# Patient Record
Sex: MALE | Race: WHITE | NOT HISPANIC OR LATINO | URBAN - METROPOLITAN AREA
[De-identification: names, ages, dates, MRNs, and addresses within clinical notes are randomized per-mention and may not be internally consistent; named-entity substitution may affect disease eponyms.]

---

## 2018-05-10 ENCOUNTER — EMERGENCY (EMERGENCY)
Facility: HOSPITAL | Age: 83
LOS: 1 days | Discharge: HOME | End: 2018-05-10
Attending: EMERGENCY MEDICINE | Admitting: EMERGENCY MEDICINE
Payer: MEDICARE

## 2018-05-10 VITALS
OXYGEN SATURATION: 99 % | SYSTOLIC BLOOD PRESSURE: 173 MMHG | DIASTOLIC BLOOD PRESSURE: 79 MMHG | HEART RATE: 65 BPM | RESPIRATION RATE: 15 BRPM

## 2018-05-10 VITALS
DIASTOLIC BLOOD PRESSURE: 90 MMHG | SYSTOLIC BLOOD PRESSURE: 173 MMHG | OXYGEN SATURATION: 100 % | HEART RATE: 68 BPM | TEMPERATURE: 98 F | RESPIRATION RATE: 15 BRPM

## 2018-05-10 LAB
ALBUMIN SERPL ELPH-MCNC: 3.6 G/DL — SIGNIFICANT CHANGE UP (ref 3.3–5)
ALP SERPL-CCNC: 73 U/L — SIGNIFICANT CHANGE UP (ref 40–120)
ALT FLD-CCNC: 21 U/L — SIGNIFICANT CHANGE UP (ref 12–78)
ANION GAP SERPL CALC-SCNC: 10 MMOL/L — SIGNIFICANT CHANGE UP (ref 5–17)
AST SERPL-CCNC: 22 U/L — SIGNIFICANT CHANGE UP (ref 15–37)
BASOPHILS # BLD AUTO: 0.1 K/UL — SIGNIFICANT CHANGE UP (ref 0–0.2)
BASOPHILS NFR BLD AUTO: 0.8 % — SIGNIFICANT CHANGE UP (ref 0–2)
BILIRUB SERPL-MCNC: 0.3 MG/DL — SIGNIFICANT CHANGE UP (ref 0.2–1.2)
BUN SERPL-MCNC: 19 MG/DL — SIGNIFICANT CHANGE UP (ref 7–23)
CALCIUM SERPL-MCNC: 8.5 MG/DL — SIGNIFICANT CHANGE UP (ref 8.5–10.1)
CHLORIDE SERPL-SCNC: 106 MMOL/L — SIGNIFICANT CHANGE UP (ref 96–108)
CK MB BLD-MCNC: 3.5 % — SIGNIFICANT CHANGE UP (ref 0–3.5)
CK MB CFR SERPL CALC: 2.1 NG/ML — SIGNIFICANT CHANGE UP (ref 0–3.6)
CK SERPL-CCNC: 60 U/L — SIGNIFICANT CHANGE UP (ref 26–308)
CO2 SERPL-SCNC: 24 MMOL/L — SIGNIFICANT CHANGE UP (ref 22–31)
CREAT SERPL-MCNC: 0.91 MG/DL — SIGNIFICANT CHANGE UP (ref 0.5–1.3)
EOSINOPHIL # BLD AUTO: 0.1 K/UL — SIGNIFICANT CHANGE UP (ref 0–0.5)
EOSINOPHIL NFR BLD AUTO: 1.4 % — SIGNIFICANT CHANGE UP (ref 0–6)
GLUCOSE SERPL-MCNC: 114 MG/DL — HIGH (ref 70–99)
HCT VFR BLD CALC: 38.8 % — LOW (ref 39–50)
HGB BLD-MCNC: 13 G/DL — SIGNIFICANT CHANGE UP (ref 13–17)
LYMPHOCYTES # BLD AUTO: 1.1 K/UL — SIGNIFICANT CHANGE UP (ref 1–3.3)
LYMPHOCYTES # BLD AUTO: 13.9 % — SIGNIFICANT CHANGE UP (ref 13–44)
MCHC RBC-ENTMCNC: 30.6 PG — SIGNIFICANT CHANGE UP (ref 27–34)
MCHC RBC-ENTMCNC: 33.6 GM/DL — SIGNIFICANT CHANGE UP (ref 32–36)
MCV RBC AUTO: 91.1 FL — SIGNIFICANT CHANGE UP (ref 80–100)
MONOCYTES # BLD AUTO: 0.6 K/UL — SIGNIFICANT CHANGE UP (ref 0–0.9)
MONOCYTES NFR BLD AUTO: 6.7 % — SIGNIFICANT CHANGE UP (ref 1–9)
NEUTROPHILS # BLD AUTO: 6.4 K/UL — SIGNIFICANT CHANGE UP (ref 1.8–7.4)
NEUTROPHILS NFR BLD AUTO: 77.2 % — HIGH (ref 43–77)
PLATELET # BLD AUTO: 127 K/UL — LOW (ref 150–400)
POTASSIUM SERPL-MCNC: 3.9 MMOL/L — SIGNIFICANT CHANGE UP (ref 3.5–5.3)
POTASSIUM SERPL-SCNC: 3.9 MMOL/L — SIGNIFICANT CHANGE UP (ref 3.5–5.3)
PROT SERPL-MCNC: 6.6 G/DL — SIGNIFICANT CHANGE UP (ref 6–8.3)
RBC # BLD: 4.26 M/UL — SIGNIFICANT CHANGE UP (ref 4.2–5.8)
RBC # FLD: 13.1 % — SIGNIFICANT CHANGE UP (ref 10.3–14.5)
SODIUM SERPL-SCNC: 140 MMOL/L — SIGNIFICANT CHANGE UP (ref 135–145)
TROPONIN I SERPL-MCNC: <.015 NG/ML — SIGNIFICANT CHANGE UP (ref 0.01–0.04)
WBC # BLD: 8.2 K/UL — SIGNIFICANT CHANGE UP (ref 3.8–10.5)
WBC # FLD AUTO: 8.2 K/UL — SIGNIFICANT CHANGE UP (ref 3.8–10.5)

## 2018-05-10 PROCEDURE — 82553 CREATINE MB FRACTION: CPT

## 2018-05-10 PROCEDURE — 93005 ELECTROCARDIOGRAM TRACING: CPT

## 2018-05-10 PROCEDURE — 99284 EMERGENCY DEPT VISIT MOD MDM: CPT | Mod: 25

## 2018-05-10 PROCEDURE — 99285 EMERGENCY DEPT VISIT HI MDM: CPT

## 2018-05-10 PROCEDURE — 84484 ASSAY OF TROPONIN QUANT: CPT

## 2018-05-10 PROCEDURE — 82550 ASSAY OF CK (CPK): CPT

## 2018-05-10 PROCEDURE — 80053 COMPREHEN METABOLIC PANEL: CPT

## 2018-05-10 PROCEDURE — 85027 COMPLETE CBC AUTOMATED: CPT

## 2018-05-10 PROCEDURE — 71045 X-RAY EXAM CHEST 1 VIEW: CPT

## 2018-05-10 PROCEDURE — 71045 X-RAY EXAM CHEST 1 VIEW: CPT | Mod: 26

## 2018-05-10 RX ORDER — ATENOLOL 25 MG/1
25 TABLET ORAL ONCE
Qty: 0 | Refills: 0 | Status: COMPLETED | OUTPATIENT
Start: 2018-05-10 | End: 2018-05-10

## 2018-05-10 RX ORDER — HYDRALAZINE HCL 50 MG
10 TABLET ORAL ONCE
Qty: 0 | Refills: 0 | Status: DISCONTINUED | OUTPATIENT
Start: 2018-05-10 | End: 2018-05-14

## 2018-05-10 RX ORDER — AMLODIPINE BESYLATE 2.5 MG/1
2.5 TABLET ORAL ONCE
Qty: 0 | Refills: 0 | Status: COMPLETED | OUTPATIENT
Start: 2018-05-10 | End: 2018-05-10

## 2018-05-10 RX ORDER — SODIUM CHLORIDE 9 MG/ML
3 INJECTION INTRAMUSCULAR; INTRAVENOUS; SUBCUTANEOUS ONCE
Qty: 0 | Refills: 0 | Status: COMPLETED | OUTPATIENT
Start: 2018-05-10 | End: 2018-05-10

## 2018-05-10 RX ADMIN — AMLODIPINE BESYLATE 2.5 MILLIGRAM(S): 2.5 TABLET ORAL at 20:48

## 2018-05-10 RX ADMIN — ATENOLOL 25 MILLIGRAM(S): 25 TABLET ORAL at 20:48

## 2018-05-10 RX ADMIN — SODIUM CHLORIDE 3 MILLILITER(S): 9 INJECTION INTRAMUSCULAR; INTRAVENOUS; SUBCUTANEOUS at 20:39

## 2018-05-10 NOTE — ED ADULT NURSE REASSESSMENT NOTE - NS ED NURSE REASSESS COMMENT FT1
son at bedside, cardiologist at bedside evaluating patient, aware of elevated b/p. Patient states that he has not been taking all of his medications for blood pressure over past few days since he has been forgetting at times. Denies chest pain, sob, headache. Awaiting results and dispo at this time.

## 2018-05-10 NOTE — ED PROVIDER NOTE - MEDICAL DECISION MAKING DETAILS
pt with presyncopal dizziness after getting up quick, not resolved, took xanax,  pt with continued dx. per friends pt wife  . pt anxious. check ekg, labs x 1. cards consult

## 2018-05-10 NOTE — ED PROVIDER NOTE - CARE PLAN
Principal Discharge DX:	Dizziness Principal Discharge DX:	Dizziness  Secondary Diagnosis:	Hypertension, unspecified type

## 2018-05-10 NOTE — ED ADULT NURSE REASSESSMENT NOTE - NS ED NURSE REASSESS COMMENT FT1
patient d/c to home asymptomatic, with son present, will call his PMD in AM. Patient eating dinner and tolerating well.

## 2018-05-10 NOTE — ED ADULT NURSE NOTE - OBJECTIVE STATEMENT
pt states he woke out of a sound sleep to answer the front door. he state he thought he heard knocking and the front door bell ringing. when pt got to door he became lightheaded and sat down, then he crawled to phone and called EMS. pt still with lightheadedness, denies nausea or vomiting.

## 2018-05-10 NOTE — CONSULT NOTE ADULT - SUBJECTIVE AND OBJECTIVE BOX
History of Present Illness: The patient is a 89 year old male with a history of HTN, HL, CAD s/p PCI, BPH who presents with pre-syncope. He states he was feeling tired earlier, heard a knock at the door, stood up and walked to door. When he got there, he felt very lightheaded as if he was going to lose consciousness. EMS came and his BPs were noted to be elevated. He denies palpitations, chest pain, shortness of breath. He did feel nauseous en route to ED and vomited once. He had similar symptoms in the past when his BP was elevated.    Past Medical/Surgical History:  HTN, HL, CAD s/p PCI, BPH    Medications:  Home Medications:  ALPRAZolam:  (10 May 2018 18:12)  aspirin:  (10 May 2018 18:12)  atenolol 25 mg daily:  (10 May 2018 18:12)  Zocor:  (10 May 2018 18:12)  amlodipine 2.5 mg daily    Family History: Non-contributory family history of premature cardiovascular atherosclerotic disease    Social History: No tobacco, alcohol or drug use    Review of Systems:  General: No fevers, chills, weight loss or gain  Skin: No rashes, color changes  Cardiovascular: No chest pain, orthopnea  Respiratory: No shortness of breath, cough  Gastrointestinal: No nausea, abdominal pain  Genitourinary: No incontinence, pain with urination  Musculoskeletal: No pain, swelling, decreased range of motion  Neurological: No headache, weakness  Psychiatric: No depression, anxiety  Endocrine: No weight loss or gain, increased thirst  All other systems are comprehensively negative.    Physical Exam:  Vitals:        Vital Signs Last 24 Hrs  T(C): 36.5 (10 May 2018 18:07), Max: 36.5 (10 May 2018 18:07)  T(F): 97.7 (10 May 2018 18:07), Max: 97.7 (10 May 2018 18:07)  HR: 65 (10 May 2018 19:45) (60 - 68)  BP: 213/93 (10 May 2018 19:45) (172/78 - 213/93)  BP(mean): --  RR: 16 (10 May 2018 19:45) (15 - 16)  SpO2: 100% (10 May 2018 19:45) (100% - 100%)  General: NAD  HEENT: MMM  Neck: No JVD, no carotid bruit  Lungs: CTAB  CV: RRR, nl S1/S2, no M/R/G  Abdomen: S/NT/ND, +BS  Extremities: No LE edema, no cyanosis  Neuro: AAOx3, non-focal  Skin: No rash    Labs:                        13.0   8.2   )-----------( 127      ( 10 May 2018 19:44 )             38.8                   ECG: NSR, normal axis, no ST abnormality

## 2018-05-10 NOTE — ED PROVIDER NOTE - CHPI ED SYMPTOMS NEG
no fever/no back pain/no nausea/no syncope/no shortness of breath/no chest pain/no cough/no chills/no diaphoresis/no vomiting

## 2018-05-10 NOTE — ED PROVIDER NOTE - OBJECTIVE STATEMENT
Pt is a 89 yo male hx htn. pt today napping and heard door and got up quick to answer door and since with presyncopal dizziness. pt on proscar and occasionally gets dizzy on getting up but today sx continued. pt took .25mg of xanax but still with dizziness. no vertigo, cp, sob, black stool, bleeding, n/v/d. pt feels very anxious. per friends, pt wife  on .

## 2018-05-10 NOTE — CONSULT NOTE ADULT - ASSESSMENT
The patient is a 89 year old male with a history of HTN, HL, CAD s/p PCI, BPH who presents with pre-syncope in the setting of hypertensive urgency.    Plan:  - ECG with no predisposing features for pre-syncope / syncope  - CXR with no active disease  - Basic lab work including one set of cardiac enzymes negative  - Give hydralazine 10 mg IV now  - Restart amlodipine and atenolol  - If above work-up negative, BP improved, and patient's symptoms improved, patient can be discharged from a cardiac perspective and follow-up with his cardiologist next week for BP check, and further work-up if indicated

## 2019-03-05 ENCOUNTER — EMERGENCY (EMERGENCY)
Facility: HOSPITAL | Age: 84
LOS: 1 days | Discharge: HOME | End: 2019-03-05
Attending: EMERGENCY MEDICINE | Admitting: EMERGENCY MEDICINE
Payer: MEDICARE

## 2019-03-05 VITALS — HEIGHT: 67 IN | WEIGHT: 149.91 LBS

## 2019-03-05 VITALS
DIASTOLIC BLOOD PRESSURE: 80 MMHG | TEMPERATURE: 97 F | RESPIRATION RATE: 18 BRPM | SYSTOLIC BLOOD PRESSURE: 162 MMHG | HEART RATE: 74 BPM | OXYGEN SATURATION: 95 %

## 2019-03-05 LAB
ALBUMIN SERPL ELPH-MCNC: 3.6 G/DL — SIGNIFICANT CHANGE UP (ref 3.3–5)
ALP SERPL-CCNC: 90 U/L — SIGNIFICANT CHANGE UP (ref 40–120)
ALT FLD-CCNC: 31 U/L — SIGNIFICANT CHANGE UP (ref 12–78)
ANION GAP SERPL CALC-SCNC: 9 MMOL/L — SIGNIFICANT CHANGE UP (ref 5–17)
APTT BLD: 33 SEC — SIGNIFICANT CHANGE UP (ref 28.5–37)
AST SERPL-CCNC: 22 U/L — SIGNIFICANT CHANGE UP (ref 15–37)
BILIRUB SERPL-MCNC: 0.5 MG/DL — SIGNIFICANT CHANGE UP (ref 0.2–1.2)
BUN SERPL-MCNC: 14 MG/DL — SIGNIFICANT CHANGE UP (ref 7–23)
CALCIUM SERPL-MCNC: 8.3 MG/DL — LOW (ref 8.5–10.1)
CHLORIDE SERPL-SCNC: 96 MMOL/L — SIGNIFICANT CHANGE UP (ref 96–108)
CO2 SERPL-SCNC: 27 MMOL/L — SIGNIFICANT CHANGE UP (ref 22–31)
CREAT SERPL-MCNC: 1 MG/DL — SIGNIFICANT CHANGE UP (ref 0.5–1.3)
GLUCOSE SERPL-MCNC: 109 MG/DL — HIGH (ref 70–99)
HCT VFR BLD CALC: 38.5 % — LOW (ref 39–50)
HGB BLD-MCNC: 12.6 G/DL — LOW (ref 13–17)
INR BLD: 1.32 RATIO — HIGH (ref 0.88–1.16)
MCHC RBC-ENTMCNC: 29.4 PG — SIGNIFICANT CHANGE UP (ref 27–34)
MCHC RBC-ENTMCNC: 32.7 GM/DL — SIGNIFICANT CHANGE UP (ref 32–36)
MCV RBC AUTO: 90 FL — SIGNIFICANT CHANGE UP (ref 80–100)
NRBC # BLD: 0 /100 WBCS — SIGNIFICANT CHANGE UP (ref 0–0)
PLATELET # BLD AUTO: 160 K/UL — SIGNIFICANT CHANGE UP (ref 150–400)
POTASSIUM SERPL-MCNC: 3.7 MMOL/L — SIGNIFICANT CHANGE UP (ref 3.5–5.3)
POTASSIUM SERPL-SCNC: 3.7 MMOL/L — SIGNIFICANT CHANGE UP (ref 3.5–5.3)
PROT SERPL-MCNC: 7.3 G/DL — SIGNIFICANT CHANGE UP (ref 6–8.3)
PROTHROM AB SERPL-ACNC: 15.2 SEC — HIGH (ref 10–12.9)
RBC # BLD: 4.28 M/UL — SIGNIFICANT CHANGE UP (ref 4.2–5.8)
RBC # FLD: 13.2 % — SIGNIFICANT CHANGE UP (ref 10.3–14.5)
SODIUM SERPL-SCNC: 132 MMOL/L — LOW (ref 135–145)
WBC # BLD: 8.29 K/UL — SIGNIFICANT CHANGE UP (ref 3.8–10.5)
WBC # FLD AUTO: 8.29 K/UL — SIGNIFICANT CHANGE UP (ref 3.8–10.5)

## 2019-03-05 PROCEDURE — 85730 THROMBOPLASTIN TIME PARTIAL: CPT

## 2019-03-05 PROCEDURE — 93005 ELECTROCARDIOGRAM TRACING: CPT

## 2019-03-05 PROCEDURE — 99284 EMERGENCY DEPT VISIT MOD MDM: CPT | Mod: 25

## 2019-03-05 PROCEDURE — 80053 COMPREHEN METABOLIC PANEL: CPT

## 2019-03-05 PROCEDURE — 96374 THER/PROPH/DIAG INJ IV PUSH: CPT

## 2019-03-05 PROCEDURE — 85027 COMPLETE CBC AUTOMATED: CPT

## 2019-03-05 PROCEDURE — 93010 ELECTROCARDIOGRAM REPORT: CPT

## 2019-03-05 PROCEDURE — 99284 EMERGENCY DEPT VISIT MOD MDM: CPT

## 2019-03-05 PROCEDURE — 85610 PROTHROMBIN TIME: CPT

## 2019-03-05 PROCEDURE — 36415 COLL VENOUS BLD VENIPUNCTURE: CPT

## 2019-03-05 RX ORDER — SODIUM CHLORIDE 9 MG/ML
1000 INJECTION INTRAMUSCULAR; INTRAVENOUS; SUBCUTANEOUS ONCE
Qty: 0 | Refills: 0 | Status: COMPLETED | OUTPATIENT
Start: 2019-03-05 | End: 2019-03-05

## 2019-03-05 RX ORDER — KETOROLAC TROMETHAMINE 30 MG/ML
30 SYRINGE (ML) INJECTION ONCE
Qty: 0 | Refills: 0 | Status: DISCONTINUED | OUTPATIENT
Start: 2019-03-05 | End: 2019-03-05

## 2019-03-05 RX ADMIN — SODIUM CHLORIDE 1000 MILLILITER(S): 9 INJECTION INTRAMUSCULAR; INTRAVENOUS; SUBCUTANEOUS at 20:42

## 2019-03-05 RX ADMIN — Medication 30 MILLIGRAM(S): at 20:41

## 2019-03-05 NOTE — ED ADULT NURSE REASSESSMENT NOTE - NS ED NURSE REASSESS COMMENT FT1
Report taken from Brenda GRANADO. Pt received alert and oriented x 4 c/o 3/10 dull headache and weakness. 18 G IV noted to right forearm, and 20g iv noted to right ac. No acute distress. Lab results and ED orders pending.

## 2019-03-05 NOTE — ED PROVIDER NOTE - OBJECTIVE STATEMENT
89 yo white male with H/O HTN and HLD with recent travel to [place] who over the past week or so has been fatigued and nasally congested. Over the past day has been weaker and then today had diarrhea, frequent and non bloody without chest pain, abdominal pains, dysuria, frequency, hematuria or cough/SOB. Mild headache today w/o visual changes or neck pain. No AbXs recently and no ill contacts.

## 2019-03-05 NOTE — ED PROVIDER NOTE - CARE PROVIDER_API CALL
Hima Morrow (DO)  Gastroenterology; Internal Medicine  [address] [place] [id number]  Phone: [phone]  Fax: [phone]  Follow Up Time:

## 2019-03-05 NOTE — ED PROVIDER NOTE - NSFOLLOWUPINSTRUCTIONS_ED_ALL_ED_FT
[place] diet for the next few days  Increase fluid intake  Follow-up with your doctor this week  Return here if needed

## 2019-03-06 PROBLEM — I10 ESSENTIAL (PRIMARY) HYPERTENSION: Chronic | Status: ACTIVE | Noted: 2018-05-10

## 2019-07-11 PROBLEM — E78.00 PURE HYPERCHOLESTEROLEMIA, UNSPECIFIED: Chronic | Status: ACTIVE | Noted: 2019-03-05

## 2019-07-22 ENCOUNTER — APPOINTMENT (OUTPATIENT)
Dept: OTOLARYNGOLOGY | Facility: CLINIC | Age: 84
End: 2019-07-22
Payer: MEDICARE

## 2019-07-22 VITALS
HEART RATE: 67 BPM | BODY MASS INDEX: 23.54 KG/M2 | OXYGEN SATURATION: 97 % | HEIGHT: 67 IN | WEIGHT: 150 LBS | DIASTOLIC BLOOD PRESSURE: 80 MMHG | SYSTOLIC BLOOD PRESSURE: 170 MMHG

## 2019-07-22 DIAGNOSIS — Z82.2 FAMILY HISTORY OF DEAFNESS AND HEARING LOSS: ICD-10-CM

## 2019-07-22 DIAGNOSIS — Z84.89 FAMILY HISTORY OF OTHER SPECIFIED CONDITIONS: ICD-10-CM

## 2019-07-22 DIAGNOSIS — Z86.79 PERSONAL HISTORY OF OTHER DISEASES OF THE CIRCULATORY SYSTEM: ICD-10-CM

## 2019-07-22 PROCEDURE — 99203 OFFICE O/P NEW LOW 30 MIN: CPT | Mod: 25

## 2019-07-22 PROCEDURE — 31575 DIAGNOSTIC LARYNGOSCOPY: CPT

## 2019-07-22 RX ORDER — PANTOPRAZOLE 40 MG/1
40 TABLET, DELAYED RELEASE ORAL
Refills: 0 | Status: ACTIVE | COMMUNITY

## 2019-07-22 RX ORDER — MIRTAZAPINE 15 MG/1
15 TABLET, FILM COATED ORAL
Refills: 0 | Status: ACTIVE | COMMUNITY

## 2019-07-22 RX ORDER — ALLOPURINOL 300 MG/1
300 TABLET ORAL
Refills: 0 | Status: ACTIVE | COMMUNITY

## 2019-07-22 RX ORDER — SIMVASTATIN 5 MG/1
5 TABLET, FILM COATED ORAL
Refills: 0 | Status: ACTIVE | COMMUNITY

## 2019-07-22 RX ORDER — UBIDECARENONE 200 MG
200 CAPSULE ORAL
Refills: 0 | Status: ACTIVE | COMMUNITY

## 2019-07-22 RX ORDER — ALPRAZOLAM 0.25 MG/1
0.25 TABLET ORAL
Refills: 0 | Status: ACTIVE | COMMUNITY

## 2019-07-22 RX ORDER — ATENOLOL 25 MG/1
25 TABLET ORAL
Refills: 0 | Status: ACTIVE | COMMUNITY

## 2019-07-22 NOTE — PROCEDURE
[Unable to Cooperate with Mirror] : patient unable to cooperate with mirror [Hoarseness] : hoarseness not clearly evaluated by indirect laryngoscopy [Topical Lidocaine] : topical lidocaine [Oxymetazoline HCl] : oxymetazoline HCl [Flexible Endoscope] : examined with the flexible endoscope [Serial Number: ___] : Serial Number: [unfilled] [Present] : absent [Lesion(s)] : lesion(s) [True Vocal Cords Erythematous] : no true vocal cord edema [True Vocal Cords Piedra's Nodules] : no true vocal cord nodules [True Vocal Cords Paralysis] : no true vocal cord paralysis [Arytenoid Cartilages Right] : arytenoid granuloma(s) on the right [Glottis Arytenoid Cartilages] : no arytenoid ulcerations [Glottis Arytenoid Cartilages Erythema] : no arytenoid erythema [Normal] : posterior cricoid area had healthy pink mucosa in the interarytenoid area and the esophageal inlet [de-identified] : R posterior VC nodule/granuloma

## 2019-07-22 NOTE — ASSESSMENT
[FreeTextEntry1] : Pt's RVC lesion is most c/w a benign granuloma.  Pt to f/u in 6 months for a recheck.  He will come in sooner if his voice is worsening.  In the absence of an enlarging lesion observation is warranted.

## 2019-07-22 NOTE — PHYSICAL EXAM
[Midline] : trachea located in midline position [Normal] : no rashes [de-identified] : Wears hearing aids bilaterally

## 2019-07-22 NOTE — REVIEW OF SYSTEMS
[Sneezing] : sneezing [Seasonal Allergies] : seasonal allergies [Post Nasal Drip] : post nasal drip [Hearing Loss] : hearing loss [Lightheadedness] : lightheadedness [Hoarseness] : hoarseness [Throat Clearing] : throat clearing [Throat Dryness] : throat dryness [Throat Itching] : throat itching [Eyes Itch] : itching of the eyes [Cough] : cough [Heartburn] : heartburn [Negative] : Heme/Lymph [Ear Pain] : no ear pain [Ear Itch] : no ear itch [Vertigo] : no vertigo [Recurrent Ear Infections] : no recurrent ear infections [Ear Noises] : no ear noises [Ear Drainage] : no ear drainage [Nasal Congestion] : no nasal congestion [Recurrent Sinus Infections] : no recurrent sinus infections [Nose Bleeds] : no nose bleeds [Problem Snoring] : no snoring problems [Sinus Pain] : no sinus pain [Sinus Pressure] : no sinus pressure [Sense Of Smell Problem] : no sense of smell problem [Discolored Nasal Discharge] : no discolored nasal discharge [Snoring With Pauses] : no snoring with pauses [Throat Pain] : no throat pain [Swelling Neck] : no neck swelling [Swelling Face] : no face swelling [Fever] : no fever [Chills] : no chills [Feeling Poorly] : not feeling poorly [Feeling Tired] : not feeling tired [Recent Weight Gain (___ Lbs)] : no recent weight gain [Recent Weight Loss (___ Lbs)] : no recent weight loss [Eye Pain] : no eye pain [Red  Eyes] : eyes not red [Eyesight Problems] : no eyesight problems [Discharge From Eyes] : no purulent discharge from the eyes [Heart Rate Is Slow] : the heart rate was not slow [Dry Eyes] : no dryness of the eyes [Heart Rate Is Fast] : the heart rate was not fast [Leg Claudication] : no intermittent leg claudication [Palpitations] : no palpitations [Chest Pain] : no chest pain [Lower Ext Edema] : no extremity edema [Shortness Of Breath] : no shortness of breath [Wheezing] : no wheezing [Orthopnea] : no orthopnea [SOB on Exertion] : no shortness of breath during exertion [Abdominal Pain] : no abdominal pain [PND] : no PND [Constipation] : no constipation [Vomiting] : no vomiting [Diarrhea] : no diarrhea [Melena] : no melena [Dysuria] : no dysuria [Incontinence] : no incontinence [Hesitancy] : no urinary hesitancy [Nocturia] : no nocturia [Genital Lesion] : no genital lesions [Testicular Pain] : no testicular pain [Arthralgias] : no arthralgias [Joint Swelling] : no joint swelling [Joint Pain] : no joint pain [Limb Pain] : no limb pain [Joint Stiffness] : no joint stiffness [Limb Swelling] : no limb swelling [Skin Lesions] : no skin lesions [Skin Wound] : no skin wound [Change In A Mole] : no change in a mole [Itching] : no itching [Dry Skin] : no dry skin [An Unusual Growth] : no unusual growth on the skin [Confused] : no confusion [Convulsions] : no convulsions [Dizziness] : no dizziness [Fainting] : no fainting [Limb Weakness] : no limb weakness [Difficulty Walking] : no difficulty walking [Suicidal] : not suicidal [Sleep Disturbances] : no sleep disturbances [Anxiety] : no anxiety [Depression] : no depression [Change In Personality] : no personality change [Proptosis] : no proptosis [Emotional Problems] : no emotional problems [Hot Flashes] : no hot flashes [Muscle Weakness] : no muscle weakness [Deepening Of The Voice] : no deepening of the voice [Erectile Dysfunction] : no erectile dysfunction [Easy Bleeding] : no tendency for easy bleeding [Feelings Of Weakness] : no feelings of weakness [Easy Bruising] : no tendency for easy bruising [Swollen Glands In The Neck] : no swollen glands in the neck [Swollen Glands] : no swollen glands [de-identified] : E [FreeTextEntry7] : Reflux [FreeTextEntry3] : Watery eyes

## 2019-07-22 NOTE — HISTORY OF PRESENT ILLNESS
[de-identified] : 89 y/o M with a h/o a raspy voice for the past year intermittently.  Associated symptoms include a cough and a sensation of phlegm in his throat.  He had an EGD a year ago and was diagnosed with acid reflux.  He has been on Pantoprazole, which helped with swallowing and excessive belching, but did not help with his voice.

## 2019-07-22 NOTE — CONSULT LETTER
[Dear  ___] : Dear  [unfilled], [Please see my note below.] : Please see my note below. [Consult Letter:] : I had the pleasure of evaluating your patient, [unfilled]. [Consult Closing:] : Thank you very much for allowing me to participate in the care of this patient.  If you have any questions, please do not hesitate to contact me. [Sincerely,] : Sincerely, [FreeTextEntry2] : Joel Goldberg, MD [FreeTextEntry3] : Jordin Briceno MD, FACS\par Clinical \par Dept. of Otolaryngology\par [facility]\par

## 2020-03-10 ENCOUNTER — APPOINTMENT (OUTPATIENT)
Dept: OTOLARYNGOLOGY | Facility: CLINIC | Age: 85
End: 2020-03-10
Payer: MEDICARE

## 2020-03-10 VITALS
WEIGHT: 152 LBS | DIASTOLIC BLOOD PRESSURE: 73 MMHG | HEIGHT: 67 IN | SYSTOLIC BLOOD PRESSURE: 145 MMHG | RESPIRATION RATE: 18 BRPM | HEART RATE: 61 BPM | BODY MASS INDEX: 23.86 KG/M2

## 2020-03-10 DIAGNOSIS — J38.3 OTHER DISEASES OF VOCAL CORDS: ICD-10-CM

## 2020-03-10 DIAGNOSIS — R49.0 DYSPHONIA: ICD-10-CM

## 2020-03-10 DIAGNOSIS — J38.7 OTHER DISEASES OF LARYNX: ICD-10-CM

## 2020-03-10 PROCEDURE — 31575 DIAGNOSTIC LARYNGOSCOPY: CPT

## 2020-03-10 PROCEDURE — 99213 OFFICE O/P EST LOW 20 MIN: CPT | Mod: 25

## 2020-03-10 RX ORDER — LEVOTHYROXINE SODIUM 112 UG/1
112 TABLET ORAL
Refills: 0 | Status: ACTIVE | COMMUNITY

## 2020-03-10 RX ORDER — LEVOTHYROXINE SODIUM 0.17 MG/1
TABLET ORAL
Refills: 0 | Status: COMPLETED | COMMUNITY
End: 2020-03-10

## 2020-03-10 NOTE — REASON FOR VISIT
[Subsequent Evaluation] : a subsequent evaluation for [Other: _____] : [unfilled] [FreeTextEntry2] : Hoarseness

## 2020-03-10 NOTE — PHYSICAL EXAM
[de-identified] : Wears hearing aids bilaterally [Midline] : trachea located in midline position [Laryngoscopy Performed] : laryngoscopy was performed, see procedure section for findings [Normal] : no rashes

## 2020-03-10 NOTE — HISTORY OF PRESENT ILLNESS
[de-identified] : 89 year old male presents with continued intermittent hoarseness, denies denies dysphagia, odynophagia. Pt remains on Pantoprazole 40mg daily. 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

## 2020-03-10 NOTE — CONSULT LETTER
[Dear  ___] : Dear  [unfilled], [Courtesy Letter:] : I had the pleasure of seeing your patient, [unfilled], in my office today. [Please see my note below.] : Please see my note below. [Consult Closing:] : Thank you very much for allowing me to participate in the care of this patient.  If you have any questions, please do not hesitate to contact me. [Sincerely,] : Sincerely, [FreeTextEntry2] : Joel Goldberg, MD [FreeTextEntry3] : Jordin Briceno MD, FACS\par \par Dept. of Otolaryngology and Head & Neck Surgery\par [facility]\par

## 2020-03-10 NOTE — PROCEDURE
[Unable to Cooperate with Mirror] : patient unable to cooperate with mirror [Lesion] : lesion identified by mirror examination needing further evaluation [Topical Lidocaine] : topical lidocaine [Oxymetazoline HCl] : oxymetazoline HCl [Flexible Endoscope] : examined with the flexible endoscope [Serial Number: ___] : Serial Number: [unfilled] [Present] : absent [True Vocal Cords Paralysis] : no true vocal cord paralysis [True Vocal Cords Erythematous] : no true vocal cord edema [True Vocal Cords Piedra's Nodules] : no true vocal cord nodules [Glottis Arytenoid Cartilages] : no arytenoid granulomas [Glottis Arytenoid Cartilages Erythema] : no arytenoid erythema [Normal] : posterior cricoid area had healthy pink mucosa in the interarytenoid area and the esophageal inlet [de-identified] : Granuloma gone.  mild presbylarynges remains.

## 2021-12-03 ENCOUNTER — APPOINTMENT (OUTPATIENT)
Dept: OTOLARYNGOLOGY | Facility: CLINIC | Age: 86
End: 2021-12-03
Payer: MEDICARE

## 2021-12-03 VITALS — HEIGHT: 67 IN | WEIGHT: 152 LBS | BODY MASS INDEX: 23.86 KG/M2

## 2021-12-03 DIAGNOSIS — H90.5 UNSPECIFIED SENSORINEURAL HEARING LOSS: ICD-10-CM

## 2021-12-03 PROCEDURE — 99213 OFFICE O/P EST LOW 20 MIN: CPT

## 2021-12-03 NOTE — HISTORY OF PRESENT ILLNESS
[de-identified] : Jhonny Davidson is a 89 yo male with history of hearing loss, currently wearing hearing aids. He has been seen by DR. Briceno in 2020 for a vocal cord granuloma which resolved. He presents today because he believes the plastic tip of his hearing aid is lodged in the right ear. He denies otalgia or otorrhea. He notes that he has hearing loss. He denies vertigo or tinnitus. He denies fevers or chills.

## 2021-12-03 NOTE — PHYSICAL EXAM
[de-identified] : Minimal cerumen in bilateral EAC, removed. No evidence of foreign body. [Midline] : trachea located in midline position [Normal] : no rashes

## 2021-12-03 NOTE — ASSESSMENT
[FreeTextEntry1] : Jhonny Davidson presents for evaluation of possible right ear foreign body. He has no symptoms related to this. There is no foreign body on exam. Patient reassured that he can wear his hearing aid.\par \par - Follow up as needed.

## 2022-12-31 ENCOUNTER — EMERGENCY (EMERGENCY)
Facility: HOSPITAL | Age: 87
LOS: 1 days | Discharge: HOME | End: 2022-12-31
Attending: EMERGENCY MEDICINE | Admitting: EMERGENCY MEDICINE
Payer: MEDICARE

## 2022-12-31 VITALS
OXYGEN SATURATION: 98 % | SYSTOLIC BLOOD PRESSURE: 195 MMHG | TEMPERATURE: 98 F | RESPIRATION RATE: 18 BRPM | DIASTOLIC BLOOD PRESSURE: 80 MMHG | HEART RATE: 75 BPM

## 2022-12-31 PROCEDURE — 71045 X-RAY EXAM CHEST 1 VIEW: CPT | Mod: 26

## 2022-12-31 PROCEDURE — 99285 EMERGENCY DEPT VISIT HI MDM: CPT

## 2022-12-31 PROCEDURE — 93010 ELECTROCARDIOGRAM REPORT: CPT

## 2022-12-31 RX ORDER — SIMVASTATIN 20 MG/1
5 TABLET, FILM COATED ORAL AT BEDTIME
Refills: 0 | Status: DISCONTINUED | OUTPATIENT
Start: 2022-12-31 | End: 2023-01-04

## 2022-12-31 RX ORDER — DILTIAZEM HCL 120 MG
30 CAPSULE, EXT RELEASE 24 HR ORAL ONCE
Refills: 0 | Status: COMPLETED | OUTPATIENT
Start: 2022-12-31 | End: 2022-12-31

## 2022-12-31 NOTE — ED ADULT TRIAGE NOTE - CHIEF COMPLAINT QUOTE
per ems from home, exercised today, felt weak after getting out of the shower and couldn't hold himself up. did not fall per ems. pt taking cardizem 3 weeks for new diagnosis of afib.

## 2023-01-01 VITALS
TEMPERATURE: 99 F | SYSTOLIC BLOOD PRESSURE: 173 MMHG | OXYGEN SATURATION: 94 % | RESPIRATION RATE: 18 BRPM | DIASTOLIC BLOOD PRESSURE: 82 MMHG | HEART RATE: 63 BPM

## 2023-01-01 LAB
ALBUMIN SERPL ELPH-MCNC: 3.4 G/DL — SIGNIFICANT CHANGE UP (ref 3.3–5)
ALP SERPL-CCNC: 125 U/L — HIGH (ref 40–120)
ALT FLD-CCNC: 26 U/L — SIGNIFICANT CHANGE UP (ref 12–78)
ANION GAP SERPL CALC-SCNC: 8 MMOL/L — SIGNIFICANT CHANGE UP (ref 5–17)
APPEARANCE UR: CLEAR — SIGNIFICANT CHANGE UP
AST SERPL-CCNC: 29 U/L — SIGNIFICANT CHANGE UP (ref 15–37)
BASOPHILS # BLD AUTO: 0.04 K/UL — SIGNIFICANT CHANGE UP (ref 0–0.2)
BASOPHILS NFR BLD AUTO: 0.5 % — SIGNIFICANT CHANGE UP (ref 0–2)
BILIRUB SERPL-MCNC: 0.3 MG/DL — SIGNIFICANT CHANGE UP (ref 0.2–1.2)
BILIRUB UR-MCNC: NEGATIVE — SIGNIFICANT CHANGE UP
BUN SERPL-MCNC: 25 MG/DL — HIGH (ref 7–23)
CALCIUM SERPL-MCNC: 8.9 MG/DL — SIGNIFICANT CHANGE UP (ref 8.5–10.1)
CHLORIDE SERPL-SCNC: 105 MMOL/L — SIGNIFICANT CHANGE UP (ref 96–108)
CO2 SERPL-SCNC: 27 MMOL/L — SIGNIFICANT CHANGE UP (ref 22–31)
COLOR SPEC: YELLOW — SIGNIFICANT CHANGE UP
CREAT SERPL-MCNC: 1.1 MG/DL — SIGNIFICANT CHANGE UP (ref 0.5–1.3)
DIFF PNL FLD: ABNORMAL
EGFR: 61 ML/MIN/1.73M2 — SIGNIFICANT CHANGE UP
EOSINOPHIL # BLD AUTO: 0.12 K/UL — SIGNIFICANT CHANGE UP (ref 0–0.5)
EOSINOPHIL NFR BLD AUTO: 1.4 % — SIGNIFICANT CHANGE UP (ref 0–6)
FLUAV AG NPH QL: SIGNIFICANT CHANGE UP
FLUBV AG NPH QL: SIGNIFICANT CHANGE UP
GLUCOSE SERPL-MCNC: 122 MG/DL — HIGH (ref 70–99)
GLUCOSE UR QL: NEGATIVE — SIGNIFICANT CHANGE UP
HCT VFR BLD CALC: 38.4 % — LOW (ref 39–50)
HGB BLD-MCNC: 12.5 G/DL — LOW (ref 13–17)
IMM GRANULOCYTES NFR BLD AUTO: 0.9 % — SIGNIFICANT CHANGE UP (ref 0–0.9)
KETONES UR-MCNC: NEGATIVE — SIGNIFICANT CHANGE UP
LEUKOCYTE ESTERASE UR-ACNC: NEGATIVE — SIGNIFICANT CHANGE UP
LYMPHOCYTES # BLD AUTO: 0.99 K/UL — LOW (ref 1–3.3)
LYMPHOCYTES # BLD AUTO: 11.5 % — LOW (ref 13–44)
MAGNESIUM SERPL-MCNC: 1.8 MG/DL — SIGNIFICANT CHANGE UP (ref 1.6–2.6)
MCHC RBC-ENTMCNC: 29.3 PG — SIGNIFICANT CHANGE UP (ref 27–34)
MCHC RBC-ENTMCNC: 32.6 GM/DL — SIGNIFICANT CHANGE UP (ref 32–36)
MCV RBC AUTO: 89.9 FL — SIGNIFICANT CHANGE UP (ref 80–100)
MONOCYTES # BLD AUTO: 0.72 K/UL — SIGNIFICANT CHANGE UP (ref 0–0.9)
MONOCYTES NFR BLD AUTO: 8.3 % — SIGNIFICANT CHANGE UP (ref 2–14)
NEUTROPHILS # BLD AUTO: 6.68 K/UL — SIGNIFICANT CHANGE UP (ref 1.8–7.4)
NEUTROPHILS NFR BLD AUTO: 77.4 % — HIGH (ref 43–77)
NITRITE UR-MCNC: NEGATIVE — SIGNIFICANT CHANGE UP
NRBC # BLD: 0 /100 WBCS — SIGNIFICANT CHANGE UP (ref 0–0)
NT-PROBNP SERPL-SCNC: 595 PG/ML — HIGH (ref 0–450)
PH UR: 6 — SIGNIFICANT CHANGE UP (ref 5–8)
PLATELET # BLD AUTO: 155 K/UL — SIGNIFICANT CHANGE UP (ref 150–400)
POTASSIUM SERPL-MCNC: 4.1 MMOL/L — SIGNIFICANT CHANGE UP (ref 3.5–5.3)
POTASSIUM SERPL-SCNC: 4.1 MMOL/L — SIGNIFICANT CHANGE UP (ref 3.5–5.3)
PROT SERPL-MCNC: 7.3 G/DL — SIGNIFICANT CHANGE UP (ref 6–8.3)
PROT UR-MCNC: NEGATIVE — SIGNIFICANT CHANGE UP
RBC # BLD: 4.27 M/UL — SIGNIFICANT CHANGE UP (ref 4.2–5.8)
RBC # FLD: 13.8 % — SIGNIFICANT CHANGE UP (ref 10.3–14.5)
RSV RNA NPH QL NAA+NON-PROBE: SIGNIFICANT CHANGE UP
SARS-COV-2 RNA SPEC QL NAA+PROBE: SIGNIFICANT CHANGE UP
SODIUM SERPL-SCNC: 140 MMOL/L — SIGNIFICANT CHANGE UP (ref 135–145)
SP GR SPEC: 1 — LOW (ref 1.01–1.02)
TROPONIN I, HIGH SENSITIVITY RESULT: 11.9 NG/L — SIGNIFICANT CHANGE UP
UROBILINOGEN FLD QL: NEGATIVE — SIGNIFICANT CHANGE UP
WBC # BLD: 8.63 K/UL — SIGNIFICANT CHANGE UP (ref 3.8–10.5)
WBC # FLD AUTO: 8.63 K/UL — SIGNIFICANT CHANGE UP (ref 3.8–10.5)

## 2023-01-01 PROCEDURE — 93005 ELECTROCARDIOGRAM TRACING: CPT

## 2023-01-01 PROCEDURE — 72170 X-RAY EXAM OF PELVIS: CPT | Mod: 26

## 2023-01-01 PROCEDURE — 87637 SARSCOV2&INF A&B&RSV AMP PRB: CPT

## 2023-01-01 PROCEDURE — 84484 ASSAY OF TROPONIN QUANT: CPT

## 2023-01-01 PROCEDURE — 99285 EMERGENCY DEPT VISIT HI MDM: CPT | Mod: 25

## 2023-01-01 PROCEDURE — 72100 X-RAY EXAM L-S SPINE 2/3 VWS: CPT | Mod: 26

## 2023-01-01 PROCEDURE — 80053 COMPREHEN METABOLIC PANEL: CPT

## 2023-01-01 PROCEDURE — 72100 X-RAY EXAM L-S SPINE 2/3 VWS: CPT

## 2023-01-01 PROCEDURE — 36415 COLL VENOUS BLD VENIPUNCTURE: CPT

## 2023-01-01 PROCEDURE — 72170 X-RAY EXAM OF PELVIS: CPT

## 2023-01-01 PROCEDURE — 85025 COMPLETE CBC W/AUTO DIFF WBC: CPT

## 2023-01-01 PROCEDURE — 83735 ASSAY OF MAGNESIUM: CPT

## 2023-01-01 PROCEDURE — 71045 X-RAY EXAM CHEST 1 VIEW: CPT

## 2023-01-01 PROCEDURE — 83880 ASSAY OF NATRIURETIC PEPTIDE: CPT

## 2023-01-01 PROCEDURE — 81001 URINALYSIS AUTO W/SCOPE: CPT

## 2023-01-01 RX ORDER — SODIUM CHLORIDE 9 MG/ML
1000 INJECTION INTRAMUSCULAR; INTRAVENOUS; SUBCUTANEOUS
Refills: 0 | Status: DISCONTINUED | OUTPATIENT
Start: 2023-01-01 | End: 2023-01-04

## 2023-01-01 RX ORDER — ACETAMINOPHEN 500 MG
650 TABLET ORAL ONCE
Refills: 0 | Status: COMPLETED | OUTPATIENT
Start: 2023-01-01 | End: 2023-01-01

## 2023-01-01 RX ADMIN — SIMVASTATIN 5 MILLIGRAM(S): 20 TABLET, FILM COATED ORAL at 00:53

## 2023-01-01 RX ADMIN — Medication 650 MILLIGRAM(S): at 01:48

## 2023-01-01 RX ADMIN — SODIUM CHLORIDE 125 MILLILITER(S): 9 INJECTION INTRAMUSCULAR; INTRAVENOUS; SUBCUTANEOUS at 00:53

## 2023-01-01 RX ADMIN — Medication 30 MILLIGRAM(S): at 00:53

## 2023-01-01 RX ADMIN — Medication 650 MILLIGRAM(S): at 02:15

## 2023-01-01 NOTE — ED PROVIDER NOTE - NSFOLLOWUPINSTRUCTIONS_ED_ALL_ED_FT
1) Follow up with your doctor in 1-2 days  2) Return to the ER for worsening or concerning symptoms                  Log Out.      [employer]® CareNotes®     :  [facility]  	                     FALL PREVENTION FOR OLDER ADULTS - General Information            Fall Prevention for Older Adults    WHAT YOU NEED TO KNOW:    What is fall prevention? Fall prevention includes ways to make your home and other areas safer. It also includes ways you can move more carefully to prevent a fall.    What increases my risk for falls? As you age, your muscles weaken and your risk for falls increases. Your risk also increases if you take medicines that make you sleepy or dizzy. You may also be at risk if you have vision or joint problems, have low blood pressure, or are not active.    How can I help protect myself from falls? Falls are a common cause of injury for older adults. Do the following to help protect yourself:  •Stay active. Exercise can help strengthen your muscles and improve your balance. Your healthcare provider may recommend water aerobics, walking, or [employer]. He or she may also recommend physical therapy to improve your coordination. Never start an exercise program without asking your healthcare provider first.  Water Aerobics for Seniors       [employer] for Seniors           •Wear shoes that fit well and have soles that . Wear shoes both inside and outside. Use slippers with good . Avoid shoes with high heels.      •Use assistive devices as directed. Your healthcare provider may suggest that you use a cane or walker to help you keep your balance. You may need to have grab bars put in your bathroom near the toilet or in the shower.      •Stand or sit up slowly. This may help you keep your balance and prevent falls.      •Wear a personal alarm. This is a device that allows you to call 911 if you need help. Ask your healthcare provider for more information.      •Manage your medical conditions.  Keep all appointments with your healthcare providers. Visit your eye doctor as directed.      How can I make my home safer?   Fall Prevention for Seniors       •Add items to prevent falls in the bathroom. Put nonslip strips on your bath or shower floor to prevent you from slipping. Use a bath mat if you do not have carpet in the bathroom. This will prevent you from falling when you step out of the bath or shower. Use a shower seat so you do not need to stand while you shower. Sit on the toilet or a chair in your bathroom to dry yourself and put on clothing. This will prevent you from losing your balance from drying or dressing yourself while you are standing.      •Keep paths clear. Remove books, shoes, and other objects from walkways and stairs. Place cords for telephones and lamps out of the way so that you do not need to walk over them. Tape them down if you cannot move them. Remove small rugs. If you cannot remove a rug, secure it with double-sided tape. This will prevent you from tripping.      •Install bright lights in your home. Use night lights to help light paths to the bathroom or kitchen. Always turn on the light before you start walking.      •Keep items you use often on shelves within reach. Do not use a step stool to help you reach an item.      •Paint or place reflective tape on the edges of your stairs. This will help you see the stairs better.      Call your local emergency number (911 in the ) or have someone call if:   •You have fallen and are unconscious.      •You have fallen and cannot move part of your body.      When should I call my doctor?   •You have fallen and have pain or a headache.      •You have questions or concerns about your condition or care.      CARE AGREEMENT:    You have the right to help plan your care. Learn about your health condition and how it may be treated. Discuss treatment options with your healthcare providers to decide what care you want to receive. You always have the right to refuse treatment.        © Copyright [employer] 2023           back to top                          © Copyright [employer] 2023

## 2023-01-01 NOTE — ED PROVIDER NOTE - CLINICAL SUMMARY MEDICAL DECISION MAKING FREE TEXT BOX
89-year-old male with history of hypertension, A. fib, spinal stenosis who presented to the ED after his legs gave out while he was finishing up his shower.  Reported legs got wobbly and he slumped to the floor.  States he did not fall hard and denies any injuries.  Denies chest pain.  Patient is well-appearing and afebrile.  No signs of traumatic injury.  Motor strength in legs is 5 out of 5.  Will check labs, viral swab, chest x-ray, EKG.  We will give some gentle IV fluid.  Will reassess.

## 2023-01-01 NOTE — ED ADULT NURSE NOTE - NSIMPLEMENTINTERV_GEN_ALL_ED
Implemented All Fall Risk Interventions:  [place] to call system. Call bell, personal items and telephone within reach. Instruct patient to call for assistance. Room bathroom lighting operational. Non-slip footwear when patient is off stretcher. Physically safe environment: no spills, clutter or unnecessary equipment. Stretcher in lowest position, wheels locked, appropriate side rails in place. Provide visual cue, wrist band, yellow gown, etc. Monitor gait and stability. Monitor for mental status changes and reorient to person, place, and time. Review medications for side effects contributing to fall risk. Reinforce activity limits and safety measures with patient and family.

## 2023-01-01 NOTE — ED PROVIDER NOTE - PROGRESS NOTE DETAILS
Labs are unremarkable  Viral swab neg  XRs neg for fracture  CXR neg for pna  Pt's son is at bedside. Both pt and his son were updated on results. Pt has a walker and cane at home for ambulatory assistance.   Will reassess after pt completes IVFs and assess pt's ability to walk. If stable, pt will be discharged home. Son and pt are agreeable with plan. Pt ambulated well without assistance. Son is with pt and will take pt home

## 2023-01-01 NOTE — ED PROVIDER NOTE - MUSCULOSKELETAL, MLM
Spine appears normal, range of motion is not limited, no muscle or joint tenderness. FROM LEs B/L, atraumatic, nontender, no edema

## 2023-01-01 NOTE — ED PROVIDER NOTE - OBJECTIVE STATEMENT
89-year-old male with history of spinal stenosis, anxiety, hypertension, recently diagnosed A. fib a few months ago on diltiazem, hyperlipidemia presents complaining of generalized weakness mostly started this evening.  Patient reports he was in the shower his legs got wobbly as he was drying himself and he slumped to the floor.  Denies feeling dizzy, denies chest pain or palpitations or abdominal pain or shortness of breath.  Patient states before he went into the shower he did a lot of leg exercises.  Patient also reports that he had a lot of chores to do today as he lives alone and he had laundry and other chores to do around the house.  Patient reports he did really note that when he woke up this morning he was feeling a bit more tired during the day more than usual.  Reports he was eating and drinking well throughout the day.

## 2023-01-01 NOTE — ED PROVIDER NOTE - PATIENT PORTAL LINK FT
You can access the FollowMyHealth Patient Portal offered by [facility] by registering at the following website: [email or web address]. By joining [employer]’s FollowMyHealth portal, you will also be able to view your health information using other applications (apps) compatible with our system.

## 2023-01-01 NOTE — ED ADULT NURSE REASSESSMENT NOTE - NS ED NURSE REASSESS COMMENT FT1
pt assisted to bathroom with standby assist to bathroom, voiding. Minimal assistance provided, pt able to walk distance to bathroom and void standing up up

## 2023-01-01 NOTE — ED ADULT NURSE NOTE - CAS TRG GEN SKIN CONDITION
FEVER/COUGH Pupils equal, round and reactive to light, Extra-ocular movement intact, eyes are clear b/l Warm

## 2023-07-05 ENCOUNTER — EMERGENCY (EMERGENCY)
Facility: HOSPITAL | Age: 88
LOS: 1 days | Discharge: HOME | End: 2023-07-05
Attending: EMERGENCY MEDICINE | Admitting: EMERGENCY MEDICINE
Payer: MEDICARE

## 2023-07-05 VITALS
SYSTOLIC BLOOD PRESSURE: 151 MMHG | RESPIRATION RATE: 18 BRPM | WEIGHT: 145.06 LBS | OXYGEN SATURATION: 96 % | HEIGHT: 67 IN | TEMPERATURE: 98 F | DIASTOLIC BLOOD PRESSURE: 84 MMHG | HEART RATE: 67 BPM

## 2023-07-05 VITALS
DIASTOLIC BLOOD PRESSURE: 82 MMHG | SYSTOLIC BLOOD PRESSURE: 149 MMHG | HEART RATE: 65 BPM | OXYGEN SATURATION: 97 % | RESPIRATION RATE: 17 BRPM | TEMPERATURE: 98 F

## 2023-07-05 PROCEDURE — 99284 EMERGENCY DEPT VISIT MOD MDM: CPT

## 2023-07-05 PROCEDURE — 99283 EMERGENCY DEPT VISIT LOW MDM: CPT

## 2023-07-05 NOTE — ED ADULT NURSE NOTE - NSFALLHARMRISKINTERV_ED_ALL_ED

## 2023-07-05 NOTE — ED PROVIDER NOTE - PROGRESS NOTE DETAILS
Bedside bladder scan by VANNESA Haider - 9ml of urine in bladder. Neumann irrigated without difficulty or resistance Neumann draining in ED without any issues.

## 2023-07-05 NOTE — ED ADULT NURSE NOTE - OBJECTIVE STATEMENT
pt states stewart has not drained since 0800. reports 650mL overnight. pt is concerned there is an obstruction. bladder is non distended, non tender. pt is asymptomatic, no fevers. bladder scan shows only 9mL urine. pt reports he has not drank anything since last night. denies chest pain, sob, distress. no discharge from stewart or infection symptoms present. catheter is patent unobstructed. 10mL urine noted in leg bag. no signs of infection.

## 2023-07-05 NOTE — ED PROVIDER NOTE - NS ED ATTENDING STATEMENT MOD
This was a shared visit with the SHABANA. I reviewed and verified the documentation and independently performed the documented:

## 2023-07-05 NOTE — ED PROVIDER NOTE - OBJECTIVE STATEMENT
89 yo male with h/o h/o hld, htn, indwelling stewart presents to the ED stating no drainage from stewart bag since 8 am this morning. Patient explains he drained his overnight bag which had approx 600 cc of urine (around his normal output). However since then has not noticed an drainage into bag. Patient otherwise feeling well, no has complaints. Denies fever, chills, chest pain, sob, abd pain, N/V, flank pain, hematuria, change in urine color/odor.

## 2023-07-05 NOTE — ED PROVIDER NOTE - NSFOLLOWUPINSTRUCTIONS_ED_ALL_ED_FT
Neumann Catheter Care, Adult    A Neumann catheter is a soft, flexible tube that is placed into the bladder to drain urine. A Neumann catheter may be inserted if:    You leak urine or are not able to control when you urinate (urinary incontinence).  You are not able to urinate when you need to (urinary retention).   You had prostate surgery or surgery on the genitals.  You have certain medical conditions, such as multiple sclerosis, dementia, or a spinal cord injury.    If you are going home with a Neumann catheter in place, follow the instructions below.    TAKING CARE OF THE CATHETER   Wash your hands with soap and water.   Using mild soap and warm water on a clean washcloth:    Clean the area on your body closest to the catheter insertion site using a circular motion, moving away from the catheter. Never wipe toward the catheter because this could sweep bacteria up into the urethra and cause infection.   Remove all traces of soap. Pat the area dry with a clean towel. For males, reposition the foreskin.    Attach the catheter to your leg so there is no tension on the catheter. Use adhesive tape or a leg strap. If you are using adhesive tape, remove any sticky residue left behind by the previous tape you used.   Keep the drainage bag below the level of the bladder, but keep it off the floor.   Check throughout the day to be sure the catheter is working and urine is draining freely. Make sure the tubing does not become kinked.  Do not pull on the catheter or try to remove it. Pulling could damage internal tissues.    TAKING CARE OF THE DRAINAGE BAGS  You will be given two drainage bags to take home. One is a large overnight drainage bag, and the other is a smaller leg bag that fits underneath clothing. You may wear the overnight bag at any time, but you should never wear the smaller leg bag at night. Follow the instructions below for how to empty, change, and clean your drainage bags.    Emptying the Drainage Bag    You must empty your drainage bag when it is ?–½ full or at least 2–3 times a day.     Wash your hands with soap and water.   Keep the drainage bag below your hips, below the level of your bladder. This stops urine from going back into the tubing and into your bladder.    Hold the dirty bag over the toilet or a clean container.   Open the pour spout at the bottom of the bag and empty the urine into the toilet or container. Do not let the pour spout touch the toilet, container, or any other surface. Doing so can place bacteria on the bag, which can cause an infection.   Clean the pour spout with a gauze pad or cotton ball that has rubbing alcohol on it.   Close the pour spout.   Attach the bag to your leg with adhesive tape or a leg strap.   Wash your hands well.    Changing the Drainage Bag    Change your drainage bag once a month or sooner if it starts to smell bad or look dirty. Below are steps to follow when changing the drainage bag.     Wash your hands with soap and water.   Pinch off the rubber catheter so that urine does not spill out.   Disconnect the catheter tube from the drainage tube at the connection valve. Do not let the tubes touch any surface.    Clean the end of the catheter tube with an alcohol wipe. Use a different alcohol wipe to clean the end of the drainage tube.   Connect the catheter tube to the drainage tube of the clean drainage bag.   Attach the new bag to the leg with adhesive tape or a leg strap. Avoid attaching the new bag too tightly.    Wash your hands well.    Cleaning the Drainage Bag     Wash your hands with soap and water.   Wash the bag in warm, soapy water.   Rinse the bag thoroughly with warm water.   Fill the bag with a solution of white vinegar and water (1 cup vinegar to 1 qt warm water [.2 L vinegar to 1 L warm water]). Close the bag and soak it for 30 minutes in the solution.    Rinse the bag with warm water.    Hang the bag to dry with the pour spout open and hanging downward.   Store the clean bag (once it is dry) in a clean plastic bag.   Wash your hands well.     PREVENTING INFECTION  Wash your hands before and after handling your catheter.  Take showers daily and wash the area where the catheter enters your body. Do not take baths. Replace wet leg straps with dry ones, if this applies.  Do not use powders, sprays, or lotions on the genital area. Only use creams, lotions, or ointments as directed by your caregiver.  For females, wipe from front to back after each bowel movement.   Drink enough fluids to keep your urine clear or pale yellow unless you have a fluid restriction.   Do not let the drainage bag or tubing touch or lie on the floor.   Wear cotton underwear to absorb moisture and to keep your .    SEEK MEDICAL CARE IF:  Your urine is cloudy or smells unusually bad.  Your catheter becomes clogged.  You are not draining urine into the bag or your bladder feels full.  Your catheter starts to leak.    SEEK IMMEDIATE MEDICAL CARE IF:  You have pain, swelling, redness, or pus where the catheter enters the body.  You have pain in the abdomen, legs, lower back, or bladder.  You have a fever.  You see blood fill the catheter, or your urine is pink or red.  You have nausea, vomiting, or chills.  Your catheter gets pulled out.    MAKE SURE YOU:  Understand these instructions.  Will watch your condition.  Will get help right away if you are not doing well or get worse.    ADDITIONAL NOTES AND INSTRUCTIONS    Please follow up with your Primary MD in 24-48 hr.  Seek immediate medical care for any new/worsening signs or symptoms.

## 2023-07-05 NOTE — ED PROVIDER NOTE - CLINICAL SUMMARY MEDICAL DECISION MAKING FREE TEXT BOX
Thomas: 89 yo male with h/o h/o hld, htn, indwelling stewart presents to the ED stating no drainage from stewart bag since 8 am this morning. Patient explains he drained his overnight bag which had approx 600 cc of urine (around his normal output). However since then has not noticed an drainage into bag. Patient otherwise feeling well, no has complaints. Denies fever, chills, chest pain, sob, abd pain, N/V, flank pain, hematuria, change in urine color/odor.

## 2023-07-05 NOTE — ED ADULT NURSE REASSESSMENT NOTE - NS ED NURSE REASSESS COMMENT FT1
stewart easily irrigated. no blood or S&S of obstruction or infection. pt drinking water. will monitor output

## 2023-07-05 NOTE — ED PROVIDER NOTE - PATIENT PORTAL LINK FT
You can access the FollowMyHealth Patient Portal offered by [facility] by registering at the following website: [email or web address]. By joining [facility]’s FollowMyHealth portal, you will also be able to view your health information using other applications (apps) compatible with our system.

## 2023-07-05 NOTE — ED ADULT TRIAGE NOTE - CHIEF COMPLAINT QUOTE
Pt arrived to triage stating "I haven't had urine in my bag since 8am.".   Pt states he normally puts his stewart bag to a larger drainage bag night and states he emptied ~ 600ml this monring when he woke up.   Pt was concerned because he typically empties his leg bag ~ every 2 hours but has not had any drainage.   Pt spoke to PABLO BUNCH and was told to come to ED.  Pt denies bladder pain at this time.  Abd soft.  Pt in no acute distress at this time.   bag last drained at 8am and no drainage noted since that time. BN

## 2023-12-05 ENCOUNTER — APPOINTMENT (OUTPATIENT)
Dept: WOUND CARE | Facility: HOSPITAL | Age: 88
End: 2023-12-05
Payer: MEDICARE

## 2023-12-05 ENCOUNTER — OUTPATIENT (OUTPATIENT)
Dept: OUTPATIENT SERVICES | Facility: HOSPITAL | Age: 88
LOS: 1 days | Discharge: HOME | End: 2023-12-05
Payer: MEDICARE

## 2023-12-05 VITALS
HEIGHT: 67 IN | RESPIRATION RATE: 15 BRPM | BODY MASS INDEX: 23.86 KG/M2 | TEMPERATURE: 97.9 F | OXYGEN SATURATION: 98 % | DIASTOLIC BLOOD PRESSURE: 69 MMHG | WEIGHT: 152 LBS | HEART RATE: 60 BPM | SYSTOLIC BLOOD PRESSURE: 138 MMHG

## 2023-12-05 DIAGNOSIS — L97.301 NON-PRESSURE CHRONIC ULCER OF UNSPECIFIED ANKLE LIMITED TO BREAKDOWN OF SKIN: ICD-10-CM

## 2023-12-05 DIAGNOSIS — L97.311 NON-PRESSURE CHRONIC ULCER OF RIGHT ANKLE LIMITED TO BREAKDOWN OF SKIN: ICD-10-CM

## 2023-12-05 DIAGNOSIS — Z86.69 PERSONAL HISTORY OF OTHER DISEASES OF THE NERVOUS SYSTEM AND SENSE ORGANS: ICD-10-CM

## 2023-12-05 DIAGNOSIS — Z86.79 PERSONAL HISTORY OF OTHER DISEASES OF THE CIRCULATORY SYSTEM: ICD-10-CM

## 2023-12-05 DIAGNOSIS — Z87.39 PERSONAL HISTORY OF OTHER DISEASES OF THE MUSCULOSKELETAL SYSTEM AND CONNECTIVE TISSUE: ICD-10-CM

## 2023-12-05 DIAGNOSIS — Z86.61 PERSONAL HISTORY OF INFECTIONS OF THE CENTRAL NERVOUS SYSTEM: ICD-10-CM

## 2023-12-05 PROCEDURE — 99203 OFFICE O/P NEW LOW 30 MIN: CPT

## 2023-12-05 PROCEDURE — 87186 SC STD MICRODIL/AGAR DIL: CPT

## 2023-12-05 PROCEDURE — 87070 CULTURE OTHR SPECIMN AEROBIC: CPT

## 2023-12-05 PROCEDURE — 87077 CULTURE AEROBIC IDENTIFY: CPT

## 2023-12-05 PROCEDURE — G0463: CPT

## 2023-12-06 DIAGNOSIS — L97.321 NON-PRESSURE CHRONIC ULCER OF LEFT ANKLE LIMITED TO BREAKDOWN OF SKIN: ICD-10-CM

## 2023-12-06 DIAGNOSIS — I83.223 VARICOSE VEINS OF LEFT LOWER EXTREMITY WITH BOTH ULCER OF ANKLE AND INFLAMMATION: ICD-10-CM

## 2023-12-06 DIAGNOSIS — I10 ESSENTIAL (PRIMARY) HYPERTENSION: ICD-10-CM

## 2023-12-06 DIAGNOSIS — Z82.2 FAMILY HISTORY OF DEAFNESS AND HEARING LOSS: ICD-10-CM

## 2023-12-06 DIAGNOSIS — Z98.890 OTHER SPECIFIED POSTPROCEDURAL STATES: ICD-10-CM

## 2023-12-06 DIAGNOSIS — Z90.49 ACQUIRED ABSENCE OF OTHER SPECIFIED PARTS OF DIGESTIVE TRACT: ICD-10-CM

## 2023-12-06 DIAGNOSIS — E78.5 HYPERLIPIDEMIA, UNSPECIFIED: ICD-10-CM

## 2023-12-07 PROBLEM — Z86.79 HISTORY OF PERICARDITIS: Status: RESOLVED | Noted: 2023-12-07 | Resolved: 2023-12-07

## 2023-12-07 PROBLEM — Z86.61 HISTORY OF BACTERIAL MENINGITIS: Status: RESOLVED | Noted: 2023-12-07 | Resolved: 2023-12-07

## 2023-12-07 PROBLEM — Z86.69 HISTORY OF BELL'S PALSY: Status: RESOLVED | Noted: 2023-12-07 | Resolved: 2023-12-07

## 2023-12-07 PROBLEM — Z87.39 HISTORY OF SPINAL STENOSIS: Status: RESOLVED | Noted: 2023-12-07 | Resolved: 2023-12-07

## 2023-12-07 PROBLEM — Z86.79 HISTORY OF ATRIAL FIBRILLATION: Status: RESOLVED | Noted: 2023-12-07 | Resolved: 2023-12-07

## 2023-12-07 LAB
-  AZTREONAM: SIGNIFICANT CHANGE UP
-  AZTREONAM: SIGNIFICANT CHANGE UP
-  CEFEPIME: SIGNIFICANT CHANGE UP
-  CEFEPIME: SIGNIFICANT CHANGE UP
-  CEFTAZIDIME: SIGNIFICANT CHANGE UP
-  CEFTAZIDIME: SIGNIFICANT CHANGE UP
-  CIPROFLOXACIN: SIGNIFICANT CHANGE UP
-  CIPROFLOXACIN: SIGNIFICANT CHANGE UP
-  IMIPENEM: SIGNIFICANT CHANGE UP
-  IMIPENEM: SIGNIFICANT CHANGE UP
-  LEVOFLOXACIN: SIGNIFICANT CHANGE UP
-  LEVOFLOXACIN: SIGNIFICANT CHANGE UP
-  MEROPENEM: SIGNIFICANT CHANGE UP
-  MEROPENEM: SIGNIFICANT CHANGE UP
-  PIPERACILLIN/TAZOBACTAM: SIGNIFICANT CHANGE UP
-  PIPERACILLIN/TAZOBACTAM: SIGNIFICANT CHANGE UP
METHOD TYPE: SIGNIFICANT CHANGE UP
METHOD TYPE: SIGNIFICANT CHANGE UP

## 2023-12-07 RX ORDER — FUROSEMIDE 80 MG/1
TABLET ORAL
Refills: 0 | Status: ACTIVE | COMMUNITY

## 2023-12-07 RX ORDER — MAGNESIUM 100 MG
100 TABLET ORAL
Refills: 0 | Status: ACTIVE | COMMUNITY

## 2023-12-07 RX ORDER — FINASTERIDE 5 MG/1
5 TABLET, FILM COATED ORAL
Refills: 0 | Status: COMPLETED | COMMUNITY
End: 2023-12-07

## 2023-12-07 RX ORDER — DILTIAZEM HYDROCHLORIDE 30 MG/1
30 TABLET ORAL
Refills: 0 | Status: ACTIVE | COMMUNITY

## 2023-12-07 RX ORDER — AMLODIPINE BESYLATE 5 MG/1
5 TABLET ORAL
Refills: 0 | Status: COMPLETED | COMMUNITY
End: 2023-12-07

## 2023-12-07 RX ORDER — FINASTERIDE 5 MG/1
5 TABLET, FILM COATED ORAL
Refills: 0 | Status: ACTIVE | COMMUNITY

## 2023-12-07 RX ORDER — PENTOXIFYLLINE 400 MG/1
400 TABLET, EXTENDED RELEASE ORAL
Refills: 0 | Status: ACTIVE | COMMUNITY

## 2023-12-08 LAB
-  AMPICILLIN/SULBACTAM: SIGNIFICANT CHANGE UP
-  AMPICILLIN/SULBACTAM: SIGNIFICANT CHANGE UP
-  CEFAZOLIN: SIGNIFICANT CHANGE UP
-  CEFAZOLIN: SIGNIFICANT CHANGE UP
-  CLINDAMYCIN: SIGNIFICANT CHANGE UP
-  CLINDAMYCIN: SIGNIFICANT CHANGE UP
-  DAPTOMYCIN: SIGNIFICANT CHANGE UP
-  DAPTOMYCIN: SIGNIFICANT CHANGE UP
-  ERYTHROMYCIN: SIGNIFICANT CHANGE UP
-  ERYTHROMYCIN: SIGNIFICANT CHANGE UP
-  GENTAMICIN: SIGNIFICANT CHANGE UP
-  GENTAMICIN: SIGNIFICANT CHANGE UP
-  LINEZOLID: SIGNIFICANT CHANGE UP
-  LINEZOLID: SIGNIFICANT CHANGE UP
-  OXACILLIN: SIGNIFICANT CHANGE UP
-  OXACILLIN: SIGNIFICANT CHANGE UP
-  PENICILLIN: SIGNIFICANT CHANGE UP
-  PENICILLIN: SIGNIFICANT CHANGE UP
-  RIFAMPIN: SIGNIFICANT CHANGE UP
-  RIFAMPIN: SIGNIFICANT CHANGE UP
-  TETRACYCLINE: SIGNIFICANT CHANGE UP
-  TETRACYCLINE: SIGNIFICANT CHANGE UP
-  TRIMETHOPRIM/SULFAMETHOXAZOLE: SIGNIFICANT CHANGE UP
-  TRIMETHOPRIM/SULFAMETHOXAZOLE: SIGNIFICANT CHANGE UP
-  VANCOMYCIN: SIGNIFICANT CHANGE UP
-  VANCOMYCIN: SIGNIFICANT CHANGE UP
CULTURE RESULTS: ABNORMAL
CULTURE RESULTS: ABNORMAL
METHOD TYPE: SIGNIFICANT CHANGE UP
METHOD TYPE: SIGNIFICANT CHANGE UP
ORGANISM # SPEC MICROSCOPIC CNT: ABNORMAL
ORGANISM # SPEC MICROSCOPIC CNT: SIGNIFICANT CHANGE UP
ORGANISM # SPEC MICROSCOPIC CNT: SIGNIFICANT CHANGE UP
SPECIMEN SOURCE: SIGNIFICANT CHANGE UP
SPECIMEN SOURCE: SIGNIFICANT CHANGE UP

## 2023-12-11 ENCOUNTER — NON-APPOINTMENT (OUTPATIENT)
Age: 88
End: 2023-12-11

## 2023-12-12 ENCOUNTER — OUTPATIENT (OUTPATIENT)
Dept: OUTPATIENT SERVICES | Facility: HOSPITAL | Age: 88
LOS: 1 days | Discharge: HOME | End: 2023-12-12
Payer: MEDICARE

## 2023-12-12 ENCOUNTER — APPOINTMENT (OUTPATIENT)
Dept: WOUND CARE | Facility: HOSPITAL | Age: 88
End: 2023-12-12
Payer: MEDICARE

## 2023-12-12 VITALS
SYSTOLIC BLOOD PRESSURE: 124 MMHG | OXYGEN SATURATION: 97 % | HEIGHT: 67 IN | HEART RATE: 60 BPM | WEIGHT: 152 LBS | TEMPERATURE: 98.2 F | BODY MASS INDEX: 23.86 KG/M2 | DIASTOLIC BLOOD PRESSURE: 67 MMHG | RESPIRATION RATE: 14 BRPM

## 2023-12-12 DIAGNOSIS — L97.301 NON-PRESSURE CHRONIC ULCER OF UNSPECIFIED ANKLE LIMITED TO BREAKDOWN OF SKIN: ICD-10-CM

## 2023-12-12 PROCEDURE — 99213 OFFICE O/P EST LOW 20 MIN: CPT

## 2023-12-12 PROCEDURE — G0463: CPT

## 2023-12-12 RX ORDER — DOXYCYCLINE HYCLATE 100 MG/1
100 CAPSULE ORAL
Qty: 30 | Refills: 2 | Status: COMPLETED | COMMUNITY
Start: 2023-12-12 | End: 2024-01-26

## 2023-12-12 RX ORDER — MUPIROCIN 20 MG/G
2 OINTMENT TOPICAL TWICE DAILY
Qty: 1 | Refills: 5 | Status: ACTIVE | COMMUNITY
Start: 2023-12-12 | End: 1900-01-01

## 2023-12-14 DIAGNOSIS — I83.223 VARICOSE VEINS OF LEFT LOWER EXTREMITY WITH BOTH ULCER OF ANKLE AND INFLAMMATION: ICD-10-CM

## 2023-12-14 DIAGNOSIS — Z90.49 ACQUIRED ABSENCE OF OTHER SPECIFIED PARTS OF DIGESTIVE TRACT: ICD-10-CM

## 2023-12-14 DIAGNOSIS — I10 ESSENTIAL (PRIMARY) HYPERTENSION: ICD-10-CM

## 2023-12-14 DIAGNOSIS — E78.5 HYPERLIPIDEMIA, UNSPECIFIED: ICD-10-CM

## 2023-12-14 DIAGNOSIS — L97.321 NON-PRESSURE CHRONIC ULCER OF LEFT ANKLE LIMITED TO BREAKDOWN OF SKIN: ICD-10-CM

## 2023-12-14 DIAGNOSIS — Z82.2 FAMILY HISTORY OF DEAFNESS AND HEARING LOSS: ICD-10-CM

## 2023-12-14 DIAGNOSIS — Z98.890 OTHER SPECIFIED POSTPROCEDURAL STATES: ICD-10-CM

## 2023-12-14 DIAGNOSIS — A49.02 METHICILLIN RESISTANT STAPHYLOCOCCUS AUREUS INFECTION, UNSPECIFIED SITE: ICD-10-CM

## 2023-12-19 ENCOUNTER — OUTPATIENT (OUTPATIENT)
Dept: OUTPATIENT SERVICES | Facility: HOSPITAL | Age: 88
LOS: 1 days | Discharge: HOME | End: 2023-12-19
Payer: MEDICARE

## 2023-12-19 ENCOUNTER — APPOINTMENT (OUTPATIENT)
Dept: WOUND CARE | Facility: HOSPITAL | Age: 88
End: 2023-12-19
Payer: MEDICARE

## 2023-12-19 VITALS
HEART RATE: 58 BPM | HEIGHT: 67 IN | OXYGEN SATURATION: 95 % | RESPIRATION RATE: 18 BRPM | TEMPERATURE: 98.2 F | SYSTOLIC BLOOD PRESSURE: 149 MMHG | BODY MASS INDEX: 23.86 KG/M2 | DIASTOLIC BLOOD PRESSURE: 72 MMHG | WEIGHT: 152 LBS

## 2023-12-19 DIAGNOSIS — L97.301 NON-PRESSURE CHRONIC ULCER OF UNSPECIFIED ANKLE LIMITED TO BREAKDOWN OF SKIN: ICD-10-CM

## 2023-12-19 PROCEDURE — 99214 OFFICE O/P EST MOD 30 MIN: CPT

## 2023-12-19 PROCEDURE — G0463: CPT

## 2023-12-19 NOTE — REVIEW OF SYSTEMS
[Fever] : no fever [Chills] : no chills [Eye Pain] : no eye pain [Loss Of Hearing] : no hearing loss [Shortness Of Breath] : no shortness of breath [Abdominal Pain] : no abdominal pain [Arthralgias] : arthralgias [Joint Stiffness] : joint stiffness [Skin Wound] : skin wound [Anxiety] : no anxiety [Easy Bleeding] : no tendency for easy bleeding [Negative] : Endocrine [FreeTextEntry5] : HTN, HLD [de-identified] : small superficial venous ulcer lateral left ankle + MRSA

## 2023-12-19 NOTE — ASSESSMENT
[Verbal] : Verbal [Written] : Written [Demo] : Demo [Patient] : Patient [Caregiver] : Caregiver [Good - alert, interested, motivated] : Good - alert, interested, motivated [Verbalizes knowledge/Understanding] : Verbalizes knowledge/understanding [Dressing changes] : dressing changes [Foot Care] : foot care [Skin Care] : skin care [Signs and symptoms of infection] : sign and symptoms of infection [How and When to Call] : how and when to call [Pain Management] : pain management [Patient responsibility to plan of care] : patient responsibility to plan of care [] : Yes [Stable] : stable [Home] : Home [Walker] : Walker [Not Applicable - Long Term Care/Home Health Agency] : Long Term Care/Home Health Agency: Not Applicable [FreeTextEntry2] : Infection prevention Localized wound care  Goal remaining pain free regarding wounds [FreeTextEntry4] : patient does his own dressing changes at home.  Pt to continue Doxy until completed.  Follow up in 2 to 3 weeks

## 2023-12-19 NOTE — PHYSICAL EXAM
[2 x 2] : 2 x 2  [1+] : left 1+ [Ankle Swelling (On Exam)] : present [Ankle Swelling Bilaterally] : bilaterally  [Ankle Swelling On The Left] : moderate [Varicose Veins Of Lower Extremities] : bilaterally [Ankle Swelling On The Right] : mild [] : bilaterally [Purpura] : no purpura  [Petechiae] : no petechiae [Skin Ulcer] : ulcer [Alert] : alert [Oriented to Person] : oriented to person [Oriented to Place] : oriented to place [Calm] : calm [de-identified] : calm [de-identified] : HTN, HLD [de-identified] : Venous ulcer lateral left ankle + MRSA [FreeTextEntry1] : Left lateral ankle  [FreeTextEntry2] : 0.1 [FreeTextEntry3] : 0.1 [FreeTextEntry4] : 0.2 [de-identified] : Scant Serous/sanguinous [de-identified] : Bactroban  [de-identified] : Mechanically cleansed with sterile gauze and normal saline. Patient own Band - Aid  [de-identified] : Normal [de-identified] : None [de-identified] : None [de-identified] : 100% [de-identified] : No [de-identified] : Daily [de-identified] : Primary Dressing

## 2023-12-19 NOTE — PLAN
[FreeTextEntry1] : Continue wound care , ulcer much smaller , PTR  2 weeks  Spent 20 minutes for patient care and medical decision making.

## 2023-12-19 NOTE — HISTORY OF PRESENT ILLNESS
[FreeTextEntry1] : small chronic venous ulcer lateral left ankle , smaller , clean , slight eschar , scant minimal drainage . No pain , no soi

## 2023-12-21 DIAGNOSIS — Z88.8 ALLERGY STATUS TO OTHER DRUGS, MEDICAMENTS AND BIOLOGICAL SUBSTANCES: ICD-10-CM

## 2023-12-21 DIAGNOSIS — Z86.61 PERSONAL HISTORY OF INFECTIONS OF THE CENTRAL NERVOUS SYSTEM: ICD-10-CM

## 2023-12-21 DIAGNOSIS — Z87.39 PERSONAL HISTORY OF OTHER DISEASES OF THE MUSCULOSKELETAL SYSTEM AND CONNECTIVE TISSUE: ICD-10-CM

## 2023-12-21 DIAGNOSIS — I83.223 VARICOSE VEINS OF LEFT LOWER EXTREMITY WITH BOTH ULCER OF ANKLE AND INFLAMMATION: ICD-10-CM

## 2023-12-21 DIAGNOSIS — Z90.49 ACQUIRED ABSENCE OF OTHER SPECIFIED PARTS OF DIGESTIVE TRACT: ICD-10-CM

## 2023-12-21 DIAGNOSIS — Z86.79 PERSONAL HISTORY OF OTHER DISEASES OF THE CIRCULATORY SYSTEM: ICD-10-CM

## 2023-12-21 DIAGNOSIS — Z82.2 FAMILY HISTORY OF DEAFNESS AND HEARING LOSS: ICD-10-CM

## 2023-12-21 DIAGNOSIS — Z98.890 OTHER SPECIFIED POSTPROCEDURAL STATES: ICD-10-CM

## 2023-12-21 DIAGNOSIS — I10 ESSENTIAL (PRIMARY) HYPERTENSION: ICD-10-CM

## 2023-12-21 DIAGNOSIS — E78.5 HYPERLIPIDEMIA, UNSPECIFIED: ICD-10-CM

## 2023-12-21 DIAGNOSIS — I48.91 UNSPECIFIED ATRIAL FIBRILLATION: ICD-10-CM

## 2023-12-21 DIAGNOSIS — L97.321 NON-PRESSURE CHRONIC ULCER OF LEFT ANKLE LIMITED TO BREAKDOWN OF SKIN: ICD-10-CM

## 2023-12-21 DIAGNOSIS — Z88.0 ALLERGY STATUS TO PENICILLIN: ICD-10-CM

## 2023-12-21 DIAGNOSIS — Z86.69 PERSONAL HISTORY OF OTHER DISEASES OF THE NERVOUS SYSTEM AND SENSE ORGANS: ICD-10-CM

## 2024-01-02 ENCOUNTER — APPOINTMENT (OUTPATIENT)
Dept: WOUND CARE | Facility: HOSPITAL | Age: 89
End: 2024-01-02
Payer: MEDICARE

## 2024-01-02 ENCOUNTER — OUTPATIENT (OUTPATIENT)
Dept: OUTPATIENT SERVICES | Facility: HOSPITAL | Age: 89
LOS: 1 days | Discharge: HOME | End: 2024-01-02
Payer: MEDICARE

## 2024-01-02 VITALS
HEIGHT: 67 IN | OXYGEN SATURATION: 98 % | WEIGHT: 152 LBS | HEART RATE: 59 BPM | SYSTOLIC BLOOD PRESSURE: 138 MMHG | DIASTOLIC BLOOD PRESSURE: 69 MMHG | BODY MASS INDEX: 23.86 KG/M2 | RESPIRATION RATE: 18 BRPM | TEMPERATURE: 98.2 F

## 2024-01-02 DIAGNOSIS — I83.223 VARICOSE VEINS OF LEFT LOWER EXTREMITY WITH BOTH ULCER OF ANKLE AND INFLAMMATION: ICD-10-CM

## 2024-01-02 DIAGNOSIS — Z87.39 PERSONAL HISTORY OF OTHER DISEASES OF THE MUSCULOSKELETAL SYSTEM AND CONNECTIVE TISSUE: ICD-10-CM

## 2024-01-02 DIAGNOSIS — Z90.49 ACQUIRED ABSENCE OF OTHER SPECIFIED PARTS OF DIGESTIVE TRACT: ICD-10-CM

## 2024-01-02 DIAGNOSIS — Z86.69 PERSONAL HISTORY OF OTHER DISEASES OF THE NERVOUS SYSTEM AND SENSE ORGANS: ICD-10-CM

## 2024-01-02 DIAGNOSIS — L97.329 VARICOSE VEINS OF LEFT LOWER EXTREMITY WITH BOTH ULCER OF ANKLE AND INFLAMMATION: ICD-10-CM

## 2024-01-02 DIAGNOSIS — Z88.8 ALLERGY STATUS TO OTHER DRUGS, MEDICAMENTS AND BIOLOGICAL SUBSTANCES: ICD-10-CM

## 2024-01-02 DIAGNOSIS — L97.321 NON-PRESSURE CHRONIC ULCER OF LEFT ANKLE LIMITED TO BREAKDOWN OF SKIN: ICD-10-CM

## 2024-01-02 DIAGNOSIS — Z98.890 OTHER SPECIFIED POSTPROCEDURAL STATES: ICD-10-CM

## 2024-01-02 DIAGNOSIS — Z86.61 PERSONAL HISTORY OF INFECTIONS OF THE CENTRAL NERVOUS SYSTEM: ICD-10-CM

## 2024-01-02 DIAGNOSIS — Z88.0 ALLERGY STATUS TO PENICILLIN: ICD-10-CM

## 2024-01-02 DIAGNOSIS — Z82.2 FAMILY HISTORY OF DEAFNESS AND HEARING LOSS: ICD-10-CM

## 2024-01-02 DIAGNOSIS — I48.91 UNSPECIFIED ATRIAL FIBRILLATION: ICD-10-CM

## 2024-01-02 DIAGNOSIS — R78.5 FINDING OF OTHER PSYCHOTROPIC DRUG IN BLOOD: ICD-10-CM

## 2024-01-02 DIAGNOSIS — I10 ESSENTIAL (PRIMARY) HYPERTENSION: ICD-10-CM

## 2024-01-02 DIAGNOSIS — Z86.14 PERSONAL HISTORY OF METHICILLIN RESISTANT STAPHYLOCOCCUS AUREUS INFECTION: ICD-10-CM

## 2024-01-02 DIAGNOSIS — Z86.79 PERSONAL HISTORY OF OTHER DISEASES OF THE CIRCULATORY SYSTEM: ICD-10-CM

## 2024-01-02 PROCEDURE — 99213 OFFICE O/P EST LOW 20 MIN: CPT

## 2024-01-02 PROCEDURE — G0463: CPT

## 2024-01-02 NOTE — PHYSICAL EXAM
[1+] : left 1+ [Ankle Swelling Bilaterally] : bilaterally  [Ankle Swelling (On Exam)] : present [Ankle Swelling On The Left] : moderate [Varicose Veins Of Lower Extremities] : bilaterally [Ankle Swelling On The Right] : mild [] : bilaterally [Purpura] : no purpura  [Petechiae] : no petechiae [Skin Ulcer] : no ulcer [Alert] : alert [Oriented to Person] : oriented to person [Oriented to Place] : oriented to place [Calm] : calm [de-identified] : calm [de-identified] : HTN, HLD [de-identified] : Venous ulcer lateral left ankle + MRSA , currently closed  [FreeTextEntry1] : left lateral ankle - Closed, no open wounds  [de-identified] : no treatment

## 2024-01-02 NOTE — REVIEW OF SYSTEMS
[Fever] : no fever [Chills] : no chills [Eye Pain] : no eye pain [Loss Of Hearing] : no hearing loss [Shortness Of Breath] : no shortness of breath [Abdominal Pain] : no abdominal pain [Arthralgias] : arthralgias [Joint Stiffness] : joint stiffness [Skin Wound] : skin wound [Anxiety] : no anxiety [Easy Bleeding] : no tendency for easy bleeding [Negative] : Endocrine [FreeTextEntry5] : HTN, HLD  + venous insufficiency  [de-identified] : small superficial venous ulcer lateral left ankle + MRSA , closed

## 2024-01-02 NOTE — PLAN
[FreeTextEntry1] : elevate legs , see primary to control fluid in the legs , light compression stockings , PTR 1 month Spent 20 minutes for patient care and medical decision making.

## 2024-01-02 NOTE — ASSESSMENT
[Verbal] : Verbal [Written] : Written [Patient] : Patient [Caregiver] : Caregiver [Good - alert, interested, motivated] : Good - alert, interested, motivated [Needs reinforcement] : needs reinforcement [Foot Care] : foot care [Skin Care] : skin care [Signs and symptoms of infection] : sign and symptoms of infection [Venous Disease] : venous disease [Pain Management] : pain management [] : Yes [Stable] : stable [Home] : Home [Walker] : Walker [Not Applicable - Long Term Care/Home Health Agency] : Long Term Care/Home Health Agency: Not Applicable [FreeTextEntry2] : Infection prevention Localized wound care  Goal remaining pain free regarding wounds Edema prevention   [FreeTextEntry4] : Patient instructed to elevate legs and to purchase over the counter light compression stockings (10mm/hg). [facility] and patients aid. Pt has follow up appointment with his PCP at the end of the month and will discuss diuretic regimen.  No dressings needed per DPM.  Follow up in 2 to 3 weeks

## 2024-01-02 NOTE — HISTORY OF PRESENT ILLNESS
[FreeTextEntry1] : Lateral left ankle ulcer closed , no soi , no pain + edema of both feet and legs

## 2024-01-23 ENCOUNTER — APPOINTMENT (OUTPATIENT)
Dept: WOUND CARE | Facility: HOSPITAL | Age: 89
End: 2024-01-23

## 2024-02-01 ENCOUNTER — EMERGENCY (EMERGENCY)
Facility: HOSPITAL | Age: 89
LOS: 1 days | Discharge: HOME | End: 2024-02-01
Attending: STUDENT IN AN ORGANIZED HEALTH CARE EDUCATION/TRAINING PROGRAM | Admitting: STUDENT IN AN ORGANIZED HEALTH CARE EDUCATION/TRAINING PROGRAM
Payer: MEDICARE

## 2024-02-01 VITALS
RESPIRATION RATE: 16 BRPM | OXYGEN SATURATION: 98 % | SYSTOLIC BLOOD PRESSURE: 160 MMHG | DIASTOLIC BLOOD PRESSURE: 82 MMHG | TEMPERATURE: 98 F | HEART RATE: 70 BPM

## 2024-02-01 VITALS
HEART RATE: 74 BPM | TEMPERATURE: 98 F | RESPIRATION RATE: 16 BRPM | OXYGEN SATURATION: 98 % | SYSTOLIC BLOOD PRESSURE: 170 MMHG | DIASTOLIC BLOOD PRESSURE: 90 MMHG

## 2024-02-01 LAB
APPEARANCE UR: CLEAR — SIGNIFICANT CHANGE UP
BILIRUB UR-MCNC: NEGATIVE — SIGNIFICANT CHANGE UP
COLOR SPEC: YELLOW — SIGNIFICANT CHANGE UP
DIFF PNL FLD: ABNORMAL
GLUCOSE UR QL: NEGATIVE MG/DL — SIGNIFICANT CHANGE UP
KETONES UR-MCNC: NEGATIVE MG/DL — SIGNIFICANT CHANGE UP
LEUKOCYTE ESTERASE UR-ACNC: ABNORMAL
NITRITE UR-MCNC: NEGATIVE — SIGNIFICANT CHANGE UP
PH UR: 7 — SIGNIFICANT CHANGE UP (ref 5–8)
PROT UR-MCNC: NEGATIVE MG/DL — SIGNIFICANT CHANGE UP
SP GR SPEC: 1.01 — SIGNIFICANT CHANGE UP (ref 1–1.03)
UROBILINOGEN FLD QL: 0.2 MG/DL — SIGNIFICANT CHANGE UP (ref 0.2–1)

## 2024-02-01 PROCEDURE — 87077 CULTURE AEROBIC IDENTIFY: CPT

## 2024-02-01 PROCEDURE — 81001 URINALYSIS AUTO W/SCOPE: CPT

## 2024-02-01 PROCEDURE — 51702 INSERT TEMP BLADDER CATH: CPT

## 2024-02-01 PROCEDURE — 99283 EMERGENCY DEPT VISIT LOW MDM: CPT | Mod: 25

## 2024-02-01 PROCEDURE — 99284 EMERGENCY DEPT VISIT MOD MDM: CPT

## 2024-02-01 PROCEDURE — 87186 SC STD MICRODIL/AGAR DIL: CPT

## 2024-02-01 PROCEDURE — 87086 URINE CULTURE/COLONY COUNT: CPT

## 2024-02-01 NOTE — ED ADULT NURSE NOTE - NSFALLHARMRISKINTERV_ED_ALL_ED

## 2024-02-01 NOTE — ED ADULT TRIAGE NOTE - CHIEF COMPLAINT QUOTE
Patient has Chronic Neumann.  patient noticed blood in the urine. Now decreased urine out put in the Neumann bag.

## 2024-02-01 NOTE — ED PROVIDER NOTE - CARE PROVIDER_API CALL
Carl Cameron  Urology  [address] [place] [id number]  Phone: [phone]  Fax: [phone]  Follow Up Time:

## 2024-02-01 NOTE — ED ADULT NURSE REASSESSMENT NOTE - NS ED NURSE REASSESS COMMENT FT1
pt reavaluated and leg bag with stewart in place vital signs stable d/c home to follow up and verbalizes understanding pt with son at discharge
Home

## 2024-02-01 NOTE — ED PROVIDER NOTE - NSFOLLOWUPINSTRUCTIONS_ED_ALL_ED_FT
Indwelling Urinary Catheter Care, Adult  An indwelling urinary catheter is a thin, flexible tube that is placed into the bladder to help drain urine out of the body. The catheter is inserted into the urethra. The urethra is the part of the body that drains urine from the bladder. Urine drains from the catheter into a drainage bag outside of the body.    Taking good care of your catheter will keep it working properly and help to prevent problems from developing.    What are the risks?  Bacteria may get into your bladder and cause a urinary tract infection.  Urine flow can become blocked. This can happen if the catheter is not working correctly, or if you have sediment or a blood clot in your bladder or catheter.  Tissue near the catheter may become irritated and may bleed.  How to wear your catheter and your drainage bag  Supplies needed    Adhesive tape or a leg strap.  Alcohol wipe or soap and water (if you use tape).  A clean towel (if you use tape).  Overnight drainage bag.  Smaller drainage bag (leg bag).  Wearing your catheter and bag    Use adhesive tape or a leg strap to attach your catheter to your leg.  Make sure the catheter is not pulled tight.  If a leg strap gets wet, replace it with a dry one.  If you use adhesive tape:  Use an alcohol wipe or soap and water to wash off any stickiness on your skin where you had tape before.  Use a clean towel to pat-dry the area.  Apply the new tape.  You should have received a large overnight drainage bag and a smaller leg bag that fits underneath clothing.  You may wear the overnight bag at any time, but you should not wear the leg bag at night.  Make sure the overnight drainage bag is always lower than the level of your bladder, but do not let it touch the floor. Before you go to sleep, hang the bag inside a wastebasket that is covered by a clean plastic bag.  Secure the leg bag according to 's instructions. This may be above or below the knee, depending on the length of the tubing. Make sure that:  The leg bag is below the bladder.  The tubing does not have loops or too much tension.  How to care for the skin around the catheter  Supplies needed    A clean washcloth.  Water and mild soap.  A clean towel.  Caring for your skin and catheter    Female anatomy showing the labia, urethra, and an indwelling urinary catheter in the bladder.  Male anatomy showing the penis, urethra, and an indwelling urinary catheter in the bladder.  Every day, use a clean washcloth and soapy water to clean the skin around your catheter.  Wash your hands with soap and water.  Wet a washcloth in warm water and mild soap.  Clean the skin around your urethra.  If you are female:  Use one hand to gently spread the folds of skin around your vagina (labia).  With the washcloth in your other hand, wipe the inner side of your labia on each side. Do this in a front-to-back direction.  If you are male:  Use one hand to pull back any skin that covers the end of your penis (foreskin).  With the washcloth in your other hand, wipe your penis in small circles. Start wiping at the tip of your penis, then move outward from the catheter.  Move the foreskin back in place, if needed.  With your free hand, hold the catheter close to where it enters your body. Keep holding the catheter during cleaning so it does not get pulled out.  Use your other hand to clean the catheter with the washcloth.  Only wipe downward on the catheter, toward the bag.  Do not wipe upward toward your body, because that may push bacteria into your urethra and cause infection.  Use a clean towel to pat-dry the catheter and the skin around it. Make sure to wipe off all soap.  Wash your hands with soap and water.  Shower every day. Do not take baths.  Do not use cream, ointment, or lotion on the area where the catheter enters your body, unless your health care provider tells you to do that.  Do not use powders, sprays, or lotions on your genital area.  Check your skin around the catheter every day for signs of infection. Check for:  Redness, swelling, or pain.  Fluid or blood.  Warmth.  Pus or a bad smell.  How to empty the drainage bag  Supplies needed    Rubbing alcohol.  Gauze pad or cotton ball.  Adhesive tape or a leg strap.  Emptying the bag    Empty your drainage bag (your overnight drainage bag or your leg bag) when it is ?–½ full, or at least 2–3 times a day. Clean the drainage bag according to the 's instructions or as told by your health care provider.  Wash your hands with soap and water.  Detach the drainage bag from your leg.  Hold the drainage bag over the toilet or a clean container. Make sure the drainage bag is lower than your hips and bladder. This stops urine from going back into the tubing and into your bladder.  Open the pour spout at the bottom of the bag.  Empty the urine into the toilet or container. Do not let the pour spout touch any surface. This precaution is important to prevent bacteria from getting in the bag and causing infection.  Apply rubbing alcohol to a gauze pad or cotton ball.  Use the gauze pad or cotton ball to clean the pour spout.  Close the pour spout.  Attach the bag to your leg with adhesive tape or a leg strap.  Wash your hands with soap and water.  How to change the drainage bag  Supplies needed:    Alcohol wipes.  A clean drainage bag.  Adhesive tape or a leg strap.  Changing the bag    Replace your drainage bag with a clean bag if it leaks, starts to smell bad, or looks dirty.  Wash your hands with soap and water.  Detach the dirty drainage bag from your leg.  Pinch the catheter with your fingers so that urine does not spill out.  Disconnect the catheter tube from the drainage tube at the connection valve. Do not let the tubes touch any surface.  Clean the end of the catheter tube with an alcohol wipe. Use a different alcohol wipe to clean the end of the drainage tube.  Connect the catheter tube to the drainage tube of the clean bag.  Attach the clean bag to your leg with adhesive tape or a leg strap. Avoid attaching the new bag too tightly.  Wash your hands with soap and water.  General instructions  A person washing hands with soap and water.  Never pull on your catheter or try to remove it. Pulling can damage your internal tissues.  Always wash your hands before and after you handle your catheter or drainage bag. Use a mild, fragrance-free soap. If soap and water are not available, use hand .  Always make sure there are no twists, bends, or kinks in the catheter tube.  Always make sure there are no leaks in the catheter or drainage bag.  Drink enough fluid to keep your urine pale yellow.  Do not take baths, swim, or use a hot tub.  If you are female, wipe from front to back after having a bowel movement.  Contact a health care provider if:  Your catheter gets clogged.  Your catheter starts to leak.  You have signs of infection at the catheter site, such as:  Redness, swelling, or pain where the catheter enters your body.  Fluid, blood, pus, or a bad smell coming from the area where the catheter enters your body.  The area where the catheter enters your body feels warm to the touch.  You have signs of a urinary tract infection, such as:  Fever or chills.  Urine smells unusually bad.  Cloudy urine.  Pain in your abdomen, legs, lower back, or bladder.  Nausea or vomiting.  Get help right away if:  You see blood in the catheter.  Your urine is pink or red.  Your bladder feels full.  Your urine is not draining into the bag.  Your catheter gets pulled out.  Summary  An indwelling urinary catheter is a thin, flexible tube that is placed into the bladder to help drain urine out of the body.  The catheter is inserted into the part of the body that drains urine from the bladder (urethra).  Take good care of your catheter to keep it working properly and help prevent problems from developing.  Always wash your hands before and after you handle your catheter or drainage bag.  Never pull on your catheter or try to remove it.  This information is not intended to replace advice given to you by your health care provider. Make sure you discuss any questions you have with your health care provider.

## 2024-02-01 NOTE — ED ADULT NURSE NOTE - OBJECTIVE STATEMENT
A/ox4 patient came to ED c.o urinary troubles with stewart cath. Patient had stewart cath 16fr placed last week by urologist and is now c/o abd distention and pressure/discomfort. Patient states he noticed a blood clot this AM and then no more urine flowed. Afebrile. No n./v.d.     Flushed stewart with no success, unable to flush through stewart cath. Stewart cath removed, changed with 16FR stewart with sediment noted. Patient had sediment blocking stewart cath. Patient now comfortable with no abd discomfort or pain, clear yellow urine flowing with 500cc noted.

## 2024-02-01 NOTE — ED PROVIDER NOTE - OBJECTIVE STATEMENT
89 M c/o blocked stewart catheter the past few hours today. Denies lower abd discomfort/pressure. Denies f/c, cp, sob, vomiting, back pain. urologist mami

## 2024-02-01 NOTE — ED PROVIDER NOTE - CARE PLAN
1 Principal Discharge DX:	Neumann catheter problem   Principal Discharge DX:	Neumann catheter problem  Secondary Diagnosis:	Urinary retention

## 2024-02-01 NOTE — ED PROVIDER NOTE - ATTENDING APP SHARED VISIT CONTRIBUTION OF CARE
Aftab Mills MD (Attending Physician):     I performed a history and physical exam of the patient and discussed their management with the SHABANA. I have reviewed the SHABANA note and agree with the documented findings and plan of care, except as noted. This was a shared visit with an SHABANA. I reviewed and verified the documentation and independently performed my own history/exam/medical decision making. My medical decision making and observations are found below. Please refer to any progress notes for updates on clinical course.    HPI:  89 M c/o blocked stewart catheter the past few hours today. Denies lower abd discomfort/pressure. Denies f/c, cp, sob, n/v/d, back pain. urologist mami. Patient had stewart cath 16fr placed last week by urologist and is now c/o abd distention/pressure/discomfort. Patient states he noticed a blood clot this AM and then no more urine flowed through stewart.    PE:  GEN - +In mild discomfort, A&Ox3  HEAD - NC/AT  EYES - PERRL, EOMI  ENT - Airway patent, mucous membranes moist  PULMONARY - CTA b/l, symmetric breath sounds, no W/R/R  CARDIAC - +S1S2, RRR, no M/G/R, no JVD  ABDOMEN - +BS, +moderately distended, +mildly TTP diffusely, soft, no guarding, no rebound, no masses, no rigidity   - No CVA TTP b/l. +Stewart catheter in penis, no blood at urethral meatus, no urine in Stewart bag.  EXTREMITIES - FROM, symmetric pulses, no edema  SKIN - No rash or bruising  NEUROLOGIC - Alert, speech clear, no focal deficits  PSYCH - Normal mood/affect, normal insight    MDM:  DDx includes, but not limited to: Stewart catheter malfunction, urinary retention 2/2 to blood clots, UTI, electrolyte derangement. Bladder scan, replace stewart catheter, labs, u/a, urine culture. Dispo pending w/u.

## 2024-02-01 NOTE — ED PROVIDER NOTE - PATIENT PORTAL LINK FT
You can access the FollowMyHealth Patient Portal offered by [facility] by registering at the following website: [email or web address]. By joining [facility] FollowMyHealth portal, you will also be able to view your health information using other applications (apps) compatible with our system.

## 2024-02-01 NOTE — ED PROVIDER NOTE - PROGRESS NOTE DETAILS
Aftab Mills MD (Attending Physician): Bladder scan showed >500cc urine in bladder. RN flushed stewart with no success, unable to flush through stewart cath. Stewart cath removed, changed with 16FR stewart with sediment noted. Patient had sediment blocking stewart cath. Patient now comfortable with no abd discomfort or pain, clear yellow urine flowing with 500cc noted.

## 2024-02-04 LAB
-  AMOXICILLIN/CLAVULANIC ACID: SIGNIFICANT CHANGE UP
-  AMPICILLIN/SULBACTAM: SIGNIFICANT CHANGE UP
-  AMPICILLIN: SIGNIFICANT CHANGE UP
-  AZTREONAM: SIGNIFICANT CHANGE UP
-  CEFAZOLIN: SIGNIFICANT CHANGE UP
-  CEFEPIME: SIGNIFICANT CHANGE UP
-  CEFOXITIN: SIGNIFICANT CHANGE UP
-  CEFTRIAXONE: SIGNIFICANT CHANGE UP
-  CEFUROXIME: SIGNIFICANT CHANGE UP
-  CIPROFLOXACIN: SIGNIFICANT CHANGE UP
-  ERTAPENEM: SIGNIFICANT CHANGE UP
-  GENTAMICIN: SIGNIFICANT CHANGE UP
-  LEVOFLOXACIN: SIGNIFICANT CHANGE UP
-  MEROPENEM: SIGNIFICANT CHANGE UP
-  NITROFURANTOIN: SIGNIFICANT CHANGE UP
-  PIPERACILLIN/TAZOBACTAM: SIGNIFICANT CHANGE UP
-  TOBRAMYCIN: SIGNIFICANT CHANGE UP
-  TRIMETHOPRIM/SULFAMETHOXAZOLE: SIGNIFICANT CHANGE UP
CULTURE RESULTS: ABNORMAL
METHOD TYPE: SIGNIFICANT CHANGE UP
ORGANISM # SPEC MICROSCOPIC CNT: ABNORMAL
ORGANISM # SPEC MICROSCOPIC CNT: SIGNIFICANT CHANGE UP
SPECIMEN SOURCE: SIGNIFICANT CHANGE UP

## 2024-02-10 ENCOUNTER — INPATIENT (INPATIENT)
Facility: HOSPITAL | Age: 89
LOS: 2 days | Discharge: NURSING, REHAB OR LONG-TERM CARE | DRG: 699 | End: 2024-02-13
Attending: STUDENT IN AN ORGANIZED HEALTH CARE EDUCATION/TRAINING PROGRAM | Admitting: INTERNAL MEDICINE
Payer: MEDICARE

## 2024-02-10 VITALS
DIASTOLIC BLOOD PRESSURE: 78 MMHG | HEART RATE: 64 BPM | WEIGHT: 134.92 LBS | OXYGEN SATURATION: 100 % | RESPIRATION RATE: 18 BRPM | TEMPERATURE: 97 F | HEIGHT: 67 IN | SYSTOLIC BLOOD PRESSURE: 150 MMHG

## 2024-02-10 DIAGNOSIS — R25.2 CRAMP AND SPASM: ICD-10-CM

## 2024-02-10 DIAGNOSIS — N40.0 BENIGN PROSTATIC HYPERPLASIA WITHOUT LOWER URINARY TRACT SYMPTOMS: ICD-10-CM

## 2024-02-10 DIAGNOSIS — E87.1 HYPO-OSMOLALITY AND HYPONATREMIA: ICD-10-CM

## 2024-02-10 DIAGNOSIS — Z98.890 OTHER SPECIFIED POSTPROCEDURAL STATES: Chronic | ICD-10-CM

## 2024-02-10 DIAGNOSIS — G47.00 INSOMNIA, UNSPECIFIED: ICD-10-CM

## 2024-02-10 DIAGNOSIS — E03.9 HYPOTHYROIDISM, UNSPECIFIED: ICD-10-CM

## 2024-02-10 DIAGNOSIS — N39.0 URINARY TRACT INFECTION, SITE NOT SPECIFIED: ICD-10-CM

## 2024-02-10 DIAGNOSIS — M62.82 RHABDOMYOLYSIS: ICD-10-CM

## 2024-02-10 DIAGNOSIS — M79.89 OTHER SPECIFIED SOFT TISSUE DISORDERS: ICD-10-CM

## 2024-02-10 DIAGNOSIS — R74.01 ELEVATION OF LEVELS OF LIVER TRANSAMINASE LEVELS: ICD-10-CM

## 2024-02-10 DIAGNOSIS — Z29.9 ENCOUNTER FOR PROPHYLACTIC MEASURES, UNSPECIFIED: ICD-10-CM

## 2024-02-10 DIAGNOSIS — K21.9 GASTRO-ESOPHAGEAL REFLUX DISEASE WITHOUT ESOPHAGITIS: ICD-10-CM

## 2024-02-10 DIAGNOSIS — E78.5 HYPERLIPIDEMIA, UNSPECIFIED: ICD-10-CM

## 2024-02-10 DIAGNOSIS — I10 ESSENTIAL (PRIMARY) HYPERTENSION: ICD-10-CM

## 2024-02-10 DIAGNOSIS — Z90.49 ACQUIRED ABSENCE OF OTHER SPECIFIED PARTS OF DIGESTIVE TRACT: Chronic | ICD-10-CM

## 2024-02-10 LAB
ALBUMIN SERPL ELPH-MCNC: 3.1 G/DL — LOW (ref 3.3–5)
ALP SERPL-CCNC: 134 U/L — HIGH (ref 40–120)
ALT FLD-CCNC: 43 U/L — SIGNIFICANT CHANGE UP (ref 12–78)
ANION GAP SERPL CALC-SCNC: 11 MMOL/L — SIGNIFICANT CHANGE UP (ref 5–17)
APPEARANCE UR: CLEAR — SIGNIFICANT CHANGE UP
AST SERPL-CCNC: 119 U/L — HIGH (ref 15–37)
BASOPHILS # BLD AUTO: 0.02 K/UL — SIGNIFICANT CHANGE UP (ref 0–0.2)
BASOPHILS NFR BLD AUTO: 0.1 % — SIGNIFICANT CHANGE UP (ref 0–2)
BILIRUB SERPL-MCNC: 1.1 MG/DL — SIGNIFICANT CHANGE UP (ref 0.2–1.2)
BILIRUB UR-MCNC: NEGATIVE — SIGNIFICANT CHANGE UP
BUN SERPL-MCNC: 35 MG/DL — HIGH (ref 7–23)
CALCIUM SERPL-MCNC: 9.4 MG/DL — SIGNIFICANT CHANGE UP (ref 8.5–10.1)
CHLORIDE SERPL-SCNC: 93 MMOL/L — LOW (ref 96–108)
CK SERPL-CCNC: 3579 U/L — HIGH (ref 26–308)
CO2 SERPL-SCNC: 26 MMOL/L — SIGNIFICANT CHANGE UP (ref 22–31)
COLOR SPEC: YELLOW — SIGNIFICANT CHANGE UP
CREAT SERPL-MCNC: 1.1 MG/DL — SIGNIFICANT CHANGE UP (ref 0.5–1.3)
DIFF PNL FLD: ABNORMAL
EGFR: 61 ML/MIN/1.73M2 — SIGNIFICANT CHANGE UP
EOSINOPHIL # BLD AUTO: 0 K/UL — SIGNIFICANT CHANGE UP (ref 0–0.5)
EOSINOPHIL NFR BLD AUTO: 0 % — SIGNIFICANT CHANGE UP (ref 0–6)
GLUCOSE SERPL-MCNC: 106 MG/DL — HIGH (ref 70–99)
GLUCOSE UR QL: NEGATIVE MG/DL — SIGNIFICANT CHANGE UP
HCT VFR BLD CALC: 40.6 % — SIGNIFICANT CHANGE UP (ref 39–50)
HGB BLD-MCNC: 13.8 G/DL — SIGNIFICANT CHANGE UP (ref 13–17)
IMM GRANULOCYTES NFR BLD AUTO: 0.6 % — SIGNIFICANT CHANGE UP (ref 0–0.9)
KETONES UR-MCNC: 15 MG/DL
LEUKOCYTE ESTERASE UR-ACNC: ABNORMAL
LYMPHOCYTES # BLD AUTO: 0.56 K/UL — LOW (ref 1–3.3)
LYMPHOCYTES # BLD AUTO: 3.9 % — LOW (ref 13–44)
MCHC RBC-ENTMCNC: 29.6 PG — SIGNIFICANT CHANGE UP (ref 27–34)
MCHC RBC-ENTMCNC: 34 GM/DL — SIGNIFICANT CHANGE UP (ref 32–36)
MCV RBC AUTO: 86.9 FL — SIGNIFICANT CHANGE UP (ref 80–100)
MONOCYTES # BLD AUTO: 0.94 K/UL — HIGH (ref 0–0.9)
MONOCYTES NFR BLD AUTO: 6.5 % — SIGNIFICANT CHANGE UP (ref 2–14)
NEUTROPHILS # BLD AUTO: 12.9 K/UL — HIGH (ref 1.8–7.4)
NEUTROPHILS NFR BLD AUTO: 88.9 % — HIGH (ref 43–77)
NITRITE UR-MCNC: NEGATIVE — SIGNIFICANT CHANGE UP
NRBC # BLD: 0 /100 WBCS — SIGNIFICANT CHANGE UP (ref 0–0)
PH UR: 6.5 — SIGNIFICANT CHANGE UP (ref 5–8)
PLATELET # BLD AUTO: 228 K/UL — SIGNIFICANT CHANGE UP (ref 150–400)
POTASSIUM SERPL-MCNC: 3.9 MMOL/L — SIGNIFICANT CHANGE UP (ref 3.5–5.3)
POTASSIUM SERPL-SCNC: 3.9 MMOL/L — SIGNIFICANT CHANGE UP (ref 3.5–5.3)
PROT SERPL-MCNC: 7 G/DL — SIGNIFICANT CHANGE UP (ref 6–8.3)
PROT UR-MCNC: 30 MG/DL
RBC # BLD: 4.67 M/UL — SIGNIFICANT CHANGE UP (ref 4.2–5.8)
RBC # FLD: 13.4 % — SIGNIFICANT CHANGE UP (ref 10.3–14.5)
SODIUM SERPL-SCNC: 130 MMOL/L — LOW (ref 135–145)
SP GR SPEC: 1.02 — SIGNIFICANT CHANGE UP (ref 1–1.03)
UROBILINOGEN FLD QL: 0.2 MG/DL — SIGNIFICANT CHANGE UP (ref 0.2–1)
WBC # BLD: 14.5 K/UL — HIGH (ref 3.8–10.5)
WBC # FLD AUTO: 14.5 K/UL — HIGH (ref 3.8–10.5)

## 2024-02-10 PROCEDURE — 71260 CT THORAX DX C+: CPT | Mod: 26,MA

## 2024-02-10 PROCEDURE — 74177 CT ABD & PELVIS W/CONTRAST: CPT | Mod: 26,MA

## 2024-02-10 PROCEDURE — 71045 X-RAY EXAM CHEST 1 VIEW: CPT | Mod: 26

## 2024-02-10 PROCEDURE — 70450 CT HEAD/BRAIN W/O DYE: CPT | Mod: 26,MA

## 2024-02-10 PROCEDURE — 99223 1ST HOSP IP/OBS HIGH 75: CPT | Mod: GC

## 2024-02-10 PROCEDURE — 72125 CT NECK SPINE W/O DYE: CPT | Mod: 26,MA

## 2024-02-10 PROCEDURE — 99285 EMERGENCY DEPT VISIT HI MDM: CPT

## 2024-02-10 RX ORDER — CEFTRIAXONE 500 MG/1
1000 INJECTION, POWDER, FOR SOLUTION INTRAMUSCULAR; INTRAVENOUS EVERY 24 HOURS
Refills: 0 | Status: DISCONTINUED | OUTPATIENT
Start: 2024-02-11 | End: 2024-02-12

## 2024-02-10 RX ORDER — AMLODIPINE BESYLATE 2.5 MG/1
2.5 TABLET ORAL AT BEDTIME
Refills: 0 | Status: DISCONTINUED | OUTPATIENT
Start: 2024-02-10 | End: 2024-02-13

## 2024-02-10 RX ORDER — LANOLIN ALCOHOL/MO/W.PET/CERES
3 CREAM (GRAM) TOPICAL AT BEDTIME
Refills: 0 | Status: DISCONTINUED | OUTPATIENT
Start: 2024-02-10 | End: 2024-02-13

## 2024-02-10 RX ORDER — SODIUM CHLORIDE 9 MG/ML
500 INJECTION INTRAMUSCULAR; INTRAVENOUS; SUBCUTANEOUS ONCE
Refills: 0 | Status: COMPLETED | OUTPATIENT
Start: 2024-02-10 | End: 2024-02-10

## 2024-02-10 RX ORDER — ATENOLOL 25 MG/1
25 TABLET ORAL DAILY
Refills: 0 | Status: DISCONTINUED | OUTPATIENT
Start: 2024-02-10 | End: 2024-02-13

## 2024-02-10 RX ORDER — ONDANSETRON 8 MG/1
4 TABLET, FILM COATED ORAL EVERY 8 HOURS
Refills: 0 | Status: DISCONTINUED | OUTPATIENT
Start: 2024-02-10 | End: 2024-02-13

## 2024-02-10 RX ORDER — ALPRAZOLAM 0.25 MG
0.5 TABLET ORAL
Refills: 0 | Status: DISCONTINUED | OUTPATIENT
Start: 2024-02-10 | End: 2024-02-10

## 2024-02-10 RX ORDER — ASPIRIN/CALCIUM CARB/MAGNESIUM 324 MG
0 TABLET ORAL
Qty: 0 | Refills: 0 | DISCHARGE

## 2024-02-10 RX ORDER — ACETAMINOPHEN 500 MG
650 TABLET ORAL EVERY 6 HOURS
Refills: 0 | Status: DISCONTINUED | OUTPATIENT
Start: 2024-02-10 | End: 2024-02-13

## 2024-02-10 RX ORDER — ALLOPURINOL 300 MG
300 TABLET ORAL DAILY
Refills: 0 | Status: DISCONTINUED | OUTPATIENT
Start: 2024-02-10 | End: 2024-02-13

## 2024-02-10 RX ORDER — SIMVASTATIN 20 MG/1
5 TABLET, FILM COATED ORAL AT BEDTIME
Refills: 0 | Status: DISCONTINUED | OUTPATIENT
Start: 2024-02-10 | End: 2024-02-13

## 2024-02-10 RX ORDER — SIMVASTATIN 20 MG/1
0 TABLET, FILM COATED ORAL
Qty: 0 | Refills: 0 | DISCHARGE

## 2024-02-10 RX ORDER — SODIUM CHLORIDE 9 MG/ML
1000 INJECTION INTRAMUSCULAR; INTRAVENOUS; SUBCUTANEOUS
Refills: 0 | Status: DISCONTINUED | OUTPATIENT
Start: 2024-02-10 | End: 2024-02-13

## 2024-02-10 RX ORDER — CEFTRIAXONE 500 MG/1
1000 INJECTION, POWDER, FOR SOLUTION INTRAMUSCULAR; INTRAVENOUS ONCE
Refills: 0 | Status: COMPLETED | OUTPATIENT
Start: 2024-02-10 | End: 2024-02-10

## 2024-02-10 RX ORDER — ENOXAPARIN SODIUM 100 MG/ML
40 INJECTION SUBCUTANEOUS EVERY 24 HOURS
Refills: 0 | Status: DISCONTINUED | OUTPATIENT
Start: 2024-02-10 | End: 2024-02-13

## 2024-02-10 RX ORDER — ALPRAZOLAM 0.25 MG
0.5 TABLET ORAL AT BEDTIME
Refills: 0 | Status: DISCONTINUED | OUTPATIENT
Start: 2024-02-10 | End: 2024-02-11

## 2024-02-10 RX ORDER — LEVOTHYROXINE SODIUM 125 MCG
112 TABLET ORAL DAILY
Refills: 0 | Status: DISCONTINUED | OUTPATIENT
Start: 2024-02-10 | End: 2024-02-13

## 2024-02-10 RX ORDER — ALPRAZOLAM 0.25 MG
0 TABLET ORAL
Qty: 0 | Refills: 0 | DISCHARGE

## 2024-02-10 RX ORDER — PENTOXIFYLLINE 400 MG
400 TABLET, EXTENDED RELEASE ORAL THREE TIMES A DAY
Refills: 0 | Status: DISCONTINUED | OUTPATIENT
Start: 2024-02-10 | End: 2024-02-13

## 2024-02-10 RX ORDER — ATENOLOL 25 MG/1
0 TABLET ORAL
Qty: 0 | Refills: 0 | DISCHARGE

## 2024-02-10 RX ORDER — MIRTAZAPINE 45 MG/1
15 TABLET, ORALLY DISINTEGRATING ORAL AT BEDTIME
Refills: 0 | Status: DISCONTINUED | OUTPATIENT
Start: 2024-02-10 | End: 2024-02-13

## 2024-02-10 RX ORDER — POLYETHYLENE GLYCOL 3350 17 G/17G
17 POWDER, FOR SOLUTION ORAL DAILY
Refills: 0 | Status: DISCONTINUED | OUTPATIENT
Start: 2024-02-10 | End: 2024-02-13

## 2024-02-10 RX ORDER — FINASTERIDE 5 MG/1
5 TABLET, FILM COATED ORAL DAILY
Refills: 0 | Status: DISCONTINUED | OUTPATIENT
Start: 2024-02-10 | End: 2024-02-13

## 2024-02-10 RX ORDER — PANTOPRAZOLE SODIUM 20 MG/1
40 TABLET, DELAYED RELEASE ORAL
Refills: 0 | Status: DISCONTINUED | OUTPATIENT
Start: 2024-02-10 | End: 2024-02-13

## 2024-02-10 RX ADMIN — AMLODIPINE BESYLATE 2.5 MILLIGRAM(S): 2.5 TABLET ORAL at 22:41

## 2024-02-10 RX ADMIN — SODIUM CHLORIDE 50 MILLILITER(S): 9 INJECTION INTRAMUSCULAR; INTRAVENOUS; SUBCUTANEOUS at 20:54

## 2024-02-10 RX ADMIN — CEFTRIAXONE 100 MILLIGRAM(S): 500 INJECTION, POWDER, FOR SOLUTION INTRAMUSCULAR; INTRAVENOUS at 16:22

## 2024-02-10 RX ADMIN — MIRTAZAPINE 15 MILLIGRAM(S): 45 TABLET, ORALLY DISINTEGRATING ORAL at 22:40

## 2024-02-10 RX ADMIN — SODIUM CHLORIDE 500 MILLILITER(S): 9 INJECTION INTRAMUSCULAR; INTRAVENOUS; SUBCUTANEOUS at 14:12

## 2024-02-10 RX ADMIN — Medication 400 MILLIGRAM(S): at 22:40

## 2024-02-10 RX ADMIN — Medication 0.5 MILLIGRAM(S): at 22:40

## 2024-02-10 RX ADMIN — SIMVASTATIN 5 MILLIGRAM(S): 20 TABLET, FILM COATED ORAL at 22:41

## 2024-02-10 NOTE — ED PROVIDER NOTE - ATTENDING APP SHARED VISIT CONTRIBUTION OF CARE
89 yo male with hx htn, hld, spinal stenosis, indwelling stewart was found on floor. patient was on floor x 12 hours. patient states he sat down  on floor to clean up salt and then couldn't get up  patient states " I didn't fall"  patient awake and alert  cta b/l, [id number]  abd soft  indwelling stewart    labs, including cpk, ct head/cspine

## 2024-02-10 NOTE — H&P ADULT - PROBLEM SELECTOR PLAN 2
Pt with chronic indwelling stewart, UCx collected >100,000 Proteus susceptible to rocephin   - UA with 15 ketones, 30 protein, mod LE, neg nitrites, mod blood, few bacteria   - New stewart placed in the ED; to treat colonization   - s/p rocephin 1g in the ED, will continue at this time   - repeat UCx ordered, f/u results  - Monitor daily CBC   - ID consulted (Dr. Disla), f/u recs Pt with chronic indwelling stewart, UCx collected >100,000 Proteus susceptible to rocephin   - UA with 15 ketones, 30 protein, mod LE, neg nitrites, mod blood, few bacteria   - New stewart placed in the ED; to treat colonization   - s/p rocephin 1g in the ED, will continue at this time pending ID recs, though possible it is colonozation, however patient with elevated WBC count  - repeat UCx ordered, f/u results  - Monitor daily CBC   - ID consulted (Dr. Disla), f/u recs

## 2024-02-10 NOTE — ED PROVIDER NOTE - CARE PLAN
Principal Discharge DX:	Rhabdomyolysis  Secondary Diagnosis:	Acute UTI  Secondary Diagnosis:	Multiple abrasions  Secondary Diagnosis:	Abnormal head CT   1

## 2024-02-10 NOTE — H&P ADULT - NSHPPHYSICALEXAM_GEN_ALL_CORE
T(C): 36.3 (02-10-24 @ 12:48), Max: 36.3 (02-10-24 @ 12:48)  HR: 64 (02-10-24 @ 12:48) (64 - 64)  BP: 150/78 (02-10-24 @ 12:48) (150/78 - 150/78)  RR: 18 (02-10-24 @ 12:48) (18 - 18)  SpO2: 100% (02-10-24 @ 12:48) (100% - 100%)    CONSTITUTIONAL: Well groomed, no apparent distress  EYES: PERRL and symmetric, EOMI, No conjunctival or scleral injection, non-icteric  ENMT: Oral mucosa with dry membranes. Poor dentition   NECK: Supple, symmetric and without tracheal deviation   RESP: No respiratory distress, no use of accessory muscles; CTA b/l, no WRR  CV: RRR, +S1S2, no MRG; +2 pitting edema B/L LE   GI: Soft, NT, ND, no rebound, no guarding; no palpable masses  LYMPH: No cervical LAD or tenderness  MSK: No digital clubbing or cyanosis; Normal ROM without pain, no spinal tenderness, normal muscle strength/tone  SKIN: Small abrasions noted on B/L shoulders, redness of B/L elbows with abrasions; R forehead & posterior scalp redness and abrasion  NEURO: CN II-XII intact; sensation intact in upper and lower extremities b/l to light touch   PSYCH: Appropriate insight/judgment; A+O x 3, mood and affect appropriate

## 2024-02-10 NOTE — H&P ADULT - ATTENDING COMMENTS
Patient seen and examined at bedside.     T(C): 36.3 (02-10-24 @ 12:48), Max: 36.3 (02-10-24 @ 12:48)  HR: 64 (02-10-24 @ 12:48) (64 - 64)  BP: 150/78 (02-10-24 @ 12:48) (150/78 - 150/78)  RR: 18 (02-10-24 @ 12:48) (18 - 18)  SpO2: 100% (02-10-24 @ 12:48) (100% - 100%)  Wt(kg): --    Physical Exam:   GENERAL: well-groomed, NAD  HEENT: head NC/AT; EOM intact, PERRLA, conjunctiva & sclera clear; hearing grossly diminished, dry mucous membranes  NECK: supple, no JVD  RESPIRATORY: CTA B/L, no wheezing, rales, rhonchi or rubs  CARDIOVASCULAR: S1&S2, RRR, no murmurs or gallops  ABDOMEN: soft, non-tender, non-distended, + Bowel sounds x4 quadrants, no guarding, rebound or rigidity  MUSCULOSKELETAL:  b/l LE pitting edema 2+, no cyanosis  LYMPH: no cervical lymphadenopathy  VASCULAR: Radial pulses 2+ bilaterally, no varicose veins   SKIN: warm and dry, color normal  NEUROLOGIC: AA&O X3, CN2-12 intact w/ no focal deficits, no sensory loss, motor Strength 4/5 in UE & LE B/L  Psych: Normal mood and affect, normal behavior    Plan:  Rhabdomyolysis: CK 3579, f/u repeat CK in AM  -s/p 500cc NS in ER.   -patient appears dehydrated despite 2+ LE pitting edema, will continue with gentle IVF 50 cc/hr over the next 10 hours   -hold Lasix today  -trend Cr and monitor Electrolytes  -patient was just seen by Cardiologist Dr Mcneal this week and had TTE done in office  -patient and his son prefer to not have cardiology consulted as patient just seen by cardiologist    UTI: possible UTI, or could be colnization in this patient with chronic stewart x1 year.   UCx from 8 days prior growing Proteus susceptible to rocephin  -monitor WBC count  -Dr DINESH Disla ID consulted    Hyponatemia: s/p NS  -f/u AM CBC     Transaminits: f/u AM CMP    Updated patients son at bedside.     PT consult. Outpatient f/u regarding possible bony erosion seen on CT scan. Discussed with patient and his son, would not 89 yo M with PMH HTN, HLD, spinal stenosis, BPH, Gout, hypothyroidism, insomnia, and chronic indwelling stewart catheter BIBEMS presents to ED after a fall. Admit for Rhabdomyolysis 2/2 fall and + urine culture.     Patient seen and examined at bedside.     T(C): 36.3 (02-10-24 @ 12:48), Max: 36.3 (02-10-24 @ 12:48)  HR: 64 (02-10-24 @ 12:48) (64 - 64)  BP: 150/78 (02-10-24 @ 12:48) (150/78 - 150/78)  RR: 18 (02-10-24 @ 12:48) (18 - 18)  SpO2: 100% (02-10-24 @ 12:48) (100% - 100%)  Wt(kg): --    Physical Exam:   GENERAL: well-groomed, NAD  HEENT: head NC/AT; EOM intact, PERRLA, conjunctiva & sclera clear; hearing grossly diminished, dry mucous membranes  NECK: supple, no JVD  RESPIRATORY: CTA B/L, no wheezing, rales, rhonchi or rubs  CARDIOVASCULAR: S1&S2, RRR, no murmurs or gallops  ABDOMEN: soft, non-tender, non-distended, + Bowel sounds x4 quadrants, no guarding, rebound or rigidity  MUSCULOSKELETAL:  b/l LE pitting edema 2+, no cyanosis  LYMPH: no cervical lymphadenopathy  VASCULAR: Radial pulses 2+ bilaterally, no varicose veins   SKIN: warm and dry, color normal  NEUROLOGIC: AA&O X3, CN2-12 intact w/ no focal deficits, no sensory loss, motor Strength 4/5 in UE & LE B/L  Psych: Normal mood and affect, normal behavior    Plan:  Rhabdomyolysis: CK 3579, f/u repeat CK in AM  -s/p 500cc NS in ER.   -patient appears dehydrated despite 2+ LE pitting edema, will continue with gentle IVF 50 cc/hr over the next 10 hours   -hold Lasix today  -trend Cr and monitor Electrolytes  -patient was just seen by Cardiologist Dr Mcneal this week and had TTE done in office  -patient and his son prefer to not have cardiology consulted as patient just seen by cardiologist    UTI: possible UTI, or could be colnization in this patient with chronic stewart x1 year.   UCx from 8 days prior growing Proteus susceptible to rocephin  -monitor WBC count  -Dr DINESH Disla ID consulted    Hyponatemia: s/p NS  -f/u AM CBC     Transaminits: f/u AM CMP    Updated patients son at bedside.     PT consult. Outpatient f/u regarding possible bony erosion seen on CT scan. Discussed with patient and his son, would not want any invasive procedures even if indicated.     Instructed to f/u with PCP to discuss polypharmacy and possibly reducing number of home meds given patients advanced age, for example, consideration to discontinue statin in this 89 yo gentleman as it would provide minimal benefit long term

## 2024-02-10 NOTE — H&P ADULT - ASSESSMENT
89 yo M with PMH HTN, HLD, spinal stenosis, and indwelling stewart catheter BIBEMS presents to ED after a fall. Admit for Rhabdomyolysis 2/2 fall and + urine culture.  89 yo M with PMH HTN, HLD, spinal stenosis, BPH, Gout, hypothyroidism, insomnia, and chronic indwelling stewart catheter BIBEMS presents to ED after a fall. Admit for Rhabdomyolysis 2/2 fall and + urine culture.

## 2024-02-10 NOTE — H&P ADULT - HISTORY OF PRESENT ILLNESS
89 yo M with PMH HTN, HLD, spinal stenosis, and indwelling stewart catheter BIBEMS presents to ED after a fall. Per pt, he was eating dinner around 6-7PM last night when he dropped his saltshaker. He attempted to clean it up by himself when he lost balance and fell from his seat onto the floor. During the episode, he denies having trauma to his head, loss of consciousness, headache, dizziness, or weakness. He attempted to both get up to his seat and crawl on the floor from his seat to his phone, which he was unable to do. He remained on the floor until ~11AM, when his son was unable to get in contact with him and called EMS. Pt was found to be on the floor on arrival.     Denies fever, chills, chest pain, palpitations, SOB, cough, abdominal pain, nausea, vomiting, diarrhea, constipation, urinary frequency, urgency, or dysuria, headaches, changes in vision, dizziness, numbness, tingling.    ED Course:   Vitals: BP: 150/78, HR: 64, Temp: 97.4, RR: 18, SpO2: 100% on RA   Labs:  WBC 14.5, Na 130, BUN/Cr 35/1.1, AlkP 134, , CK 3579   ABG: pH: , PO2: , PCO2: , HCO3: , SpO2: %  UA: 15 ketones, 30 protein, mod LE, neg nitrites, mod blood, few bact   CXR: suspicion for lung mass, pending official read   CT Head & C-spine:  No CT evidence of acute intracranial hemorrhage. Opacification and suspected bone erosion along the posterior lateral left mastoid air cells, additional evaluation is recommended. No acute fracture.  CT C/A/P: Findings suggest infectious/inflammatory cystitis.No suspicious pulmonary finding. Negative for adenopathy or metastatic disease. Fecal obstipation.  EKG: pending   Received in the ED: s/p 500cc bolus, rocephin 1g IVPB    89 yo M with PMH HTN, HLD, spinal stenosis, BPH, Gout, hypothyroidism, insomnia, and chronic indwelling stewart catheter BIBEMS presents to ED after a fall. Per pt, he was eating dinner around 6-7PM last night when he dropped his saltshaker. He attempted to clean it up by himself when he lost balance and fell from his seat onto the floor. During the episode, he denies having trauma to his head, loss of consciousness, headache, dizziness, or weakness. He attempted to both get up to his seat and crawl on the floor from his seat to his phone, which he was unable to do. He remained on the floor until ~11AM, when his son was unable to get in contact with him and called EMS. Pt was found to be on the floor on arrival.     Denies fever, chills, chest pain, palpitations, SOB, cough, abdominal pain, nausea, vomiting, diarrhea, constipation, urinary frequency, urgency, or dysuria, headaches, changes in vision, dizziness, numbness, tingling.    ED Course:   Vitals: BP: 150/78, HR: 64, Temp: 97.4, RR: 18, SpO2: 100% on RA   Labs:  WBC 14.5, Na 130, BUN/Cr 35/1.1, AlkP 134, , CK 3579   ABG: pH: , PO2: , PCO2: , HCO3: , SpO2: %  UA: 15 ketones, 30 protein, mod LE, neg nitrites, mod blood, few bact   CXR: suspicion for lung mass, pending official read   CT Head & C-spine:  No CT evidence of acute intracranial hemorrhage. Opacification and suspected bone erosion along the posterior lateral left mastoid air cells, additional evaluation is recommended. No acute fracture.  CT C/A/P: Findings suggest infectious/inflammatory cystitis.No suspicious pulmonary finding. Negative for adenopathy or metastatic disease. Fecal obstipation.  EKG: pending   Received in the ED: s/p 500cc bolus, rocephin 1g IVPB

## 2024-02-10 NOTE — H&P ADULT - PROBLEM SELECTOR PLAN 5
Chronic, takes lasix 20mg QD (was increased to 40mg QD for two week duration, per their outpatient cardiology)   -  will hold lasix at this time

## 2024-02-10 NOTE — ED ADULT TRIAGE NOTE - CHIEF COMPLAINT QUOTE
Pt BIBA from home. Pt bent over last night to clean floor and unable to get back up. On floor since around 9:30 pm last night until neighbor found him. Denies pain but sustained laceration to posterior head and right ring finger. No bleeding noted at this time. Denies LOC. Denies blood thinners

## 2024-02-10 NOTE — H&P ADULT - PROBLEM SELECTOR PLAN 6
Chronic, takes amlodipine 2.5mg 2 tabs QAM, 1 tab QHS & Atenolol 25mg 1 tab QD  - Cont. home medications with hold parameters

## 2024-02-10 NOTE — H&P ADULT - NSHPREVIEWOFSYSTEMS_GEN_ALL_CORE
CONSTITUTIONAL: denies fever, chills, fatigue, weakness  HEENT: denies blurred vision, sore throat  SKIN: skin s  CARDIOVASCULAR: denies chest pain, chest pressure, palpitations  RESPIRATORY: denies shortness of breath, cough, sputum production  GASTROINTESTINAL: denies nausea, vomiting, diarrhea, abdominal pain, melena or hematochezia  GENITOURINARY: denies dysuria, discharge  NEUROLOGICAL: denies numbness, headache, focal weakness  MUSCULOSKELETAL: denies new joint pain, muscle aches  HEMATOLOGIC: denies gross bleeding, bruising CONSTITUTIONAL: denies fever, chills, fatigue, weakness  HEENT: denies blurred vision, sore throat  SKIN: skin lacerations head and B/L elbows   CARDIOVASCULAR: denies chest pain, chest pressure, palpitations  RESPIRATORY: denies shortness of breath, cough, sputum production  GASTROINTESTINAL: denies nausea, vomiting, diarrhea, abdominal pain, melena or hematochezia  GENITOURINARY: denies dysuria, discharge  NEUROLOGICAL: denies numbness, headache, focal weakness  MUSCULOSKELETAL: endorses muscle aches b/l shoulders, denies new joint pain  HEMATOLOGIC: denies gross bleeding, bruising

## 2024-02-10 NOTE — PATIENT PROFILE ADULT - FALL HARM RISK - HARM RISK INTERVENTIONS

## 2024-02-10 NOTE — H&P ADULT - NSHPSOCIALHISTORY_GEN_ALL_CORE
Denies tobacco, alcohol, and illicit drug use   Lives at home alone, uses walker to ambulate, independent ADLs  No dietary restrictions

## 2024-02-10 NOTE — ED PROVIDER NOTE - OBJECTIVE STATEMENT
89-year-old male with history of hypertension, hyperlipidemia, spinal stenosis, indwelling Neumann catheter and sees reports due to spinal stenosis) brought in by ambulance for lying on the floor since last night.  Patient states he dropped the saltshaker last night.  Tried to clean up the salt on the ground and states he had to sit himself on the floor.  Patient denies hitting head or LOC, however does have a superficial laceration to back of scalp.  Does not take any blood thinners.  Patient denies any injury or trauma from sitting himself on the floor.  States he did not fall to the ground.  Denies any chest pain or shortness of breath.  Denies any abdominal pain.  Patient states he tried to push himself up from the floor and also work his way to a telephone but was unsuccessful.  Has a superficial laceration/abrasion to right ring finger.  Son states patient texts him on a daily basis at 10 AM to check-in as patient lives by himself.  Is ambulatory with a walker.  Son did not receive a text message at 10 AM.  Son tried to call him about 1030 and 1115 and patient did not answer so son called EMS.  On arrival, EMS found patient on the floor.  EMS states patient denies any pain or complaints but was unable to get himself up from the floor since last night.  Patient does have an indwelling Neumann.  Was seen here 8 days ago for a clogged Neumann and had a change.  Denies any problems with the Neumann.  States has been draining well.  No urine discoloration.  No abdominal pain.  Patient was seen by urologist 2 days ago in office.  States he was checked for UTI, does not know the results.  Patient did have bacteria in urine 8 days ago but believed it was chronic colonization at that time due to no symptoms.  PCP Umesh Ambriz ([place])

## 2024-02-10 NOTE — ED ADULT NURSE NOTE - OBJECTIVE STATEMENT
Patient BIBA from home complaining of laceration to back of head and right hand s/p fall from last night. As per family was on the floor since around 9pm unti found by family and neighbor today. Denies LoC, denies blood thinner use. Patient is AOx4, safety precautions in place, awaiting evaluation

## 2024-02-10 NOTE — ED PROVIDER NOTE - PROGRESS NOTE DETAILS
labs pending. son reports stewart is not draining. will change stewart in ED Spoke with radiologist regarding CAT scan findings.  Suspects bony erosion posterior lateral left mastoid cells.  Consider metastasis.  Chest x-ray reviewed which show suspected lung mass.  Concerns for malignancy with metastasis.  CAT scan of chest abdomen pelvis ordered with IV contrast Spoke with radiologist regarding CAT scan findings.  Suspects bony erosion posterior lateral left mastoid cells.  Consider metastasis. Recommends MRI with gandolinium for further evaluation Chest x-ray reviewed which show suspected lung mass.  Concerns for malignancy with metastasis.  CAT scan of chest abdomen pelvis ordered with IV contrast Patient stable.  CPK elevated, concern for mild rhabdomyolysis.  Patient has a history of CHF and due to age, will need to give fluids cautiously.  CAT scan shows no evidence of lung mass.  Does show some findings suspicious for cystitis.  Patient had a urine culture performed 8 days ago which grew out Proteus.  Due to no symptoms, suspected contamination at that time.  Due to findings on CT of suspected cystitis, will treat with IV antibiotics and change Neumann.  Rocephin ordered.  Spoke with attending hospitalist, Dr. Angel Guzman.  Kindly accept patient for admission.  Patient uncertain of last tetanus status but believes it is updated.  Declines tetanus booster.  Laceration to scalp and finger superficial in nature, no indication for suture repair.  Abrasions cleaned in emergency room and bandage applied to finger. Stating to have some mild bruising to elbows. has full ROM and denies tenderness on exam. do not suspect fx

## 2024-02-10 NOTE — H&P ADULT - PROBLEM SELECTOR PLAN 1
Pt admitted after a fall and on the ground for ~16 hours   - CK elevated 3579 on admission  - s/p 500cc bolus in ED   - start gentle hydration 50cc/hr [id number]  - on lasix 20mg PO QD at home, holding at this time   - Repeat CK in AM, f/u results   - Pt does not want cardiology consult

## 2024-02-10 NOTE — H&P ADULT - NSHPADDITIONALINFOADULT_GEN_ALL_CORE
DVT PPx: Lovenox 40mg QD    PT Consulted DVT PPx: Lovenox 40mg QD    PT Consulted, f/u recs #Gout: Chronic, no recent active episodes, takes allopurinol at home   - Cont. home medication.     DVT PPx: Lovenox 40mg QD    PT Consulted, f/u recs

## 2024-02-10 NOTE — H&P ADULT - PROBLEM SELECTOR PLAN 8
Alprazolam .25mg 2 tab BID as needed & Mirtazepine 15mg QHS   - will start Alprazolam .5 QHS PRN   - will start Mirtazepine 15mg QHS Alprazolam .25mg 2 tab BID (?) as needed & Mirtazepine 15mg QHS   - will start Alprazolam .5 QHS PRN   - will start Mirtazepine 15mg QHS Alprazolam .25mg 2 tab BID  as needed & Mirtazepine 15mg QHS   - will continue Alprazolam .5 QHS PRN   - will start Mirtazepine 15mg QHS

## 2024-02-10 NOTE — H&P ADULT - NSICDXPASTMEDICALHX_GEN_ALL_CORE_FT
PAST MEDICAL HISTORY:  High cholesterol     HTN (hypertension)     Hypothyroidism     Insomnia     Spinal stenosis

## 2024-02-10 NOTE — H&P ADULT - PROBLEM SELECTOR PLAN 3
Na 130 on admission  - s/p 500cc bolus in ED  - start gentle hydration 50cc/hr [id number]  - monitor AM CMP

## 2024-02-10 NOTE — ED PROVIDER NOTE - DIFFERENTIAL DIAGNOSIS
Differentials include but not limited to rhabdomyolysis, dehydration, electrolyte abnormality, intracranial hemorrhage, skull fracture, infection Differential Diagnosis

## 2024-02-11 ENCOUNTER — TRANSCRIPTION ENCOUNTER (OUTPATIENT)
Age: 89
End: 2024-02-11

## 2024-02-11 LAB
ALBUMIN SERPL ELPH-MCNC: 2.4 G/DL — LOW (ref 3.3–5)
ALP SERPL-CCNC: 105 U/L — SIGNIFICANT CHANGE UP (ref 40–120)
ALT FLD-CCNC: 32 U/L — SIGNIFICANT CHANGE UP (ref 12–78)
ANION GAP SERPL CALC-SCNC: 5 MMOL/L — SIGNIFICANT CHANGE UP (ref 5–17)
ANION GAP SERPL CALC-SCNC: 6 MMOL/L — SIGNIFICANT CHANGE UP (ref 5–17)
AST SERPL-CCNC: 68 U/L — HIGH (ref 15–37)
BILIRUB SERPL-MCNC: 0.4 MG/DL — SIGNIFICANT CHANGE UP (ref 0.2–1.2)
BUN SERPL-MCNC: 27 MG/DL — HIGH (ref 7–23)
BUN SERPL-MCNC: 28 MG/DL — HIGH (ref 7–23)
CALCIUM SERPL-MCNC: 8.5 MG/DL — SIGNIFICANT CHANGE UP (ref 8.5–10.1)
CALCIUM SERPL-MCNC: 8.8 MG/DL — SIGNIFICANT CHANGE UP (ref 8.5–10.1)
CHLORIDE SERPL-SCNC: 100 MMOL/L — SIGNIFICANT CHANGE UP (ref 96–108)
CHLORIDE SERPL-SCNC: 99 MMOL/L — SIGNIFICANT CHANGE UP (ref 96–108)
CK SERPL-CCNC: 696 U/L — HIGH (ref 26–308)
CK SERPL-CCNC: 931 U/L — HIGH (ref 26–308)
CO2 SERPL-SCNC: 28 MMOL/L — SIGNIFICANT CHANGE UP (ref 22–31)
CO2 SERPL-SCNC: 28 MMOL/L — SIGNIFICANT CHANGE UP (ref 22–31)
CREAT SERPL-MCNC: 0.82 MG/DL — SIGNIFICANT CHANGE UP (ref 0.5–1.3)
CREAT SERPL-MCNC: 1 MG/DL — SIGNIFICANT CHANGE UP (ref 0.5–1.3)
EGFR: 68 ML/MIN/1.73M2 — SIGNIFICANT CHANGE UP
EGFR: 80 ML/MIN/1.73M2 — SIGNIFICANT CHANGE UP
GLUCOSE SERPL-MCNC: 112 MG/DL — HIGH (ref 70–99)
GLUCOSE SERPL-MCNC: 124 MG/DL — HIGH (ref 70–99)
HCT VFR BLD CALC: 32.5 % — LOW (ref 39–50)
HGB BLD-MCNC: 11.1 G/DL — LOW (ref 13–17)
MCHC RBC-ENTMCNC: 29.5 PG — SIGNIFICANT CHANGE UP (ref 27–34)
MCHC RBC-ENTMCNC: 34.2 GM/DL — SIGNIFICANT CHANGE UP (ref 32–36)
MCV RBC AUTO: 86.4 FL — SIGNIFICANT CHANGE UP (ref 80–100)
NRBC # BLD: 0 /100 WBCS — SIGNIFICANT CHANGE UP (ref 0–0)
PLATELET # BLD AUTO: 186 K/UL — SIGNIFICANT CHANGE UP (ref 150–400)
POTASSIUM SERPL-MCNC: 3 MMOL/L — LOW (ref 3.5–5.3)
POTASSIUM SERPL-MCNC: 4.6 MMOL/L — SIGNIFICANT CHANGE UP (ref 3.5–5.3)
POTASSIUM SERPL-SCNC: 3 MMOL/L — LOW (ref 3.5–5.3)
POTASSIUM SERPL-SCNC: 4.6 MMOL/L — SIGNIFICANT CHANGE UP (ref 3.5–5.3)
PROT SERPL-MCNC: 5.6 G/DL — LOW (ref 6–8.3)
RBC # BLD: 3.76 M/UL — LOW (ref 4.2–5.8)
RBC # FLD: 13.6 % — SIGNIFICANT CHANGE UP (ref 10.3–14.5)
SODIUM SERPL-SCNC: 133 MMOL/L — LOW (ref 135–145)
SODIUM SERPL-SCNC: 133 MMOL/L — LOW (ref 135–145)
WBC # BLD: 10.01 K/UL — SIGNIFICANT CHANGE UP (ref 3.8–10.5)
WBC # FLD AUTO: 10.01 K/UL — SIGNIFICANT CHANGE UP (ref 3.8–10.5)

## 2024-02-11 PROCEDURE — 99233 SBSQ HOSP IP/OBS HIGH 50: CPT

## 2024-02-11 RX ORDER — AMLODIPINE BESYLATE 2.5 MG/1
5 TABLET ORAL DAILY
Refills: 0 | Status: DISCONTINUED | OUTPATIENT
Start: 2024-02-11 | End: 2024-02-13

## 2024-02-11 RX ORDER — ALPRAZOLAM 0.25 MG
0.5 TABLET ORAL AT BEDTIME
Refills: 0 | Status: DISCONTINUED | OUTPATIENT
Start: 2024-02-11 | End: 2024-02-13

## 2024-02-11 RX ORDER — POTASSIUM CHLORIDE 20 MEQ
40 PACKET (EA) ORAL EVERY 4 HOURS
Refills: 0 | Status: COMPLETED | OUTPATIENT
Start: 2024-02-11 | End: 2024-02-11

## 2024-02-11 RX ORDER — MUPIROCIN 20 MG/G
1 OINTMENT TOPICAL
Refills: 0 | Status: DISCONTINUED | OUTPATIENT
Start: 2024-02-11 | End: 2024-02-13

## 2024-02-11 RX ORDER — SODIUM CHLORIDE 9 MG/ML
1000 INJECTION INTRAMUSCULAR; INTRAVENOUS; SUBCUTANEOUS
Refills: 0 | Status: DISCONTINUED | OUTPATIENT
Start: 2024-02-11 | End: 2024-02-13

## 2024-02-11 RX ADMIN — Medication 0.5 MILLIGRAM(S): at 21:53

## 2024-02-11 RX ADMIN — ATENOLOL 25 MILLIGRAM(S): 25 TABLET ORAL at 05:36

## 2024-02-11 RX ADMIN — AMLODIPINE BESYLATE 2.5 MILLIGRAM(S): 2.5 TABLET ORAL at 21:53

## 2024-02-11 RX ADMIN — MIRTAZAPINE 15 MILLIGRAM(S): 45 TABLET, ORALLY DISINTEGRATING ORAL at 21:53

## 2024-02-11 RX ADMIN — FINASTERIDE 5 MILLIGRAM(S): 5 TABLET, FILM COATED ORAL at 12:02

## 2024-02-11 RX ADMIN — Medication 300 MILLIGRAM(S): at 12:02

## 2024-02-11 RX ADMIN — Medication 40 MILLIEQUIVALENT(S): at 13:53

## 2024-02-11 RX ADMIN — CEFTRIAXONE 100 MILLIGRAM(S): 500 INJECTION, POWDER, FOR SOLUTION INTRAMUSCULAR; INTRAVENOUS at 05:38

## 2024-02-11 RX ADMIN — Medication 112 MICROGRAM(S): at 05:36

## 2024-02-11 RX ADMIN — Medication 400 MILLIGRAM(S): at 13:53

## 2024-02-11 RX ADMIN — Medication 40 MILLIEQUIVALENT(S): at 10:17

## 2024-02-11 RX ADMIN — Medication 400 MILLIGRAM(S): at 22:34

## 2024-02-11 RX ADMIN — Medication 400 MILLIGRAM(S): at 05:37

## 2024-02-11 RX ADMIN — PANTOPRAZOLE SODIUM 40 MILLIGRAM(S): 20 TABLET, DELAYED RELEASE ORAL at 05:36

## 2024-02-11 RX ADMIN — SIMVASTATIN 5 MILLIGRAM(S): 20 TABLET, FILM COATED ORAL at 22:34

## 2024-02-11 RX ADMIN — ENOXAPARIN SODIUM 40 MILLIGRAM(S): 100 INJECTION SUBCUTANEOUS at 05:37

## 2024-02-11 RX ADMIN — MUPIROCIN 1 APPLICATION(S): 20 OINTMENT TOPICAL at 17:56

## 2024-02-11 NOTE — DISCHARGE NOTE PROVIDER - NSDCMRMEDTOKEN_GEN_ALL_CORE_FT
allopurinol 300 mg oral tablet: 1 tab(s) orally once a day  ALPRAZolam 0.25 mg oral tablet: 2 tab(s) orally 2 times a day as needed for  insomnia  amLODIPine 2.5 mg oral tablet: 1 tab(s) orally once a day (at bedtime)  amLODIPine 5 mg oral tablet: 1 tab(s) orally once a day (in the morning)  atenolol 25 mg oral tablet: 1 tab(s) orally once a day  finasteride 5 mg oral tablet: 1 tab(s) orally once a day  furosemide 20 mg oral tablet: 1 tab(s) orally once a day  levothyroxine 112 mcg (0.112 mg) oral tablet: 1 tab(s) orally once a day  mirtazapine 15 mg oral tablet: 1 tab(s) orally once a day (at bedtime)  pantoprazole 40 mg oral delayed release tablet: 1 tab(s) orally once a day  pentoxifylline 400 mg oral tablet, extended release: 1 tab(s) orally 3 times a day with meals  simvastatin 5 mg oral tablet: 1 tab(s) orally once a day (at bedtime)   allopurinol 300 mg oral tablet: 1 tab(s) orally once a day  ALPRAZolam 0.25 mg oral tablet: 2 tab(s) orally 2 times a day as needed for  insomnia  amLODIPine 2.5 mg oral tablet: 1 tab(s) orally once a day (at bedtime)  amLODIPine 5 mg oral tablet: 1 tab(s) orally once a day (in the morning)  ascorbic acid 500 mg oral tablet: 1 tab(s) orally 2 times a day  atenolol 25 mg oral tablet: 1 tab(s) orally once a day  cefuroxime 500 mg oral tablet: 1 tab(s) orally every 12 hours last dose 2/14 PM  enoxaparin: 40 milligram(s) subcutaneous once a day while in rehabilitation facility  finasteride 5 mg oral tablet: 1 tab(s) orally once a day  furosemide 20 mg oral tablet: 1 tab(s) orally once a day  levothyroxine 112 mcg (0.112 mg) oral tablet: 1 tab(s) orally once a day  mirtazapine 15 mg oral tablet: 1 tab(s) orally once a day (at bedtime)  Multiple Vitamins oral tablet: 1 tab(s) orally once a day  pantoprazole 40 mg oral delayed release tablet: 1 tab(s) orally once a day  pentoxifylline 400 mg oral tablet, extended release: 1 tab(s) orally 3 times a day with meals  simvastatin 5 mg oral tablet: 1 tab(s) orally once a day (at bedtime)

## 2024-02-11 NOTE — CONSULT NOTE ADULT - ASSESSMENT
Patient is a 89 year old male with PMH of HTN, HLD, spinal stenosis, BPH, Gout, hypothyroidism, insomnia, and chronic indwelling stewart catheter BIBEMS presents to ED after a fall.     UTI in setting of indwelling stewart catheter   Leukocytosis may be d/t above v reactive to rhabdomyolysis and fall   - stewart exchanged in the ER - noted urine cloudy in stewart tubing so will give short course    - CT imaging with findings s/o infectious v inflammatory cystitis; fecal obstipation   - + UA with Ucx with Proteus mirabilis    - CT chest with no pneumonia or mass    - s/p ceftriaxone once in the ER    - WBC normalized, remains afebrile     Recommendations:  Continue on ceftriaxone 1g IV Q24h - complete total 5d course until 2/14  On discharge, can transition to cefuroxime 500mg PO BID   Continue rest of care per primary team     D/w son at bedside  Ramses Disla M.D.  [facility], Division of Infectious Diseases  [phone]  After 5pm on weekdays and all day on weekends - please call [phone]

## 2024-02-11 NOTE — DISCHARGE NOTE PROVIDER - NSDCCPCAREPLAN_GEN_ALL_CORE_FT
PRINCIPAL DISCHARGE DIAGNOSIS  Diagnosis: Rhabdomyolysis  Assessment and Plan of Treatment: You presented to the hospital after a fall at home and were found to have rhabdomyolysis. This means that you had muscle breakdown, likely due to your fall and being down on the floor for a prolonged period of time. You were treated with fluids through your IV, and this resolved. You were seen by physical therapy, who recommended that you would benefit from going to a sub-acute rehab center. Please follow up with your primary care provider as soon as possible upon your discharge.      SECONDARY DISCHARGE DIAGNOSES  Diagnosis: Acute UTI  Assessment and Plan of Treatment:     Diagnosis: Multiple abrasions  Assessment and Plan of Treatment:     Diagnosis: Abnormal head CT  Assessment and Plan of Treatment:      PRINCIPAL DISCHARGE DIAGNOSIS  Diagnosis: Rhabdomyolysis  Assessment and Plan of Treatment: You presented to the hospital after a fall at home and were found to have rhabdomyolysis. This means that you had muscle breakdown, likely due to your fall and being down on the floor for a prolonged period of time. You were treated with fluids through your IV, and this resolved. You were seen by physical therapy, who recommended that you would benefit from going to a sub-acute rehab center. Your home diuretics were initially held as you needed fluids. Can now resume home furosemide 20mg daily upon discharge. Please follow up with your primary care provider as soon as possible upon your discharge.      SECONDARY DISCHARGE DIAGNOSES  Diagnosis: Acute UTI  Assessment and Plan of Treatment: You were found to have a urinary tract infection likely secondary to your chronic stewart. You were seen by ID and had IV antibiotics and transitioned to oral cefuroxime 500mg BID to finish 2/14.    Diagnosis: Abnormal head CT  Assessment and Plan of Treatment: CT head showed no evidence of acute intracranial hemorrhage. However, it also noted "opacification and suspected bone erosion along the posterior lateral left mastoid air cells, additional evaluation is recommended." There was nothing of note in our physical examination, but you should follow up with an ENT doctor.    Diagnosis: Multiple abrasions  Assessment and Plan of Treatment: Local care as needed.

## 2024-02-11 NOTE — CONSULT NOTE ADULT - SUBJECTIVE AND OBJECTIVE BOX
[facility] DIVISION OF INFECTIOUS DISEASES  ANH Mckinney S. Shah, Y. Patel, [facility]  [phone]  ([phone] - weekdays after 5pm and weekends)    JOIE DIAZ  89y, Male  [id number]    HPI:  Patient is a 89 year old male with PMH of HTN, HLD, spinal stenosis, BPH, Gout, hypothyroidism, insomnia, and chronic indwelling stewart catheter BIBEMS presents to ED after a fall. Per pt, he was eating dinner around 6-7PM last night when he dropped his saltshaker. He attempted to clean it up by himself when he lost balance and fell from his seat onto the floor. During the episode, he denies having trauma to his head, loss of consciousness, headache, dizziness, or weakness. He attempted to both get up to his seat and crawl on the floor from his seat to his phone, which he was unable to do. He remained on the floor until ~11AM, when his son was unable to get in contact with him and called EMS. Pt was found to be on the floor on arrival.   Denies fever, chills, chest pain, palpitations, SOB, cough, abdominal pain, nausea, vomiting, diarrhea, constipation, urinary frequency, urgency, or dysuria, headaches, changes in vision, dizziness, numbness, tingling.  ED Course: Vitals: BP: 150/78, HR: 64, Temp: 97.4, RR: 18, SpO2: 100% on RA   Labs:  WBC 14.5, Na 130, BUN/Cr 35/1.1, AlkP 134, , CK 3579   ABG: pH: , PO2: , PCO2: , HCO3: , SpO2: %  UA: 15 ketones, 30 protein, mod LE, neg nitrites, mod blood, few bact   CXR: suspicion for lung mass, pending official read   CT Head & C-spine:  No CT evidence of acute intracranial hemorrhage. Opacification and suspected bone erosion along the posterior lateral left mastoid air cells, additional evaluation is recommended. No acute fracture.  CT C/A/P: Findings suggest infectious/inflammatory cystitis.No suspicious pulmonary finding. Negative for adenopathy or metastatic disease. Fecal obstipation. Received in the ED: s/p 500cc bolus, rocephin 1g IVPB  (10 Feb 2024 16:42)  ROS: 14 point review of systems completed, pertinent positives and negatives as per HPI.    Allergies: Dilantin (Unknown)    PMH -- HTN (hypertension)  High cholesterol  Spinal stenosis  Hypothyroidism  Insomnia    PSH --S/P cholecystectomy  H/O inguinal hernia repair  S/P cardiac cath    FH -- No pertinent family history in first degree relatives  Social History -- denies tobacco, alcohol or illicit drug use    Physical Exam--  Vital Signs Last 24 Hrs  T(F): 97.5 (11 Feb 2024 11:42), Max: 97.7 (11 Feb 2024 04:45)  HR: 57 (11 Feb 2024 11:42) (57 - 77)  BP: 129/70 (11 Feb 2024 11:42) (113/61 - 147/74)  RR: 17 (11 Feb 2024 11:42) (16 - 17)  SpO2: 100% (11 Feb 2024 11:42) (96% - 100%)  General: no acute distress  HEENT: NC/AT, EOMI, anicteric, neck supple  Lungs: clear to auscultation bilaterally   Heart: S1, S2 present, normal rate   Abdomen: Soft. Nondistended. Nontender.  Neuro: AAOx3, no obvious focal deficits   Extremities: No cyanosis. LE edema L>R  Skin: Warm. Dry. No visible rash.   Lines: PIV; stewart with cloudy urine in tubing     Laboratory & Imaging Data--  CBC:                       11.1   10.01 )-----------( 186      ( 11 Feb 2024 08:45 )             32.5     WBC Count: 10.01 K/uL (02-11-24 @ 08:45)  WBC Count: 14.50 K/uL (02-10-24 @ 13:30)    CMP: 02-11    133<L>  |  100  |  27<H>  ----------------------------<  124<H>  4.6   |  28  |  1.00    Ca    8.8      11 Feb 2024 16:45    TPro  5.6<L>  /  Alb  2.4<L>  /  TBili  0.4  /  DBili  x   /  AST  68<H>  /  ALT  32  /  AlkPhos  105  02-11    LIVER FUNCTIONS - ( 11 Feb 2024 08:45 )  Alb: 2.4 g/dL / Pro: 5.6 g/dL / ALK PHOS: 105 U/L / ALT: 32 U/L / AST: 68 U/L / GGT: x           Urinalysis (02.10.24 @ 16:15)    Blood, Urine: Moderate   pH Urine: 6.5   Glucose Qualitative, Urine: Negative mg/dL   Color: Yellow   Urine Appearance: Clear   Bilirubin: Negative   Ketone - Urine: 15 mg/dL   Specific Gravity: 1.023   Protein, Urine: 30 mg/dL   Urobilinogen: 0.2 mg/dL   Nitrite: Negative   Leukocyte Esterase Concentration: Moderate    Microbiology: reviewed  Culture - Urine (collected 02-01-24 @ 18:16)  Source: Clean Catch Clean Catch (Midstream)  Final Report (02-04-24 @ 14:12):    >100,000 CFU/ml Proteus mirabilis    <10,000 CFU/ml Normal Urogenital sona present  Organism: Proteus mirabilis (02-04-24 @ 14:12)  Organism: Proteus mirabilis (02-04-24 @ 14:12)      Method Type: ELY      -  Amoxicillin/Clavulanic Acid: S <=8/4      -  Ampicillin: R >16 These ampicillin results predict results for amoxicillin      -  Ampicillin/Sulbactam: S <=4/2      -  Aztreonam: S <=4      -  Cefazolin: S 16 For uncomplicated UTI with K. pneumoniae, E. coli, or P. mirablis: ELY <=16 is sensitive and ELY >=32 is resistant. This also predicts results for oral agents cefaclor, cefdinir, cefpodoxime, cefprozil, cefuroxime axetil, cephalexin and locarbeffor uncomplicated UTI. Note that some isolates may be susceptible to these agents while testing resistant to cefazolin.      -  Cefepime: S <=2      -  Cefoxitin: S <=8      -  Ceftriaxone: S <=1      -  Cefuroxime: S <=4      -  Ciprofloxacin: S <=0.25      -  Ertapenem: S <=0.5      -  Gentamicin: S <=2      -  Levofloxacin: S <=0.5      -  Meropenem: S <=1      -  Nitrofurantoin: R 64 Should not be used to treat pyelonephritis      -  Piperacillin/Tazobactam: S <=8      -  Tobramycin: S <=2      -  Trimethoprim/Sulfamethoxazole: R >2/38    Radiology--reviewed  < from: CT Chest Abdomen and Pelvis w/ IV Cont (02.10.24 @ 15:36) >  IMPRESSION:    Findings suggest infectious/inflammatory cystitis.    No suspicious pulmonary finding. Negative for adenopathy or metastatic   disease.    Fecal obstipation.    < end of copied text >    < from: CT Head No Cont (02.10.24 @ 14:06) >  IMPRESSIONS:    Head CT: No CT evidence of acute intracranial hemorrhage.  Opacification and suspected bone erosion along the posterior lateral left   mastoid air cells, additional evaluation is recommended.    C-spine CT:  No acute fracture.    Discussed with CHRISTAL Mota in the ED at 2:48 PM.    < end of copied text >  < from: Xray Chest 1 View- PORTABLE-Routine (Xray Chest 1 View- PORTABLE-Routine .) (02.10.24 @ 13:47) >  IMPRESSION:  No acute pulmonary pathology.    < end of copied text >    Active Medications--  acetaminophen     Tablet .. 650 milliGRAM(s) Oral every 6 hours PRN  allopurinol 300 milliGRAM(s) Oral daily  ALPRAZolam 0.5 milliGRAM(s) Oral at bedtime  aluminum hydroxide/magnesium hydroxide/simethicone Suspension 30 milliLiter(s) Oral every 4 hours PRN  amLODIPine   Tablet 2.5 milliGRAM(s) Oral at bedtime  amLODIPine   Tablet 5 milliGRAM(s) Oral daily  atenolol  Tablet 25 milliGRAM(s) Oral daily  cefTRIAXone   IVPB 1000 milliGRAM(s) IV Intermittent every 24 hours  enoxaparin Injectable 40 milliGRAM(s) SubCutaneous every 24 hours  finasteride 5 milliGRAM(s) Oral daily  levothyroxine 112 MICROGram(s) Oral daily  melatonin 3 milliGRAM(s) Oral at bedtime PRN  mirtazapine 15 milliGRAM(s) Oral at bedtime  mupirocin 2% Ointment 1 Application(s) Topical two times a day  ondansetron Injectable 4 milliGRAM(s) IV Push every 8 hours PRN  pantoprazole    Tablet 40 milliGRAM(s) Oral before breakfast  pentoxifylline 400 milliGRAM(s) Oral three times a day  polyethylene glycol 3350 17 Gram(s) Oral daily PRN  simvastatin 5 milliGRAM(s) Oral at bedtime  sodium chloride 0.9%. 1000 milliLiter(s) IV Continuous <Continuous>  sodium chloride 0.9%. 1000 milliLiter(s) IV Continuous <Continuous>    Current Antimicrobials:   cefTRIAXone   IVPB 1000 milliGRAM(s) IV Intermittent every 24 hours    Prior/Completed Antimicrobials:  cefTRIAXone   IVPB    Immunologic:

## 2024-02-11 NOTE — PHYSICAL THERAPY INITIAL EVALUATION ADULT - ADDITIONAL COMMENTS
Pt lives alone in private house with 1 step (no railing) +3 steps with HR to enter house. First floor bedroom/bathroom. Utilized RW for indoor/outdoor mobility.

## 2024-02-11 NOTE — PHYSICAL THERAPY INITIAL EVALUATION ADULT - PERTINENT HX OF CURRENT PROBLEM, REHAB EVAL
Pt is a 89 y/o M with PMH HTN, HLD, spinal stenosis, BPH, Gout, hypothyroidism, insomnia, and chronic indwelling steawrt catheter BIBEMS presents to ED after a fall. Admit for Rhabdomyolysis 2/2 fall and + urine culture.

## 2024-02-11 NOTE — PROGRESS NOTE ADULT - PROBLEM SELECTOR PLAN 1
Pt admitted after a fall and on the ground for ~16 hours   - CK elevated 3579 -> 931. Repeat CK 17:00  - s/p 500cc bolus in ED, continue on gentle hydration, NS 50cc/hr   - on lasix 20mg PO QD at home, holding at this time   - leukocytosis likely reactive to fall  - Cr downtrending  - Pt does not want cardiology consult

## 2024-02-11 NOTE — DISCHARGE NOTE PROVIDER - HOSPITAL COURSE
ADMISSION DATE:  02-10-24    ---  FROM ADMISSION H+P:   HPI:  89 yo M with PMH HTN, HLD, spinal stenosis, BPH, Gout, hypothyroidism, insomnia, and chronic indwelling stewart catheter BIBEMS presents to ED after a fall. Per pt, he was eating dinner around 6-7PM last night when he dropped his saltshaker. He attempted to clean it up by himself when he lost balance and fell from his seat onto the floor. During the episode, he denies having trauma to his head, loss of consciousness, headache, dizziness, or weakness. He attempted to both get up to his seat and crawl on the floor from his seat to his phone, which he was unable to do. He remained on the floor until ~11AM, when his son was unable to get in contact with him and called EMS. Pt was found to be on the floor on arrival.     Denies fever, chills, chest pain, palpitations, SOB, cough, abdominal pain, nausea, vomiting, diarrhea, constipation, urinary frequency, urgency, or dysuria, headaches, changes in vision, dizziness, numbness, tingling.    ED Course:   Vitals: BP: 150/78, HR: 64, Temp: 97.4, RR: 18, SpO2: 100% on RA   Labs:  WBC 14.5, Na 130, BUN/Cr 35/1.1, AlkP 134, , CK 3579   ABG: pH: , PO2: , PCO2: , HCO3: , SpO2: %  UA: 15 ketones, 30 protein, mod LE, neg nitrites, mod blood, few bact   CXR: suspicion for lung mass, pending official read   CT Head & C-spine:  No CT evidence of acute intracranial hemorrhage. Opacification and suspected bone erosion along the posterior lateral left mastoid air cells, additional evaluation is recommended. No acute fracture.  CT C/A/P: Findings suggest infectious/inflammatory cystitis.No suspicious pulmonary finding. Negative for adenopathy or metastatic disease. Fecal obstipation.  EKG: pending   Received in the ED: s/p 500cc bolus, rocephin 1g IVPB    (10 Feb 2024 16:42)      ---  HOSPITAL COURSE/PERTINENT LABS/PROCEDURES PERFORMED/PENDING TESTS:  The patient was admitted to the telemetry floor for rhabdomyolysis. He was continued on mIVF with improvement in his CK. His stewart was changes in the ED and his urinalysis was positive for moderate LE, moderate blood, and few bacteria. Given prior culture 2/1 with proteus mirabilis, rocephin was started and ID was consulted. Urine culture showed _____. The patient was seen by physical therapy who recommended [facility].     The patient was seen and examined on the day of discharge. The patient is medically optimized for discharge to ***.    ---  PATIENT CONDITION:  - stable    ---  PHYSICAL EXAM ON DAY OF DISCHARGE:    ---  CONSULTANTS:   ALLAN Disla  ---  ADVANCED CARE PLANNING:  - Code status:      - MOLST completed:      [  ] NO     [  ] YES    ---  TIME SPENT:  I, the attending physician, was physically present for the key portions of the evaluation and management (E/M) service provided. The total amount of time spent reviewing the hospital notes, laboratory values, imaging findings, assessing/counseling the patient, discussing with consultant physicians, social work, nursing staff was -- minutes ADMISSION DATE:  02-10-24    ---  FROM ADMISSION H+P:   HPI:  89 yo M with PMH HTN, HLD, spinal stenosis, BPH, Gout, hypothyroidism, insomnia, and chronic indwelling stewart catheter BIBEMS presents to ED after a fall. Per pt, he was eating dinner around 6-7PM last night when he dropped his saltshaker. He attempted to clean it up by himself when he lost balance and fell from his seat onto the floor. During the episode, he denies having trauma to his head, loss of consciousness, headache, dizziness, or weakness. He attempted to both get up to his seat and crawl on the floor from his seat to his phone, which he was unable to do. He remained on the floor until ~11AM, when his son was unable to get in contact with him and called EMS. Pt was found to be on the floor on arrival.     Denies fever, chills, chest pain, palpitations, SOB, cough, abdominal pain, nausea, vomiting, diarrhea, constipation, urinary frequency, urgency, or dysuria, headaches, changes in vision, dizziness, numbness, tingling.    ED Course:   Vitals: BP: 150/78, HR: 64, Temp: 97.4, RR: 18, SpO2: 100% on RA   Labs:  WBC 14.5, Na 130, BUN/Cr 35/1.1, AlkP 134, , CK 3579   ABG: pH: , PO2: , PCO2: , HCO3: , SpO2: %  UA: 15 ketones, 30 protein, mod LE, neg nitrites, mod blood, few bact   CXR: suspicion for lung mass, pending official read   CT Head & C-spine:  No CT evidence of acute intracranial hemorrhage. Opacification and suspected bone erosion along the posterior lateral left mastoid air cells, additional evaluation is recommended. No acute fracture.  CT C/A/P: Findings suggest infectious/inflammatory cystitis.No suspicious pulmonary finding. Negative for adenopathy or metastatic disease. Fecal obstipation.  EKG: pending   Received in the ED: s/p 500cc bolus, rocephin 1g IVPB    (10 Feb 2024 16:42)      ---  HOSPITAL COURSE/PERTINENT LABS/PROCEDURES PERFORMED/PENDING TESTS:  The patient was admitted to the telemetry floor for rhabdomyolysis. He was continued on mIVF with improvement in his CK. His stewart was changes in the ED and his urinalysis was positive for moderate LE, moderate blood, and few bacteria. Given prior culture 2/1 with proteus mirabilis, rocephin was started and ID was consulted. Urine culture grew >100K gram negative rods. Blood cultures negative. ID followed and recommended cefuroxime 500mg BID through 2/14.The patient was seen by physical therapy who recommended [facility].     The patient was seen and examined on the day of discharge. The patient is medically optimized for discharge to [facility].    ---  PATIENT CONDITION:  - stable    ---  CONSULTANTS:   ALLAN Disla ADMISSION DATE:  02-10-24    ---  FROM ADMISSION H+P:   HPI:  89 yo M with PMH HTN, HLD, spinal stenosis, BPH, Gout, hypothyroidism, insomnia, and chronic indwelling stewart catheter BIBEMS presents to ED after a fall. Per pt, he was eating dinner around 6-7PM last night when he dropped his saltshaker. He attempted to clean it up by himself when he lost balance and fell from his seat onto the floor. During the episode, he denies having trauma to his head, loss of consciousness, headache, dizziness, or weakness. He attempted to both get up to his seat and crawl on the floor from his seat to his phone, which he was unable to do. He remained on the floor until ~11AM, when his son was unable to get in contact with him and called EMS. Pt was found to be on the floor on arrival.     Denies fever, chills, chest pain, palpitations, SOB, cough, abdominal pain, nausea, vomiting, diarrhea, constipation, urinary frequency, urgency, or dysuria, headaches, changes in vision, dizziness, numbness, tingling.    ED Course:   Vitals: BP: 150/78, HR: 64, Temp: 97.4, RR: 18, SpO2: 100% on RA   Labs:  WBC 14.5, Na 130, BUN/Cr 35/1.1, AlkP 134, , CK 3579   ABG: pH: , PO2: , PCO2: , HCO3: , SpO2: %  UA: 15 ketones, 30 protein, mod LE, neg nitrites, mod blood, few bact   CXR: suspicion for lung mass, pending official read   CT Head & C-spine:  No CT evidence of acute intracranial hemorrhage. Opacification and suspected bone erosion along the posterior lateral left mastoid air cells, additional evaluation is recommended. No acute fracture.  CT C/A/P: Findings suggest infectious/inflammatory cystitis.No suspicious pulmonary finding. Negative for adenopathy or metastatic disease. Fecal obstipation.  EKG: pending   Received in the ED: s/p 500cc bolus, rocephin 1g IVPB    (10 Feb 2024 16:42)      ---  HOSPITAL COURSE/PERTINENT LABS/PROCEDURES PERFORMED/PENDING TESTS:  The patient was admitted to the telemetry floor for rhabdomyolysis. He was continued on mIVF with improvement in his CK. His stewart was changes in the ED and his urinalysis was positive for moderate LE, moderate blood, and few bacteria. Given prior culture 2/1 with proteus mirabilis, rocephin was started and ID was consulted. Urine culture grew >100K gram negative rods. Blood cultures negative. ID followed and recommended cefuroxime 500mg BID through 2/14.The patient was seen by physical therapy who recommended subacute rehabilitation facility.     The patient was seen and examined on the day of discharge. The patient is medically optimized for discharge to [facility].    ---  PATIENT CONDITION:  - stable    ---  CONSULTANTS:   ALLAN Disla

## 2024-02-11 NOTE — DISCHARGE NOTE PROVIDER - ATTENDING DISCHARGE PHYSICAL EXAMINATION:
T(C): 36.8 (02-13-24 @ 05:13), Max: 36.8 (02-13-24 @ 05:13)  HR: 65 (02-13-24 @ 05:13) (65 - 70)  BP: 133/70 (02-13-24 @ 05:13) (109/58 - 133/70)  RR: 17 (02-13-24 @ 05:13) (17 - 17)  SpO2: 96% (02-13-24 @ 05:13) (93% - 96%)    General: No apparent distress  Head: normocephalic, atraumatic  Eyes: EOMI, anicteric  ENT: moist mucous membranes, no pharyngeal exudates  Heart: RRR, S1, S2  Chest: CTA b/l, no rales, rhonchi, or wheezes  Abd: BS+, soft, NT, ND  Back: no tenderness  Extr: no edema or cyanosis  Skin: warm, well perfused  Neuro: AA&Ox3, no focal weakness, sensation to light touch intact  Psych: normal affect

## 2024-02-12 LAB
ALBUMIN SERPL ELPH-MCNC: 2.3 G/DL — LOW (ref 3.3–5)
ALP SERPL-CCNC: 99 U/L — SIGNIFICANT CHANGE UP (ref 40–120)
ALT FLD-CCNC: 30 U/L — SIGNIFICANT CHANGE UP (ref 12–78)
ANION GAP SERPL CALC-SCNC: 6 MMOL/L — SIGNIFICANT CHANGE UP (ref 5–17)
AST SERPL-CCNC: 46 U/L — HIGH (ref 15–37)
BASOPHILS # BLD AUTO: 0.04 K/UL — SIGNIFICANT CHANGE UP (ref 0–0.2)
BASOPHILS NFR BLD AUTO: 0.5 % — SIGNIFICANT CHANGE UP (ref 0–2)
BILIRUB SERPL-MCNC: 0.3 MG/DL — SIGNIFICANT CHANGE UP (ref 0.2–1.2)
BUN SERPL-MCNC: 19 MG/DL — SIGNIFICANT CHANGE UP (ref 7–23)
CALCIUM SERPL-MCNC: 8.1 MG/DL — LOW (ref 8.5–10.1)
CHLORIDE SERPL-SCNC: 102 MMOL/L — SIGNIFICANT CHANGE UP (ref 96–108)
CO2 SERPL-SCNC: 27 MMOL/L — SIGNIFICANT CHANGE UP (ref 22–31)
CREAT SERPL-MCNC: 0.83 MG/DL — SIGNIFICANT CHANGE UP (ref 0.5–1.3)
EGFR: 80 ML/MIN/1.73M2 — SIGNIFICANT CHANGE UP
EOSINOPHIL # BLD AUTO: 0.12 K/UL — SIGNIFICANT CHANGE UP (ref 0–0.5)
EOSINOPHIL NFR BLD AUTO: 1.5 % — SIGNIFICANT CHANGE UP (ref 0–6)
GLUCOSE SERPL-MCNC: 117 MG/DL — HIGH (ref 70–99)
HCT VFR BLD CALC: 31.1 % — LOW (ref 39–50)
HGB BLD-MCNC: 10.5 G/DL — LOW (ref 13–17)
IMM GRANULOCYTES NFR BLD AUTO: 0.8 % — SIGNIFICANT CHANGE UP (ref 0–0.9)
LYMPHOCYTES # BLD AUTO: 1.06 K/UL — SIGNIFICANT CHANGE UP (ref 1–3.3)
LYMPHOCYTES # BLD AUTO: 13.4 % — SIGNIFICANT CHANGE UP (ref 13–44)
MCHC RBC-ENTMCNC: 29.6 PG — SIGNIFICANT CHANGE UP (ref 27–34)
MCHC RBC-ENTMCNC: 33.8 GM/DL — SIGNIFICANT CHANGE UP (ref 32–36)
MCV RBC AUTO: 87.6 FL — SIGNIFICANT CHANGE UP (ref 80–100)
MONOCYTES # BLD AUTO: 0.78 K/UL — SIGNIFICANT CHANGE UP (ref 0–0.9)
MONOCYTES NFR BLD AUTO: 9.9 % — SIGNIFICANT CHANGE UP (ref 2–14)
NEUTROPHILS # BLD AUTO: 5.85 K/UL — SIGNIFICANT CHANGE UP (ref 1.8–7.4)
NEUTROPHILS NFR BLD AUTO: 73.9 % — SIGNIFICANT CHANGE UP (ref 43–77)
NRBC # BLD: 0 /100 WBCS — SIGNIFICANT CHANGE UP (ref 0–0)
PLATELET # BLD AUTO: 181 K/UL — SIGNIFICANT CHANGE UP (ref 150–400)
POTASSIUM SERPL-MCNC: 3.8 MMOL/L — SIGNIFICANT CHANGE UP (ref 3.5–5.3)
POTASSIUM SERPL-SCNC: 3.8 MMOL/L — SIGNIFICANT CHANGE UP (ref 3.5–5.3)
PROT SERPL-MCNC: 5.6 G/DL — LOW (ref 6–8.3)
RBC # BLD: 3.55 M/UL — LOW (ref 4.2–5.8)
RBC # FLD: 13.5 % — SIGNIFICANT CHANGE UP (ref 10.3–14.5)
SODIUM SERPL-SCNC: 135 MMOL/L — SIGNIFICANT CHANGE UP (ref 135–145)
WBC # BLD: 7.91 K/UL — SIGNIFICANT CHANGE UP (ref 3.8–10.5)
WBC # FLD AUTO: 7.91 K/UL — SIGNIFICANT CHANGE UP (ref 3.8–10.5)

## 2024-02-12 PROCEDURE — 99232 SBSQ HOSP IP/OBS MODERATE 35: CPT | Mod: GC

## 2024-02-12 RX ORDER — ASCORBIC ACID 60 MG
500 TABLET,CHEWABLE ORAL
Refills: 0 | Status: CANCELLED | OUTPATIENT
Start: 2024-02-12 | End: 2024-02-13

## 2024-02-12 RX ORDER — CEFUROXIME AXETIL 250 MG
500 TABLET ORAL EVERY 12 HOURS
Refills: 0 | Status: DISCONTINUED | OUTPATIENT
Start: 2024-02-13 | End: 2024-02-13

## 2024-02-12 RX ADMIN — SODIUM CHLORIDE 50 MILLILITER(S): 9 INJECTION INTRAMUSCULAR; INTRAVENOUS; SUBCUTANEOUS at 01:41

## 2024-02-12 RX ADMIN — Medication 400 MILLIGRAM(S): at 06:09

## 2024-02-12 RX ADMIN — ATENOLOL 25 MILLIGRAM(S): 25 TABLET ORAL at 06:08

## 2024-02-12 RX ADMIN — Medication 112 MICROGRAM(S): at 06:07

## 2024-02-12 RX ADMIN — Medication 300 MILLIGRAM(S): at 11:49

## 2024-02-12 RX ADMIN — Medication 400 MILLIGRAM(S): at 13:14

## 2024-02-12 RX ADMIN — MUPIROCIN 1 APPLICATION(S): 20 OINTMENT TOPICAL at 17:13

## 2024-02-12 RX ADMIN — MIRTAZAPINE 15 MILLIGRAM(S): 45 TABLET, ORALLY DISINTEGRATING ORAL at 22:32

## 2024-02-12 RX ADMIN — MUPIROCIN 1 APPLICATION(S): 20 OINTMENT TOPICAL at 06:31

## 2024-02-12 RX ADMIN — Medication 0.5 MILLIGRAM(S): at 22:30

## 2024-02-12 RX ADMIN — FINASTERIDE 5 MILLIGRAM(S): 5 TABLET, FILM COATED ORAL at 11:49

## 2024-02-12 RX ADMIN — SIMVASTATIN 5 MILLIGRAM(S): 20 TABLET, FILM COATED ORAL at 22:32

## 2024-02-12 RX ADMIN — CEFTRIAXONE 100 MILLIGRAM(S): 500 INJECTION, POWDER, FOR SOLUTION INTRAMUSCULAR; INTRAVENOUS at 06:08

## 2024-02-12 RX ADMIN — ENOXAPARIN SODIUM 40 MILLIGRAM(S): 100 INJECTION SUBCUTANEOUS at 06:08

## 2024-02-12 RX ADMIN — Medication 400 MILLIGRAM(S): at 22:32

## 2024-02-12 RX ADMIN — PANTOPRAZOLE SODIUM 40 MILLIGRAM(S): 20 TABLET, DELAYED RELEASE ORAL at 06:11

## 2024-02-12 RX ADMIN — AMLODIPINE BESYLATE 5 MILLIGRAM(S): 2.5 TABLET ORAL at 06:08

## 2024-02-12 NOTE — DIETITIAN INITIAL EVALUATION ADULT - PROBLEM SELECTOR PLAN 3
Na 130 on admission  - s/p 500cc bolus in ED  - start gentle hydration 50cc/hr [id number]  - monitor AM CMP

## 2024-02-12 NOTE — SOCIAL WORK PROGRESS NOTE - NSSWPROGRESSNOTE_GEN_ALL_CORE
Pt plan for subacute rehab, ELIAS faxed to [facility] and they have medically accepted pt and opened case with pending ref# [id number]. clinicals faxed to [phone]. SW will continue to follow for safe transition to [facility] when auth secured and pt is medically stable.

## 2024-02-12 NOTE — PROGRESS NOTE ADULT - PROBLEM SELECTOR PLAN 12
Chronic, takes finasteride 5mg QD   - Cont. home medication
Chronic, takes finasteride 5mg QD   - Cont. home medication

## 2024-02-12 NOTE — DIETITIAN INITIAL EVALUATION ADULT - PERTINENT LABORATORY DATA
02-12    135  |  102  |  19  ----------------------------<  117<H>  3.8   |  27  |  0.83    Ca    8.1<L>      12 Feb 2024 08:00    TPro  5.6<L>  /  Alb  2.3<L>  /  TBili  0.3  /  DBili  x   /  AST  46<H>  /  ALT  30  /  AlkPhos  99  02-12

## 2024-02-12 NOTE — DIETITIAN INITIAL EVALUATION ADULT - NSICDXPASTSURGICALHX_GEN_ALL_CORE_FT
PAST SURGICAL HISTORY:  H/O inguinal hernia repair     S/P cardiac cath     S/P cholecystectomy

## 2024-02-12 NOTE — DIETITIAN INITIAL EVALUATION ADULT - SIGNS/SYMPTOMS
as evidenced by Stage I, II and DTI. as evidenced by moderate muscle depletion/fat loss, gradual wt loss of 15.6% over the years

## 2024-02-12 NOTE — DIETITIAN INITIAL EVALUATION ADULT - ETIOLOGY
related to increased nutrient needs for wound healing  related to probable decreased po intake over the years/advanced age

## 2024-02-12 NOTE — DIETITIAN INITIAL EVALUATION ADULT - PROBLEM SELECTOR PLAN 2
Pt with chronic indwelling stewart, UCx collected >100,000 Proteus susceptible to rocephin   - UA with 15 ketones, 30 protein, mod LE, neg nitrites, mod blood, few bacteria   - New stewart placed in the ED; to treat colonization   - s/p rocephin 1g in the ED, will continue at this time pending ID recs, though possible it is colonozation, however patient with elevated WBC count  - repeat UCx ordered, f/u results  - Monitor daily CBC   - ID consulted (Dr. Disla), f/u recs

## 2024-02-12 NOTE — CARE COORDINATION ASSESSMENT. - NSPASTMEDSURGHISTORY_GEN_ALL_CORE_FT
PAST MEDICAL & SURGICAL HISTORY:  HTN (hypertension)      High cholesterol      Insomnia      Hypothyroidism      Spinal stenosis      S/P cardiac cath      H/O inguinal hernia repair      S/P cholecystectomy

## 2024-02-12 NOTE — DIETITIAN INITIAL EVALUATION ADULT - PERTINENT MEDS FT
MEDICATIONS  (STANDING):  allopurinol 300 milliGRAM(s) Oral daily  ALPRAZolam 0.5 milliGRAM(s) Oral at bedtime  amLODIPine   Tablet 5 milliGRAM(s) Oral daily  amLODIPine   Tablet 2.5 milliGRAM(s) Oral at bedtime  atenolol  Tablet 25 milliGRAM(s) Oral daily  cefTRIAXone   IVPB 1000 milliGRAM(s) IV Intermittent every 24 hours  enoxaparin Injectable 40 milliGRAM(s) SubCutaneous every 24 hours  finasteride 5 milliGRAM(s) Oral daily  levothyroxine 112 MICROGram(s) Oral daily  mirtazapine 15 milliGRAM(s) Oral at bedtime  mupirocin 2% Ointment 1 Application(s) Topical two times a day  pantoprazole    Tablet 40 milliGRAM(s) Oral before breakfast  pentoxifylline 400 milliGRAM(s) Oral three times a day  simvastatin 5 milliGRAM(s) Oral at bedtime  sodium chloride 0.9%. 1000 milliLiter(s) (50 mL/Hr) IV Continuous <Continuous>  sodium chloride 0.9%. 1000 milliLiter(s) (50 mL/Hr) IV Continuous <Continuous>    MEDICATIONS  (PRN):  acetaminophen     Tablet .. 650 milliGRAM(s) Oral every 6 hours PRN Temp greater or equal to 38C (100.4F), Mild Pain (1 - 3)  aluminum hydroxide/magnesium hydroxide/simethicone Suspension 30 milliLiter(s) Oral every 4 hours PRN Dyspepsia  melatonin 3 milliGRAM(s) Oral at bedtime PRN Insomnia  ondansetron Injectable 4 milliGRAM(s) IV Push every 8 hours PRN Nausea and/or Vomiting  polyethylene glycol 3350 17 Gram(s) Oral daily PRN Constipation

## 2024-02-12 NOTE — PROGRESS NOTE ADULT - PROBLEM SELECTOR PLAN 9
Chronic, takes levothyroxine 112mcg QD   - Cont. home medication
Chronic, takes levothyroxine 112mcg QD   - Cont. home medication

## 2024-02-12 NOTE — PROGRESS NOTE ADULT - PROBLEM SELECTOR PLAN 8
Alprazolam .25mg 2 tab BID  as needed & Mirtazepine 15mg QHS   - will continue Alprazolam .5 QHS PRN   - will start Mirtazepine 15mg QHS
Alprazolam .25mg 2 tab BID  as needed & Mirtazepine 15mg QHS   - will continue Alprazolam .5 QHS PRN   - will start Mirtazepine 15mg QHS

## 2024-02-12 NOTE — PROGRESS NOTE ADULT - PROBLEM SELECTOR PLAN 6
Chronic, takes amlodipine 2.5mg 2 tabs QAM, 1 tab QHS & Atenolol 25mg 1 tab QD  - Cont. home medications with hold parameters
Chronic, takes amlodipine 2.5mg 2 tabs QAM, 1 tab QHS & Atenolol 25mg 1 tab QD  - Cont. home medications with hold parameters

## 2024-02-12 NOTE — PROGRESS NOTE ADULT - TIME BILLING
Note written by attending, see above.  Time spent: 55min. More than 50% of the visit was spent counseling the patient on medical condition and coordination of care
Pt seen + examined. Case discussed with resident. Agree with assessment and plan above (edited by me in detail):  Time spent: 40min. More than 50% of the visit was spent counseling the patient on medical condition and coordination of care, excluding teaching time.

## 2024-02-12 NOTE — DIETITIAN INITIAL EVALUATION ADULT - NSFNSPHYEXAMSKINFT_GEN_A_CORE
Left/right elbow DTI, left hip DTI, sacrum Stage I, left ankle outer malleolus Stage II, right/left upper back Stage I

## 2024-02-12 NOTE — PROGRESS NOTE ADULT - PROBLEM SELECTOR PLAN 10
How Severe Is Your Acne?: mild
Is This A New Presentation, Or A Follow-Up?: Acne
Chronic, takes pentoxifylline ER 400mg 1 tab TID  w/ food  - Cont. home medication
Chronic, takes pentoxifylline ER 400mg 1 tab TID  w/ food  - Cont. home medication

## 2024-02-12 NOTE — PROGRESS NOTE ADULT - PROBLEM SELECTOR PLAN 5
Chronic, takes lasix 20mg QD (was increased to 40mg QD for two week duration, per their outpatient cardiology)   -  will hold lasix at this time
Chronic, takes lasix 20mg QD (was increased to 40mg QD for two week duration, per their outpatient cardiology)   -  will hold lasix at this time

## 2024-02-12 NOTE — DIETITIAN INITIAL EVALUATION ADULT - PROBLEM SELECTOR PLAN 8
Alprazolam .25mg 2 tab BID  as needed & Mirtazepine 15mg QHS   - will continue Alprazolam .5 QHS PRN   - will start Mirtazepine 15mg QHS

## 2024-02-12 NOTE — CARE COORDINATION ASSESSMENT. - OTHER PERTINENT DISCHARGE PLANNING INFORMATION:
SW met with this pt at bedside discussed name role elos and DC planning. Pts son present at bedside, PT recommending [facility]. Pt independent from home, family agrees to referral sent to local rehabs. Pt has a helper 2x a week, ambulates with a rollator at baseline. ELIAS requested, SW discussed with son will need Auth. SW to follow.

## 2024-02-12 NOTE — PROGRESS NOTE ADULT - PROBLEM SELECTOR PLAN 3
Na 130 on admission -> 133 after ivf resuscitation. continue gentle ivf   - monitor AM CMP  - currently resolved    #Hypokalemia- replete as necessary
Na 130 on admission -> 133 after ivf resuscitation. continue gentle ivf   - monitor AM CMP    #Hypokalemia- replete

## 2024-02-12 NOTE — PROGRESS NOTE ADULT - PROBLEM SELECTOR PLAN 4
AlkP 134,  on admission. downtrended.   - trend cmp
AlkP 134,  on admission. downtrended.   - trend cmp

## 2024-02-12 NOTE — DIETITIAN INITIAL EVALUATION ADULT - PROBLEM SELECTOR PLAN 1
Pt admitted after a fall and on the ground for ~16 hours   - CK elevated 3579 on admission  - s/p 500cc bolus in ED   - start gentle hydration 50cc/hr [id number]  - on lasix 20mg PO QD at home, holding at this time   - Repeat CK in AM, f/u results   - Pt does not want cardiology consult

## 2024-02-12 NOTE — PROGRESS NOTE ADULT - PROBLEM SELECTOR PLAN 2
Pt with chronic indwelling stewart, UCx (2/1) >100,000 Proteus susceptible to rocephin    - UA with 15 ketones, 30 protein, mod LE, neg nitrites, mod blood, few bacteria   - New stewart placed in the ED; to treat colonization   - s/p rocephin 1g in the ED, will continue at this time pending ID recs, though possible it is colonization, leukocytosis likely reactive - downtrending   - repeat UCx ordered, shows >100,000 gram negative rods  - Monitor daily CBC   - switch to oral cefuroxime today  - ID consulted (Dr. Disla), f/u recs
Pt with chronic indwelling stewart, UCx (2/1) >100,000 Proteus susceptible to rocephin    - UA with 15 ketones, 30 protein, mod LE, neg nitrites, mod blood, few bacteria   - New stewart placed in the ED; to treat colonization   - s/p rocephin 1g in the ED, will continue at this time pending ID recs, though possible it is colonization, leukocytosis likely reactive - downtrending   - repeat UCx ordered, f/u results  - Monitor daily CBC   - ID consulted (Dr. Disla), f/u recs
1.4

## 2024-02-12 NOTE — CARE COORDINATION ASSESSMENT. - NSCAREPROVIDERS_GEN_ALL_CORE_FT
CARE PROVIDERS:  Accepting Physician: Angel Guzman  Administration: Victor M Soto  Administration: Davon Howard  Administration: David Casey  Administration: Oziel Alegria  Administration: Winifred Weinberg  Admitting: Angel Guzman  Attending: Alyssa Rangel  Case Management: Behringer, Megan  Consultant: Ramses Disla  Consultant: Shahida Guzman  Consultant: Carol Aparicio  Consultant: Jairon Hare  ED ACP: Maryse Mota  ED Attending: Anup Domingo  ED Nurse: Victor M Banerjee  Emergency Medicine: Anup Domingo  Nurse: Valery Torres  Nurse: Bushra Dias  Ordered: Doctor, Unknown  Ordered: ADM, User  Ordered: ServiceAccount, SCMMLM  Override: Cornel Roque  PCA/Nursing Assistant: Valeri Sotelo  PCA/Nursing Assistant: Zhanna Donaldson  PCA/Nursing Assistant: Cat Denise  Physical Therapy: Aliyah Yañez  Primary Team: Natalya Peña  Primary Team: Alfonso Sharif  Primary Team: Haris Britton  Primary Team: Danny Banuelos  Primary Team: Alyssa Rangel  Registered Dietitian: Erika Canchola  : Nila Mortensen  Student: Madie Turner  Team: MARIO ALBERTO  Hospitalists, Team  UR// Supp. Assoc.: Marielos Bingham

## 2024-02-12 NOTE — DIETITIAN INITIAL EVALUATION ADULT - ORAL INTAKE PTA/DIET HISTORY
regular diet  doesn't add salt to his meals  cooks all meals  eats 3 meals per day (balanced meals with adequate protein as per pt recall)

## 2024-02-12 NOTE — PROGRESS NOTE ADULT - PROBLEM SELECTOR PLAN 11
Chronic, takes pantoprazole 40mg QD  - Cont. home medication
Chronic, takes pantoprazole 40mg QD  - Cont. home medication

## 2024-02-12 NOTE — DIETITIAN INITIAL EVALUATION ADULT - OTHER INFO
89 y/o male adm with rhabdomyolysis 2/2 fall and + urine culture. PMH HTN, HLD, spinal stenosis, BPH, gout, hypothyroidism, insomnia, and chronic indwelling stewart catheter. Pt visited at bedside this morning. Wants to get out of bed and walk with his walker because pt states that he was walking before he came into hospital. Nursing aware. Pt states that he eats well at home, 3 meals per day. Pt's wt was 160# and has gradually decreased. Pt states that he currently weighs 135#.

## 2024-02-12 NOTE — DIETITIAN INITIAL EVALUATION ADULT - LITERATURE/VIDEOS GIVEN
diet education deferred (pt is on a regular diet) eats balanced meals at home with adequate protein as per pt diet recall

## 2024-02-13 ENCOUNTER — TRANSCRIPTION ENCOUNTER (OUTPATIENT)
Age: 89
End: 2024-02-13

## 2024-02-13 VITALS
HEART RATE: 61 BPM | TEMPERATURE: 98 F | OXYGEN SATURATION: 98 % | SYSTOLIC BLOOD PRESSURE: 136 MMHG | RESPIRATION RATE: 17 BRPM | DIASTOLIC BLOOD PRESSURE: 63 MMHG

## 2024-02-13 LAB
-  AMIKACIN: SIGNIFICANT CHANGE UP
-  AZTREONAM: SIGNIFICANT CHANGE UP
-  CEFEPIME: SIGNIFICANT CHANGE UP
-  CEFTAZIDIME: SIGNIFICANT CHANGE UP
-  CIPROFLOXACIN: SIGNIFICANT CHANGE UP
-  IMIPENEM: SIGNIFICANT CHANGE UP
-  LEVOFLOXACIN: SIGNIFICANT CHANGE UP
-  MEROPENEM: SIGNIFICANT CHANGE UP
-  PIPERACILLIN/TAZOBACTAM: SIGNIFICANT CHANGE UP
ALBUMIN SERPL ELPH-MCNC: 2.4 G/DL — LOW (ref 3.3–5)
ALP SERPL-CCNC: 96 U/L — SIGNIFICANT CHANGE UP (ref 40–120)
ALT FLD-CCNC: 48 U/L — SIGNIFICANT CHANGE UP (ref 12–78)
ANION GAP SERPL CALC-SCNC: 4 MMOL/L — LOW (ref 5–17)
AST SERPL-CCNC: 57 U/L — HIGH (ref 15–37)
BILIRUB SERPL-MCNC: 0.4 MG/DL — SIGNIFICANT CHANGE UP (ref 0.2–1.2)
BUN SERPL-MCNC: 16 MG/DL — SIGNIFICANT CHANGE UP (ref 7–23)
CALCIUM SERPL-MCNC: 8.7 MG/DL — SIGNIFICANT CHANGE UP (ref 8.5–10.1)
CHLORIDE SERPL-SCNC: 103 MMOL/L — SIGNIFICANT CHANGE UP (ref 96–108)
CO2 SERPL-SCNC: 28 MMOL/L — SIGNIFICANT CHANGE UP (ref 22–31)
CREAT SERPL-MCNC: 0.85 MG/DL — SIGNIFICANT CHANGE UP (ref 0.5–1.3)
CULTURE RESULTS: ABNORMAL
EGFR: 79 ML/MIN/1.73M2 — SIGNIFICANT CHANGE UP
GLUCOSE SERPL-MCNC: 133 MG/DL — HIGH (ref 70–99)
HCT VFR BLD CALC: 34.5 % — LOW (ref 39–50)
HGB BLD-MCNC: 11.2 G/DL — LOW (ref 13–17)
MCHC RBC-ENTMCNC: 29.5 PG — SIGNIFICANT CHANGE UP (ref 27–34)
MCHC RBC-ENTMCNC: 32.5 GM/DL — SIGNIFICANT CHANGE UP (ref 32–36)
MCV RBC AUTO: 90.8 FL — SIGNIFICANT CHANGE UP (ref 80–100)
METHOD TYPE: SIGNIFICANT CHANGE UP
NRBC # BLD: 0 /100 WBCS — SIGNIFICANT CHANGE UP (ref 0–0)
ORGANISM # SPEC MICROSCOPIC CNT: ABNORMAL
ORGANISM # SPEC MICROSCOPIC CNT: SIGNIFICANT CHANGE UP
PLATELET # BLD AUTO: 181 K/UL — SIGNIFICANT CHANGE UP (ref 150–400)
POTASSIUM SERPL-MCNC: 3.8 MMOL/L — SIGNIFICANT CHANGE UP (ref 3.5–5.3)
POTASSIUM SERPL-SCNC: 3.8 MMOL/L — SIGNIFICANT CHANGE UP (ref 3.5–5.3)
PROT SERPL-MCNC: 5.9 G/DL — LOW (ref 6–8.3)
RBC # BLD: 3.8 M/UL — LOW (ref 4.2–5.8)
RBC # FLD: 13.8 % — SIGNIFICANT CHANGE UP (ref 10.3–14.5)
SODIUM SERPL-SCNC: 135 MMOL/L — SIGNIFICANT CHANGE UP (ref 135–145)
SPECIMEN SOURCE: SIGNIFICANT CHANGE UP
WBC # BLD: 7.59 K/UL — SIGNIFICANT CHANGE UP (ref 3.8–10.5)
WBC # FLD AUTO: 7.59 K/UL — SIGNIFICANT CHANGE UP (ref 3.8–10.5)

## 2024-02-13 PROCEDURE — 74177 CT ABD & PELVIS W/CONTRAST: CPT | Mod: MA

## 2024-02-13 PROCEDURE — 96374 THER/PROPH/DIAG INJ IV PUSH: CPT

## 2024-02-13 PROCEDURE — 97112 NEUROMUSCULAR REEDUCATION: CPT

## 2024-02-13 PROCEDURE — 36415 COLL VENOUS BLD VENIPUNCTURE: CPT

## 2024-02-13 PROCEDURE — 80053 COMPREHEN METABOLIC PANEL: CPT

## 2024-02-13 PROCEDURE — 99239 HOSP IP/OBS DSCHRG MGMT >30: CPT | Mod: GC

## 2024-02-13 PROCEDURE — 71260 CT THORAX DX C+: CPT | Mod: MA

## 2024-02-13 PROCEDURE — 87186 SC STD MICRODIL/AGAR DIL: CPT

## 2024-02-13 PROCEDURE — 82550 ASSAY OF CK (CPK): CPT

## 2024-02-13 PROCEDURE — 85025 COMPLETE CBC W/AUTO DIFF WBC: CPT

## 2024-02-13 PROCEDURE — 99285 EMERGENCY DEPT VISIT HI MDM: CPT | Mod: 25

## 2024-02-13 PROCEDURE — 97116 GAIT TRAINING THERAPY: CPT

## 2024-02-13 PROCEDURE — 85027 COMPLETE CBC AUTOMATED: CPT

## 2024-02-13 PROCEDURE — 97162 PT EVAL MOD COMPLEX 30 MIN: CPT

## 2024-02-13 PROCEDURE — 71045 X-RAY EXAM CHEST 1 VIEW: CPT

## 2024-02-13 PROCEDURE — 70450 CT HEAD/BRAIN W/O DYE: CPT | Mod: MA

## 2024-02-13 PROCEDURE — 80048 BASIC METABOLIC PNL TOTAL CA: CPT

## 2024-02-13 PROCEDURE — 81001 URINALYSIS AUTO W/SCOPE: CPT

## 2024-02-13 PROCEDURE — 87086 URINE CULTURE/COLONY COUNT: CPT

## 2024-02-13 PROCEDURE — 97110 THERAPEUTIC EXERCISES: CPT

## 2024-02-13 PROCEDURE — 51702 INSERT TEMP BLADDER CATH: CPT

## 2024-02-13 PROCEDURE — 87077 CULTURE AEROBIC IDENTIFY: CPT

## 2024-02-13 PROCEDURE — 72125 CT NECK SPINE W/O DYE: CPT | Mod: MA

## 2024-02-13 RX ORDER — ASCORBIC ACID 60 MG
1 TABLET,CHEWABLE ORAL
Qty: 0 | Refills: 0 | DISCHARGE
Start: 2024-02-13

## 2024-02-13 RX ORDER — ENOXAPARIN SODIUM 100 MG/ML
40 INJECTION SUBCUTANEOUS
Qty: 0 | Refills: 0 | DISCHARGE
Start: 2024-02-13

## 2024-02-13 RX ORDER — BENZOCAINE AND MENTHOL 5; 1 G/100ML; G/100ML
1 LIQUID ORAL ONCE
Refills: 0 | Status: COMPLETED | OUTPATIENT
Start: 2024-02-13 | End: 2024-02-13

## 2024-02-13 RX ORDER — CEFUROXIME AXETIL 250 MG
1 TABLET ORAL
Qty: 0 | Refills: 0 | DISCHARGE
Start: 2024-02-13

## 2024-02-13 RX ADMIN — FINASTERIDE 5 MILLIGRAM(S): 5 TABLET, FILM COATED ORAL at 14:24

## 2024-02-13 RX ADMIN — PANTOPRAZOLE SODIUM 40 MILLIGRAM(S): 20 TABLET, DELAYED RELEASE ORAL at 05:21

## 2024-02-13 RX ADMIN — Medication 400 MILLIGRAM(S): at 05:17

## 2024-02-13 RX ADMIN — ATENOLOL 25 MILLIGRAM(S): 25 TABLET ORAL at 05:14

## 2024-02-13 RX ADMIN — ENOXAPARIN SODIUM 40 MILLIGRAM(S): 100 INJECTION SUBCUTANEOUS at 05:15

## 2024-02-13 RX ADMIN — Medication 500 MILLIGRAM(S): at 05:15

## 2024-02-13 RX ADMIN — Medication 112 MICROGRAM(S): at 05:14

## 2024-02-13 RX ADMIN — Medication 400 MILLIGRAM(S): at 14:24

## 2024-02-13 RX ADMIN — Medication 300 MILLIGRAM(S): at 14:24

## 2024-02-13 RX ADMIN — BENZOCAINE AND MENTHOL 1 LOZENGE: 5; 1 LIQUID ORAL at 08:31

## 2024-02-13 RX ADMIN — AMLODIPINE BESYLATE 5 MILLIGRAM(S): 2.5 TABLET ORAL at 05:14

## 2024-02-13 NOTE — PROGRESS NOTE ADULT - ASSESSMENT
Patient is a 89 year old male with PMH of HTN, HLD, spinal stenosis, BPH, Gout, hypothyroidism, insomnia, and chronic indwelling stewart catheter BIBEMS presents to ED after a fall.     UTI in setting of indwelling stewart catheter   Leukocytosis may be d/t above v reactive to rhabdomyolysis and fall   - stewart exchanged in the ER - noted urine cloudy in stewart tubing so will give short course    - CT imaging with findings s/o infectious v inflammatory cystitis; fecal obstipation   - + UA with Ucx pending GNR   -- prior 2/1 cx P. mirabilis, noted   - CT chest with no pneumonia or mass    - s/p ceftriaxone once in the ER     Recommendations:  Continue on ceftriaxone 1g IV Q24h - complete total 5d course until 2/14  On discharge, can transition to cefuroxime 500mg PO BID   Continue rest of care per primary team     Infectious Diseases will continue to follow. Please call with any questions.   Shahida Guzman M.D.  [facility] Division of Infectious Diseases [phone]  For after 5 P.M. and weekends, please call [phone]    
Patient is a 89 year old male with PMH of HTN, HLD, spinal stenosis, BPH, Gout, hypothyroidism, insomnia, and chronic indwelling stewart catheter BIBEMS presents to ED after a fall.     UTI in setting of indwelling stewart catheter   Leukocytosis may be d/t above v reactive to rhabdomyolysis and fall   - stewart exchanged in the ER - noted urine cloudy in stewart tubing so will give short course    - CT imaging with findings s/o infectious v inflammatory cystitis; fecal obstipation   - + UA with Ucx pending GNR   -- prior 2/1 cx P. mirabilis, noted   - CT chest with no pneumonia or mass    - s/p ceftriaxone once in the ER     Recommendations:  can transition to cefuroxime 500mg PO BID, last day 2/14  Additional care per primary team    Infectious Diseases will continue to follow. Please call with any questions.   Shahida Guzman M.D.  [facility] Division of Infectious Diseases [phone]  For after 5 P.M. and weekends, please call [phone]    
89 yo M with PMH HTN, HLD, spinal stenosis, BPH, Gout, hypothyroidism, insomnia, and chronic indwelling stewart catheter BIBEMS presents to ED after a fall. Admit for Rhabdomyolysis 2/2 fall and + urine culture. 
89 yo M with PMH HTN, HLD, spinal stenosis, BPH, Gout, hypothyroidism, insomnia, and chronic indwelling stewart catheter BIBEMS presents to ED after a fall. Admit for Rhabdomyolysis 2/2 fall and + urine culture.

## 2024-02-13 NOTE — CHART NOTE - NSCHARTNOTEFT_GEN_A_CORE
Patient was seen on day of discharge. He reports no new complaints. States that he is upset that he has not been walked every day by Physical Therapy. Otherwise, he feels well but still weak. States that he is getting weaker by staying in bed while waiting for [facility] placement. I told him that I would speak to PT/SW - has been accepted to [facility] with a 2PM . Patient is medically stable for transition in care to subacute rehabilitation facility.    Please see updated discharge note for further details.

## 2024-02-13 NOTE — DISCHARGE NOTE NURSING/CASE MANAGEMENT/SOCIAL WORK - NSDCPEFALRISK_GEN_ALL_CORE
For information on Fall & Injury Prevention, visit: [email or web address] OR  [email or web address] OR  https://www.cdc.gov/steadi/patient.html

## 2024-02-13 NOTE — SOCIAL WORK PROGRESS NOTE - NSSWPROGRESSNOTE_GEN_ALL_CORE
TEDDY spoke with this pt and his son- pt for DC today, Auth obtained for Britany from [facility]- auth #[id number] Level I 2/12-2/14 Sheri YEPEZ follow up . Ana pugh arranged for 2pm , pts son maverick to call with CC information. Pt, family, team in agreement with dc. TEDDY remains available.

## 2024-02-13 NOTE — DISCHARGE NOTE NURSING/CASE MANAGEMENT/SOCIAL WORK - PATIENT PORTAL LINK FT
You can access the FollowMyHealth Patient Portal offered by [facility] by registering at the following website: [email or web address]. By joining [employer]’s FollowMyHealth portal, you will also be able to view your health information using other applications (apps) compatible with our system.

## 2024-02-13 NOTE — PROGRESS NOTE ADULT - SUBJECTIVE AND OBJECTIVE BOX
[facility], Division of Infectious Diseases  ANH Mckinney Y. Patel, S. Shah, G. [facility]  [phone]    Name: JOIE DIAZ  Age: 89y  Gender: Male  MRN: [id number]    Interval History:  Patient seen this AM  No acute overnight events.   Notes reviewed    Antibiotics:  cefuroxime   Tablet 500 milliGRAM(s) Oral every 12 hours      Medications:  acetaminophen     Tablet .. 650 milliGRAM(s) Oral every 6 hours PRN  allopurinol 300 milliGRAM(s) Oral daily  ALPRAZolam 0.5 milliGRAM(s) Oral at bedtime  aluminum hydroxide/magnesium hydroxide/simethicone Suspension 30 milliLiter(s) Oral every 4 hours PRN  amLODIPine   Tablet 5 milliGRAM(s) Oral daily  amLODIPine   Tablet 2.5 milliGRAM(s) Oral at bedtime  atenolol  Tablet 25 milliGRAM(s) Oral daily  cefuroxime   Tablet 500 milliGRAM(s) Oral every 12 hours  enoxaparin Injectable 40 milliGRAM(s) SubCutaneous every 24 hours  finasteride 5 milliGRAM(s) Oral daily  levothyroxine 112 MICROGram(s) Oral daily  melatonin 3 milliGRAM(s) Oral at bedtime PRN  mirtazapine 15 milliGRAM(s) Oral at bedtime  mupirocin 2% Ointment 1 Application(s) Topical two times a day  ondansetron Injectable 4 milliGRAM(s) IV Push every 8 hours PRN  pantoprazole    Tablet 40 milliGRAM(s) Oral before breakfast  pentoxifylline 400 milliGRAM(s) Oral three times a day  polyethylene glycol 3350 17 Gram(s) Oral daily PRN  simvastatin 5 milliGRAM(s) Oral at bedtime  sodium chloride 0.9%. 1000 milliLiter(s) IV Continuous <Continuous>  sodium chloride 0.9%. 1000 milliLiter(s) IV Continuous <Continuous>      Review of Systems:  A 10-point review of systems was obtained.   Review of systems otherwise negative except as previously noted.    Allergies: Dilantin (Unknown)    For details regarding the patient's past medical history, social history, family history, and other miscellaneous elements, please refer the initial infectious diseases consultation and/or the admitting history and physical examination for this admission.    Objective:  Vitals:   T(C): 36.8 (02-13-24 @ 05:13), Max: 36.8 (02-13-24 @ 05:13)  HR: 65 (02-13-24 @ 05:13) (65 - 70)  BP: 133/70 (02-13-24 @ 05:13) (109/58 - 133/70)  RR: 17 (02-13-24 @ 05:13) (17 - 17)  SpO2: 96% (02-13-24 @ 05:13) (93% - 96%)    Physical Examination:  General: no acute distress  HEENT: NC/AT, EOMI,  Cardio: RRR  Resp: decreased breath sounds  Abd: soft, NT, ND,  Ext: no edema or cyanosis  Skin: warm, dry, no visible rash      Laboratory Studies:  CBC:                       11.2   7.59  )-----------( 181      ( 13 Feb 2024 09:00 )             34.5     CMP: 02-13    135  |  103  |  16  ----------------------------<  133<H>  3.8   |  28  |  0.85    Ca    8.7      13 Feb 2024 09:00    TPro  5.9<L>  /  Alb  2.4<L>  /  TBili  0.4  /  DBili  x   /  AST  57<H>  /  ALT  48  /  AlkPhos  96  02-13    LIVER FUNCTIONS - ( 13 Feb 2024 09:00 )  Alb: 2.4 g/dL / Pro: 5.9 g/dL / ALK PHOS: 96 U/L / ALT: 48 U/L / AST: 57 U/L / GGT: x           Urinalysis Basic - ( 13 Feb 2024 09:00 )    Color: x / Appearance: x / SG: x / pH: x  Gluc: 133 mg/dL / Ketone: x  / Bili: x / Urobili: x   Blood: x / Protein: x / Nitrite: x   Leuk Esterase: x / RBC: x / WBC x   Sq Epi: x / Non Sq Epi: x / Bacteria: x        Microbiology: reviewed    Culture - Urine (collected 02-10-24 @ 16:15)  Source: Catheterized Catheterized  Preliminary Report (02-12-24 @ 00:24):    >100,000 CFU/ml Gram Negative Rods    Culture - Urine (collected 02-01-24 @ 18:16)  Source: Clean Catch Clean Catch (Midstream)  Final Report (02-04-24 @ 14:12):    >100,000 CFU/ml Proteus mirabilis    <10,000 CFU/ml Normal Urogenital sona present  Organism: Proteus mirabilis (02-04-24 @ 14:12)  Organism: Proteus mirabilis (02-04-24 @ 14:12)      Method Type: ELY      -  Amoxicillin/Clavulanic Acid: S <=8/4      -  Ampicillin: R >16 These ampicillin results predict results for amoxicillin      -  Ampicillin/Sulbactam: S <=4/2      -  Aztreonam: S <=4      -  Cefazolin: S 16 For uncomplicated UTI with K. pneumoniae, E. coli, or P. mirablis: ELY <=16 is sensitive and ELY >=32 is resistant. This also predicts results for oral agents cefaclor, cefdinir, cefpodoxime, cefprozil, cefuroxime axetil, cephalexin and locarbeffor uncomplicated UTI. Note that some isolates may be susceptible to these agents while testing resistant to cefazolin.      -  Cefepime: S <=2      -  Cefoxitin: S <=8      -  Ceftriaxone: S <=1      -  Cefuroxime: S <=4      -  Ciprofloxacin: S <=0.25      -  Ertapenem: S <=0.5      -  Gentamicin: S <=2      -  Levofloxacin: S <=0.5      -  Meropenem: S <=1      -  Nitrofurantoin: R 64 Should not be used to treat pyelonephritis      -  Piperacillin/Tazobactam: S <=8      -  Tobramycin: S <=2      -  Trimethoprim/Sulfamethoxazole: R >2/38          Radiology: reviewed      
[facility], Division of Infectious Diseases  ANH Mckinney Y. Patel, S. Shah, G. [facility]  [phone]    Name: JOIE DIAZ  Age: 89y  Gender: Male  MRN: [id number]    Interval History:  Patient seen and examined at bedside  No acute overnight events. Afebrile  No complaints  Notes reviewed    Antibiotics:      Medications:  acetaminophen     Tablet .. 650 milliGRAM(s) Oral every 6 hours PRN  allopurinol 300 milliGRAM(s) Oral daily  ALPRAZolam 0.5 milliGRAM(s) Oral at bedtime  aluminum hydroxide/magnesium hydroxide/simethicone Suspension 30 milliLiter(s) Oral every 4 hours PRN  amLODIPine   Tablet 5 milliGRAM(s) Oral daily  amLODIPine   Tablet 2.5 milliGRAM(s) Oral at bedtime  atenolol  Tablet 25 milliGRAM(s) Oral daily  enoxaparin Injectable 40 milliGRAM(s) SubCutaneous every 24 hours  finasteride 5 milliGRAM(s) Oral daily  levothyroxine 112 MICROGram(s) Oral daily  melatonin 3 milliGRAM(s) Oral at bedtime PRN  mirtazapine 15 milliGRAM(s) Oral at bedtime  mupirocin 2% Ointment 1 Application(s) Topical two times a day  ondansetron Injectable 4 milliGRAM(s) IV Push every 8 hours PRN  pantoprazole    Tablet 40 milliGRAM(s) Oral before breakfast  pentoxifylline 400 milliGRAM(s) Oral three times a day  polyethylene glycol 3350 17 Gram(s) Oral daily PRN  simvastatin 5 milliGRAM(s) Oral at bedtime  sodium chloride 0.9%. 1000 milliLiter(s) IV Continuous <Continuous>  sodium chloride 0.9%. 1000 milliLiter(s) IV Continuous <Continuous>      Review of Systems:  Review of systems otherwise negative except as previously noted.    Allergies: Dilantin (Unknown)    For details regarding the patient's past medical history, social history, family history, and other miscellaneous elements, please refer the initial infectious diseases consultation and/or the admitting history and physical examination for this admission.    Objective:  Vitals:   T(C): 36.4 (02-12-24 @ 11:38), Max: 37 (02-11-24 @ 20:04)  HR: 66 (02-12-24 @ 11:38) (64 - 66)  BP: 120/68 (02-12-24 @ 11:38) (120/68 - 144/67)  RR: 17 (02-12-24 @ 11:38) (17 - 17)  SpO2: 96% (02-12-24 @ 11:38) (96% - 97%)    Physical Examination:  General: no acute distress  HEENT: NC/AT, EOMI,  Cardio: RRR  Resp: decreased breath sounds  Abd: soft, NT, ND  Ext: no edema or cyanosis  Skin: warm, dry, no visible rash      Laboratory Studies:  CBC:                       10.5   7.91  )-----------( 181      ( 12 Feb 2024 08:00 )             31.1     CMP: 02-12    135  |  102  |  19  ----------------------------<  117<H>  3.8   |  27  |  0.83    Ca    8.1<L>      12 Feb 2024 08:00    TPro  5.6<L>  /  Alb  2.3<L>  /  TBili  0.3  /  DBili  x   /  AST  46<H>  /  ALT  30  /  AlkPhos  99  02-12    LIVER FUNCTIONS - ( 12 Feb 2024 08:00 )  Alb: 2.3 g/dL / Pro: 5.6 g/dL / ALK PHOS: 99 U/L / ALT: 30 U/L / AST: 46 U/L / GGT: x           Urinalysis Basic - ( 12 Feb 2024 08:00 )    Color: x / Appearance: x / SG: x / pH: x  Gluc: 117 mg/dL / Ketone: x  / Bili: x / Urobili: x   Blood: x / Protein: x / Nitrite: x   Leuk Esterase: x / RBC: x / WBC x   Sq Epi: x / Non Sq Epi: x / Bacteria: x        Microbiology: reviewed    Culture - Urine (collected 02-10-24 @ 16:15)  Source: Catheterized Catheterized  Preliminary Report (02-12-24 @ 00:24):    >100,000 CFU/ml Gram Negative Rods    Culture - Urine (collected 02-01-24 @ 18:16)  Source: Clean Catch Clean Catch (Midstream)  Final Report (02-04-24 @ 14:12):    >100,000 CFU/ml Proteus mirabilis    <10,000 CFU/ml Normal Urogenital sona present  Organism: Proteus mirabilis (02-04-24 @ 14:12)  Organism: Proteus mirabilis (02-04-24 @ 14:12)      Method Type: ELY      -  Amoxicillin/Clavulanic Acid: S <=8/4      -  Ampicillin: R >16 These ampicillin results predict results for amoxicillin      -  Ampicillin/Sulbactam: S <=4/2      -  Aztreonam: S <=4      -  Cefazolin: S 16 For uncomplicated UTI with K. pneumoniae, E. coli, or P. mirablis: ELY <=16 is sensitive and ELY >=32 is resistant. This also predicts results for oral agents cefaclor, cefdinir, cefpodoxime, cefprozil, cefuroxime axetil, cephalexin and locarbeffor uncomplicated UTI. Note that some isolates may be susceptible to these agents while testing resistant to cefazolin.      -  Cefepime: S <=2      -  Cefoxitin: S <=8      -  Ceftriaxone: S <=1      -  Cefuroxime: S <=4      -  Ciprofloxacin: S <=0.25      -  Ertapenem: S <=0.5      -  Gentamicin: S <=2      -  Levofloxacin: S <=0.5      -  Meropenem: S <=1      -  Nitrofurantoin: R 64 Should not be used to treat pyelonephritis      -  Piperacillin/Tazobactam: S <=8      -  Tobramycin: S <=2      -  Trimethoprim/Sulfamethoxazole: R >2/38          Radiology: reviewed      
Patient is a 89y old  Male who presents with a chief complaint of Rhabdomyolysis     (12 Feb 2024 10:57)      INTERVAL HPI/OVERNIGHT EVENTS:  No acute events overnight.  Patient laying comfortably in bed  Patient would like to be seen by PT again as he would like to walk around  No complaints of pain.    MEDICATIONS  (STANDING):  allopurinol 300 milliGRAM(s) Oral daily  ALPRAZolam 0.5 milliGRAM(s) Oral at bedtime  amLODIPine   Tablet 5 milliGRAM(s) Oral daily  amLODIPine   Tablet 2.5 milliGRAM(s) Oral at bedtime  atenolol  Tablet 25 milliGRAM(s) Oral daily  enoxaparin Injectable 40 milliGRAM(s) SubCutaneous every 24 hours  finasteride 5 milliGRAM(s) Oral daily  levothyroxine 112 MICROGram(s) Oral daily  mirtazapine 15 milliGRAM(s) Oral at bedtime  mupirocin 2% Ointment 1 Application(s) Topical two times a day  pantoprazole    Tablet 40 milliGRAM(s) Oral before breakfast  pentoxifylline 400 milliGRAM(s) Oral three times a day  simvastatin 5 milliGRAM(s) Oral at bedtime  sodium chloride 0.9%. 1000 milliLiter(s) (50 mL/Hr) IV Continuous <Continuous>  sodium chloride 0.9%. 1000 milliLiter(s) (50 mL/Hr) IV Continuous <Continuous>    MEDICATIONS  (PRN):  acetaminophen     Tablet .. 650 milliGRAM(s) Oral every 6 hours PRN Temp greater or equal to 38C (100.4F), Mild Pain (1 - 3)  aluminum hydroxide/magnesium hydroxide/simethicone Suspension 30 milliLiter(s) Oral every 4 hours PRN Dyspepsia  melatonin 3 milliGRAM(s) Oral at bedtime PRN Insomnia  ondansetron Injectable 4 milliGRAM(s) IV Push every 8 hours PRN Nausea and/or Vomiting  polyethylene glycol 3350 17 Gram(s) Oral daily PRN Constipation      Allergies    Dilantin (Unknown)    Intolerances        REVIEW OF SYSTEMS:  CONSTITUTIONAL: No fever or chills  HEENT:  No headache, no sore throat  RESPIRATORY: No cough, wheezing, or shortness of breath  CARDIOVASCULAR: No chest pain, palpitations  GASTROINTESTINAL: No abd pain, nausea, vomiting, or diarrhea  GENITOURINARY: No dysuria, frequency, or hematuria  NEUROLOGICAL: no focal weakness or dizziness  MUSCULOSKELETAL: no myalgias       Vital Signs Last 24 Hrs  T(C): 36.4 (12 Feb 2024 11:38), Max: 37 (11 Feb 2024 20:04)  T(F): 97.5 (12 Feb 2024 11:38), Max: 98.6 (11 Feb 2024 20:04)  HR: 66 (12 Feb 2024 11:38) (57 - 66)  BP: 120/68 (12 Feb 2024 11:38) (120/68 - 144/67)  BP(mean): --  RR: 17 (12 Feb 2024 11:38) (17 - 17)  SpO2: 96% (12 Feb 2024 11:38) (96% - 100%)    Parameters below as of 12 Feb 2024 11:38  Patient On (Oxygen Delivery Method): room air        PHYSICAL EXAM:  GENERAL: NAD  HEENT:  anicteric, moist mucous membranes  CHEST/LUNG:  CTA b/l, no rales, wheezes, or rhonchi  HEART:  RRR, S1, S2  ABDOMEN:  soft, nontender, nondistended  MUSCULOSKELETAL: no edema, cyanosis, or calf tenderness  NEUROLOGIC: answers questions and follows commands appropriately, AOx3      LABS:                        10.5   7.91  )-----------( 181      ( 12 Feb 2024 08:00 )             31.1     CBC Full  -  ( 12 Feb 2024 08:00 )  WBC Count : 7.91 K/uL  Hemoglobin : 10.5 g/dL  Hematocrit : 31.1 %  Platelet Count - Automated : 181 K/uL  Mean Cell Volume : 87.6 fl  Mean Cell Hemoglobin : 29.6 pg  Mean Cell Hemoglobin Concentration : 33.8 gm/dL  Auto Neutrophil # : 5.85 K/uL  Auto Lymphocyte # : 1.06 K/uL  Auto Monocyte # : 0.78 K/uL  Auto Eosinophil # : 0.12 K/uL  Auto Basophil # : 0.04 K/uL  Auto Neutrophil % : 73.9 %  Auto Lymphocyte % : 13.4 %  Auto Monocyte % : 9.9 %  Auto Eosinophil % : 1.5 %  Auto Basophil % : 0.5 %    12 Feb 2024 08:00    135    |  102    |  19     ----------------------------<  117    3.8     |  27     |  0.83     Ca    8.1        12 Feb 2024 08:00    TPro  5.6    /  Alb  2.3    /  TBili  0.3    /  DBili  x      /  AST  46     /  ALT  30     /  AlkPhos  99     12 Feb 2024 08:00      Urinalysis Basic - ( 12 Feb 2024 08:00 )    Color: x / Appearance: x / SG: x / pH: x  Gluc: 117 mg/dL / Ketone: x  / Bili: x / Urobili: x   Blood: x / Protein: x / Nitrite: x   Leuk Esterase: x / RBC: x / WBC x   Sq Epi: x / Non Sq Epi: x / Bacteria: x      CAPILLARY BLOOD GLUCOSE            Culture - Urine (collected 02-10-24 @ 16:15)  Source: Catheterized Catheterized  Preliminary Report (02-12-24 @ 00:24):    >100,000 CFU/ml Gram Negative Rods        RADIOLOGY & ADDITIONAL TESTS:    Personally reviewed.     Consultant(s) Notes Reviewed:  [x] YES  [ ] NO    
Patient is a 89y old  Male who presents with a chief complaint of Fall (10 Feb 2024 16:42)      Subjective:  INTERVAL HPI/OVERNIGHT EVENTS: Patient seen and examined at bedside. No overnight events occurred. Patient states that he is feeling well. He wants to be discharged.     MEDICATIONS  (STANDING):  allopurinol 300 milliGRAM(s) Oral daily  ALPRAZolam 0.5 milliGRAM(s) Oral at bedtime  amLODIPine   Tablet 2.5 milliGRAM(s) Oral at bedtime  atenolol  Tablet 25 milliGRAM(s) Oral daily  cefTRIAXone   IVPB 1000 milliGRAM(s) IV Intermittent every 24 hours  enoxaparin Injectable 40 milliGRAM(s) SubCutaneous every 24 hours  finasteride 5 milliGRAM(s) Oral daily  levothyroxine 112 MICROGram(s) Oral daily  mirtazapine 15 milliGRAM(s) Oral at bedtime  pantoprazole    Tablet 40 milliGRAM(s) Oral before breakfast  pentoxifylline 400 milliGRAM(s) Oral three times a day  simvastatin 5 milliGRAM(s) Oral at bedtime  sodium chloride 0.9%. 1000 milliLiter(s) (50 mL/Hr) IV Continuous <Continuous>    MEDICATIONS  (PRN):  acetaminophen     Tablet .. 650 milliGRAM(s) Oral every 6 hours PRN Temp greater or equal to 38C (100.4F), Mild Pain (1 - 3)  aluminum hydroxide/magnesium hydroxide/simethicone Suspension 30 milliLiter(s) Oral every 4 hours PRN Dyspepsia  melatonin 3 milliGRAM(s) Oral at bedtime PRN Insomnia  ondansetron Injectable 4 milliGRAM(s) IV Push every 8 hours PRN Nausea and/or Vomiting  polyethylene glycol 3350 17 Gram(s) Oral daily PRN Constipation      Allergies    Dilantin (Unknown)    Intolerances        REVIEW OF SYSTEMS:  CONSTITUTIONAL: No fever or chills  RESPIRATORY: No cough, wheezing, or shortness of breath  CARDIOVASCULAR: No chest pain, palpitations  GASTROINTESTINAL: No abd pain, nausea, vomiting  GENITOURINARY: +chronic stewart  NEUROLOGICAL: no focal weakness or dizziness  MUSCULOSKELETAL: no myalgias     Objective:  Vital Signs Last 24 Hrs  T(C): 36.5 (2024 04:45), Max: 36.5 (2024 04:45)  T(F): 97.7 (2024 04:45), Max: 97.7 (2024 04:45)  HR: 62 (2024 04:45) (62 - 77)  BP: 113/61 (2024 04:45) (113/61 - 150/78)  BP(mean): --  RR: 17 (2024 04:45) (16 - 18)  SpO2: 97% (2024 04:45) (96% - 100%)    Parameters below as of 2024 04:45  Patient On (Oxygen Delivery Method): room air      CONSTITUTIONAL: Well groomed, frail, NAD  EYES:  EOMI, No conjunctival or scleral injection, non-icteric  ENMT: dry mucous membranes   NECK: Supple, symmetric and without tracheal deviation   RESP: No respiratory distress, no use of accessory muscles; CTA b/l, no WRR  CV: RRR, +S1S2, no MRG; no edema.   GI: Soft, NT, ND, no rebound, no guarding  MSK: No digital clubbing or cyanosis; Normal ROM without pain, no spinal tenderness, normal muscle strength/tone  SKIN: Small abrasions noted on B/L shoulders, erythema of B/L elbows with abrasions; R forehead laceration- dry- no active bleeding  NEURO: no focal deficits   PSYCH: Appropriate insight/judgment; A+O x 3, mood and affect appropriate      LABS:                        11.1   10.01 )-----------( 186      ( 2024 08:45 )             32.5     CBC Full  -  ( 2024 08:45 )  WBC Count : 10.01 K/uL  Hemoglobin : 11.1 g/dL  Hematocrit : 32.5 %  Platelet Count - Automated : 186 K/uL  Mean Cell Volume : 86.4 fl  Mean Cell Hemoglobin : 29.5 pg  Mean Cell Hemoglobin Concentration : 34.2 gm/dL  Auto Neutrophil # : x  Auto Lymphocyte # : x  Auto Monocyte # : x  Auto Eosinophil # : x  Auto Basophil # : x  Auto Neutrophil % : x  Auto Lymphocyte % : x  Auto Monocyte % : x  Auto Eosinophil % : x  Auto Basophil % : x    10 2024 14:20    130    |  93     |  35     ----------------------------<  106    3.9     |  26     |  1.10     Ca    9.4        10 Feb 2024 14:20    TPro  7.0    /  Alb  3.1    /  TBili  1.1    /  DBili  x      /  AST  119    /  ALT  43     /  AlkPhos  134    10 Feb 2024 14:20      Urinalysis Basic - ( 10 Feb 2024 16:15 )    Color: Yellow / Appearance: Clear / S.023 / pH: x  Gluc: x / Ketone: 15 mg/dL  / Bili: Negative / Urobili: 0.2 mg/dL   Blood: x / Protein: 30 mg/dL / Nitrite: Negative   Leuk Esterase: Moderate / RBC: 5 /HPF / WBC 4 /HPF   Sq Epi: x / Non Sq Epi: x / Bacteria: Few /HPF      CAPILLARY BLOOD GLUCOSE              RADIOLOGY & ADDITIONAL TESTS:    Personally reviewed.     Consultant(s) Notes Reviewed:  [x] YES  [ ] NO

## 2024-03-28 ENCOUNTER — EMERGENCY (EMERGENCY)
Facility: HOSPITAL | Age: 89
LOS: 1 days | Discharge: HOME | End: 2024-03-28
Attending: STUDENT IN AN ORGANIZED HEALTH CARE EDUCATION/TRAINING PROGRAM | Admitting: EMERGENCY MEDICINE
Payer: MEDICARE

## 2024-03-28 VITALS
OXYGEN SATURATION: 99 % | HEIGHT: 66 IN | DIASTOLIC BLOOD PRESSURE: 62 MMHG | SYSTOLIC BLOOD PRESSURE: 113 MMHG | RESPIRATION RATE: 18 BRPM | TEMPERATURE: 97 F | HEART RATE: 92 BPM | WEIGHT: 139.99 LBS

## 2024-03-28 DIAGNOSIS — Z98.890 OTHER SPECIFIED POSTPROCEDURAL STATES: Chronic | ICD-10-CM

## 2024-03-28 DIAGNOSIS — Z90.49 ACQUIRED ABSENCE OF OTHER SPECIFIED PARTS OF DIGESTIVE TRACT: Chronic | ICD-10-CM

## 2024-03-28 PROCEDURE — 99282 EMERGENCY DEPT VISIT SF MDM: CPT

## 2024-03-28 PROCEDURE — 99283 EMERGENCY DEPT VISIT LOW MDM: CPT

## 2024-03-28 NOTE — ED ADULT TRIAGE NOTE - CHIEF COMPLAINT QUOTE
Pt has had stewart catheter since February 2023, catheter changed in urologist office earlier today, Pt noted to have blood in urine and states blood around penile meatus.  Denies any pain but does have some burning around meatus.  Pt is still having output in stewart bag.

## 2024-03-29 PROBLEM — E03.9 HYPOTHYROIDISM, UNSPECIFIED: Chronic | Status: ACTIVE | Noted: 2024-02-10

## 2024-03-29 PROBLEM — M48.00 SPINAL STENOSIS, SITE UNSPECIFIED: Chronic | Status: ACTIVE | Noted: 2024-02-10

## 2024-03-29 PROBLEM — G47.00 INSOMNIA, UNSPECIFIED: Chronic | Status: ACTIVE | Noted: 2024-02-10

## 2024-03-29 NOTE — ED ADULT NURSE NOTE - NSFALLLASTSIX_ED_ALL_ED
No. Render In Bullet Format When Appropriate: No Consent: The patient's consent was obtained including but not limited to risks of crusting, scabbing, blistering, scarring, darker or lighter pigmentary change, recurrence, incomplete removal and infection. Total Number Of Aks Treated: 3 Detail Level: Zone Post-Care Instructions: I reviewed with the patient in detail post-care instructions. Patient is to wear sunprotection, and avoid picking at any of the treated lesions. Pt may apply Vaseline to crusted or scabbing areas. Duration Of Freeze Thaw-Cycle (Seconds): 0 denies

## 2024-03-29 NOTE — ED PROVIDER NOTE - PATIENT PORTAL LINK FT
You can access the FollowMyHealth Patient Portal offered by [facility] by registering at the following website: [email or web address]. By joining [employer]’s FollowMyHealth portal, you will also be able to view your health information using other applications (apps) compatible with our system.

## 2024-03-29 NOTE — ED PROVIDER NOTE - PHYSICAL EXAMINATION
Vital signs as available reviewed.  General:  No acute distress.  Head:  Normocephalic, atraumatic.  Eyes:  Conjunctiva pink, no icterus.  Abdomen:  Soft, non-tender.  : no blood at meatus. juvenal blood with scant small clots in leg bag.  Musculoskeletal:  No obvious deformity.  Neurologic: Alert and oriented, moving all extremities.  Skin:  Warm and dry.

## 2024-03-29 NOTE — ED PROVIDER NOTE - CLINICAL SUMMARY MEDICAL DECISION MAKING FREE TEXT BOX
89 M chronic indwelling stewart catheter here due to bleeding after stewart change today. Stewart flushing easily with good return of pink tinged sterile water with scant small blood clots. will observer for return of hematuria.

## 2024-03-29 NOTE — ED PROVIDER NOTE - PROGRESS NOTE DETAILS
Pt signed out to me by Dr. Hinojosa to reassess for recurrent hematuria  Neumann draining clear now  Stable for discharge

## 2024-03-29 NOTE — ED PROVIDER NOTE - CARE PROVIDER_API CALL
Jozef Jose Phoenix  Urology  [address], [address]  [place] [address]  Phone: [phone]  Fax: [phone]  Follow Up Time: 1-3 Days

## 2024-03-29 NOTE — ED ADULT NURSE NOTE - NSFALLRISKASMT_ED_ALL_ED_DT
1/11/2023    Patient: Deshawn Hinton   YOB: 1981   Date of Visit: 1/11/2023     Marina Scheuermann, NP  [address] [id number]  [facility]ia [facility], [address] [place] [address]  Via     Dear Marina Scheuermann, NP Marina Scheuermann, RN,      Thank you for referring Mr. Deshawn Hinton to [address] [place] [address] for evaluation. My notes for this consultation are attached. If you have questions, please do not hesitate to call me. I look forward to following your patient along with you.       Sincerely,    Foster Briones, DO 29-Mar-2024 00:26

## 2024-03-29 NOTE — ED PROVIDER NOTE - NSFOLLOWUPINSTRUCTIONS_ED_ALL_ED_FT
Please follow up with your Primary Care Physician and any specialists as discussed- follow up with your urologist or with the on call Urologist.  Please take your medications as prescribed and or instructed.  If your symptoms persist or worsen, please seek care. Either return to the Emergency Department, go to urgent care or see your primary care doctor.  Please refer to general information and instructions attached or below:     Hematuria    WHAT YOU NEED TO KNOW:    Hematuria is blood in your urine. Your urine may be bright red to dark brown.    DISCHARGE INSTRUCTIONS:    Return to the emergency department if:     You have blood in your urine after a new injury, such as a fall.      You have severe back or side pain that does not go away with treatment.    Call your doctor if:     You are urinating very small amounts or not at all.      You feel like you cannot empty your bladder.      You have a fever that gets worse or does not go away with treatment.      You cannot keep liquids or medicines down.      Your urine gets darker, even after you drink extra liquids.      You have questions or concerns about your condition, treatment, or care.    Drink liquids as directed: You may need to drink extra liquids to help flush the blood from your body through your urine. Water is the best liquid to drink. Ask how much liquid to drink each day and which liquids are best for you.    Follow up with your doctor as directed: Write down your questions so you remember to ask them during your visits.

## 2024-03-29 NOTE — ED ADULT NURSE NOTE - OBJECTIVE STATEMENT
Patient with hx neurogenic bladder with chronic stewart. Its changed every 4 weeks. After being changed yesterday noted hematuria, clots and redness at the meatus. Endorses mild pain. No acute distress noted.

## 2024-03-29 NOTE — ED PROVIDER NOTE - OBJECTIVE STATEMENT
89-year-old male history HTN, HLD, spinal stenosis, BPH, Gout, hypothyroidism, insomnia, and chronic indwelling stewart catheter here with son due to blood in Stewart and at meatus.  Patient with spinal stenosis which led to neurogenic bladder which is treated with indwelling Stewart.  [facility]  Some blood noted after procedure.  Patient noted more blood in leg bag as well as around meatus at home.  No abdominal pain.  Patient has mild irritation at meatus which is normal for him when he has his catheter changed. Patient not on blood thinners, takes 81 mg aspirin daily.

## 2024-03-29 NOTE — ED PROVIDER NOTE - WET READ LAUNCH FT
Patient:   LOLA MONTANA            MRN: [id number]            FIN: [id number]              Age:   63 years     Sex:  FEMALE     :  55   Associated Diagnoses:   None   Author:   JUAN, SAMM     Gastroenterology Consult note     Chief Complaint   1. Hematochezia  2. 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 Lira. GI consulted for rectal bleeding. Reports episodes of hematochezia yesterday with passage of blood clots, with BM, unclear amount. Small amount of blood this am second BM no blood. BM soft. Post operative pain. HGB stable, 9.6 yesterday, 9.8 today. Advanced to low  residue diet today         Review of Systems   Constitutional:  Negative.    Eye:  Negative.    Ear/Nose/Mouth/Throat:  Negative.    Cardiovascular:  Negative.    Respiratory:  Negative.    Gastrointestinal:  Negative.    Genitourinary:  Negative.    Musculoskeletal:  Negative.    Integumentary:  Negative.    Hematology/Lymphatics:  Negative.    Neurologic:  Negative.    Endocrine:  Negative.    Allergy/Immunologic:  Negative.    Psychiatric:  Negative.    All other systems All other systems are negative.     Histories   Social History       Alcohol  Details: Alcohol Abuse in Household: No.  Use: None.  Exercise  Details: Exercise: Never.  Substance Abuse  Details: Substance Abuse in Household: No.  Use: None.  Tobacco  Details: Smoked/Smokeless Tobacco Last 30 Days: Yes.  Smoking Tobacco Use: Current every day smoker.  Smokeless Tobacco Use Never.  .       Health Status   Allergies: Allergies (ST)   Allergies (3) Active Reaction  codeine \"I passed out\", headache  levothyroxine \"Made me sick\"  Flagyl Throat swelling    Current medications:    Medications (11) Active  Scheduled: (6)  Acetaminophen 325 mg tab  650 mg 2 tab, Oral,  Q8H  Alvimopan 12 mg cap  12 mg 1 cap, Oral, Q12H  Budesonide-formoterol 160-4.5 mcg oral MDI 6 gm  2 puff, Inhaled, BID  Chlorhexidine gluconate 0.12% ORAL RINSE 15 mL repack  15 mL, Oral Mucosa, Q12H  Pantoprazole 40 mg DR tab  40 mg 1 tab, Oral, Daily  Pregabalin 25 mg cap  25 mg 1 cap, Oral, Q12H  Continuous: (0)  PRN: (5)  MetoCLOPramide 10 mg tab  10 mg 1 tab, Oral, Q6H  Morphine 5 mg/2.5 mL oral liquid repack  5 mg 2.5 mL, Oral, Q2H  Ondansetron 4 mg/2 mL inj SDV  4 mg 2 mL, Slow IV Push, Q6H  OxyCODONE 5 mg IR tab  5 mg 1 tab, Oral, Q4H  OxyCODONE 5 mg IR tab  10 mg 2 tab, Oral, Q4H      Physical Examination   VS/Measurements       Vitals between:   09-JUL-2019 08:09:28   TO   10-JUL-2019 08:09:28                   LAST RESULT MINIMUM MAXIMUM  Temperature 37 36.5 37  Heart Rate 89 79 89  Respiratory Rate 16 16 16  NISBP           121 96 122  NIDBP           68 56 73  SpO2                    98 97 98    General:  Alert and oriented, No acute distress.    Eye:  Pupils are equal, round and reactive to light.    Neck:  Supple, Non-tender.    Respiratory:  Lungs are clear to auscultation, Respirations are non-labored, Breath sounds are equal.   Cardiovascular:  Normal rate, Regular rhythm.    Gastrointestinal:  Soft, Non-tender, Non-distended, Normal bowel sounds.   Integumentary:  Warm, Dry, Pink.      Review / Management   Laboratory results:       Labs between:  09-JUL-2019 08:09 to 10-JUL-2019 08:09    CBC:                 WBC  HgB  Hct  Plt  MCV  RDW   10-JUL-2019 7.5  (L) 9.8  (L) 29.4  231  92.5  14.0   09-JUL-2019   (L) 9.6  (L) 28.5           BMP:                 Na  Cl  BUN  Glu   10-JUL-2019 143  (H) 110  (L) 4  86                              K  CO2  Cr  Ca                              (L) 3.3  29  0.58  8.4                  .      I & O between:  09-JUL-2019 08:09 TO 10-JUL-2019 08:09  Med Dosing Weight:  63.6  kg   03-JUL-2019  24 Hour Intake:   550.00  ( 8.65 mL/kg )  24 Hour Output:   200.00            24 Hour Urine/Stool Output:   0.0  24 Hour Balance:   350.00           24 Hour Urine Output:   200.00  ( 0.13 mL/kg/hr )                   Urine Count:  6.00           Impression and Plan   Dx and Plan:     Diagnosis     1. Hematochezia-yesterday, small amout this am, HGB stable  2. Diverticulitis post resection.     .    Orders     1. Will continue to monitor since bleeding slowing down will hold on endoscopic evaluation at this time  2. Monitor H and H  3. Diet and activity per surgical team  4. If bleeding returns/increases please notify GI service immediately, may need endoscopic evaluation    Discussed with patient and RN  Will send message to surgical team. .     .   There are no Wet Read(s) to document.

## 2024-04-05 ENCOUNTER — INPATIENT (INPATIENT)
Facility: HOSPITAL | Age: 89
LOS: 4 days | Discharge: NURSING, REHAB OR LONG-TERM CARE | DRG: 93 | End: 2024-04-10
Attending: INTERNAL MEDICINE | Admitting: STUDENT IN AN ORGANIZED HEALTH CARE EDUCATION/TRAINING PROGRAM
Payer: MEDICARE

## 2024-04-05 VITALS
TEMPERATURE: 97 F | RESPIRATION RATE: 20 BRPM | WEIGHT: 139.99 LBS | DIASTOLIC BLOOD PRESSURE: 82 MMHG | HEART RATE: 102 BPM | HEIGHT: 66 IN | SYSTOLIC BLOOD PRESSURE: 160 MMHG | OXYGEN SATURATION: 97 %

## 2024-04-05 DIAGNOSIS — Z90.49 ACQUIRED ABSENCE OF OTHER SPECIFIED PARTS OF DIGESTIVE TRACT: Chronic | ICD-10-CM
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
## 2024-04-05 DIAGNOSIS — R60.0 LOCALIZED EDEMA: ICD-10-CM

## 2024-04-05 DIAGNOSIS — I10 ESSENTIAL (PRIMARY) HYPERTENSION: ICD-10-CM

## 2024-04-05 DIAGNOSIS — A41.9 SEPSIS, UNSPECIFIED ORGANISM: ICD-10-CM

## 2024-04-05 DIAGNOSIS — J21.9 ACUTE BRONCHIOLITIS, UNSPECIFIED: ICD-10-CM

## 2024-04-05 DIAGNOSIS — E03.9 HYPOTHYROIDISM, UNSPECIFIED: ICD-10-CM

## 2024-04-05 DIAGNOSIS — Z98.890 OTHER SPECIFIED POSTPROCEDURAL STATES: Chronic | ICD-10-CM

## 2024-04-05 DIAGNOSIS — Z29.9 ENCOUNTER FOR PROPHYLACTIC MEASURES, UNSPECIFIED: ICD-10-CM

## 2024-04-05 DIAGNOSIS — N40.0 BENIGN PROSTATIC HYPERPLASIA WITHOUT LOWER URINARY TRACT SYMPTOMS: ICD-10-CM

## 2024-04-05 DIAGNOSIS — E78.5 HYPERLIPIDEMIA, UNSPECIFIED: ICD-10-CM

## 2024-04-05 DIAGNOSIS — N39.0 URINARY TRACT INFECTION, SITE NOT SPECIFIED: ICD-10-CM

## 2024-04-05 DIAGNOSIS — R25.2 CRAMP AND SPASM: ICD-10-CM

## 2024-04-05 DIAGNOSIS — R29.6 REPEATED FALLS: ICD-10-CM

## 2024-04-05 LAB
ALBUMIN SERPL ELPH-MCNC: 3.4 G/DL — SIGNIFICANT CHANGE UP (ref 3.3–5)
ALP SERPL-CCNC: 151 U/L — HIGH (ref 40–120)
ALT FLD-CCNC: 24 U/L — SIGNIFICANT CHANGE UP (ref 12–78)
ANION GAP SERPL CALC-SCNC: 12 MMOL/L — SIGNIFICANT CHANGE UP (ref 5–17)
APPEARANCE UR: CLEAR — SIGNIFICANT CHANGE UP
APTT BLD: 33.6 SEC — SIGNIFICANT CHANGE UP (ref 24.5–35.6)
AST SERPL-CCNC: 33 U/L — SIGNIFICANT CHANGE UP (ref 15–37)
BACTERIA # UR AUTO: ABNORMAL /HPF
BASOPHILS # BLD AUTO: 0.04 K/UL — SIGNIFICANT CHANGE UP (ref 0–0.2)
BASOPHILS NFR BLD AUTO: 0.3 % — SIGNIFICANT CHANGE UP (ref 0–2)
BILIRUB SERPL-MCNC: 1.1 MG/DL — SIGNIFICANT CHANGE UP (ref 0.2–1.2)
BILIRUB UR-MCNC: NEGATIVE — SIGNIFICANT CHANGE UP
BUN SERPL-MCNC: 26 MG/DL — HIGH (ref 7–23)
CALCIUM SERPL-MCNC: 9.3 MG/DL — SIGNIFICANT CHANGE UP (ref 8.5–10.1)
CHLORIDE SERPL-SCNC: 91 MMOL/L — LOW (ref 96–108)
CK SERPL-CCNC: 170 U/L — SIGNIFICANT CHANGE UP (ref 26–308)
CO2 SERPL-SCNC: 27 MMOL/L — SIGNIFICANT CHANGE UP (ref 22–31)
COLOR SPEC: YELLOW — SIGNIFICANT CHANGE UP
CREAT SERPL-MCNC: 1.1 MG/DL — SIGNIFICANT CHANGE UP (ref 0.5–1.3)
DIFF PNL FLD: ABNORMAL
EGFR: 61 ML/MIN/1.73M2 — SIGNIFICANT CHANGE UP
EOSINOPHIL # BLD AUTO: 0.05 K/UL — SIGNIFICANT CHANGE UP (ref 0–0.5)
EOSINOPHIL NFR BLD AUTO: 0.4 % — SIGNIFICANT CHANGE UP (ref 0–6)
GLUCOSE SERPL-MCNC: 123 MG/DL — HIGH (ref 70–99)
GLUCOSE UR QL: NEGATIVE MG/DL — SIGNIFICANT CHANGE UP
HCT VFR BLD CALC: 30 % — LOW (ref 39–50)
HGB BLD-MCNC: 10.5 G/DL — LOW (ref 13–17)
IMM GRANULOCYTES NFR BLD AUTO: 1.4 % — HIGH (ref 0–0.9)
INR BLD: 1.26 RATIO — HIGH (ref 0.85–1.18)
KETONES UR-MCNC: ABNORMAL MG/DL
LACTATE SERPL-SCNC: 0.9 MMOL/L — SIGNIFICANT CHANGE UP (ref 0.7–2)
LEUKOCYTE ESTERASE UR-ACNC: ABNORMAL
LYMPHOCYTES # BLD AUTO: 0.8 K/UL — LOW (ref 1–3.3)
LYMPHOCYTES # BLD AUTO: 5.6 % — LOW (ref 13–44)
MCHC RBC-ENTMCNC: 30.4 PG — SIGNIFICANT CHANGE UP (ref 27–34)
MCHC RBC-ENTMCNC: 35 GM/DL — SIGNIFICANT CHANGE UP (ref 32–36)
MCV RBC AUTO: 87 FL — SIGNIFICANT CHANGE UP (ref 80–100)
MONOCYTES # BLD AUTO: 0.66 K/UL — SIGNIFICANT CHANGE UP (ref 0–0.9)
MONOCYTES NFR BLD AUTO: 4.7 % — SIGNIFICANT CHANGE UP (ref 2–14)
NEUTROPHILS # BLD AUTO: 12.41 K/UL — HIGH (ref 1.8–7.4)
NEUTROPHILS NFR BLD AUTO: 87.6 % — HIGH (ref 43–77)
NITRITE UR-MCNC: POSITIVE
NRBC # BLD: 0 /100 WBCS — SIGNIFICANT CHANGE UP (ref 0–0)
PH UR: 7.5 — SIGNIFICANT CHANGE UP (ref 5–8)
PLATELET # BLD AUTO: 218 K/UL — SIGNIFICANT CHANGE UP (ref 150–400)
POTASSIUM SERPL-MCNC: 4 MMOL/L — SIGNIFICANT CHANGE UP (ref 3.5–5.3)
POTASSIUM SERPL-SCNC: 4 MMOL/L — SIGNIFICANT CHANGE UP (ref 3.5–5.3)
PROT SERPL-MCNC: 7.3 G/DL — SIGNIFICANT CHANGE UP (ref 6–8.3)
PROT UR-MCNC: NEGATIVE MG/DL — SIGNIFICANT CHANGE UP
PROTHROM AB SERPL-ACNC: 14.6 SEC — HIGH (ref 9.5–13)
RBC # BLD: 3.45 M/UL — LOW (ref 4.2–5.8)
RBC # FLD: 16.5 % — HIGH (ref 10.3–14.5)
RBC CASTS # UR COMP ASSIST: 0 /HPF — SIGNIFICANT CHANGE UP (ref 0–4)
SODIUM SERPL-SCNC: 130 MMOL/L — LOW (ref 135–145)
SP GR SPEC: 1.01 — SIGNIFICANT CHANGE UP (ref 1–1.03)
UROBILINOGEN FLD QL: 0.2 MG/DL — SIGNIFICANT CHANGE UP (ref 0.2–1)
WBC # BLD: 14.16 K/UL — HIGH (ref 3.8–10.5)
WBC # FLD AUTO: 14.16 K/UL — HIGH (ref 3.8–10.5)
WBC UR QL: 5 /HPF — SIGNIFICANT CHANGE UP (ref 0–5)

## 2024-04-05 PROCEDURE — 70450 CT HEAD/BRAIN W/O DYE: CPT | Mod: 26,MC

## 2024-04-05 PROCEDURE — 72125 CT NECK SPINE W/O DYE: CPT | Mod: 26,MC

## 2024-04-05 PROCEDURE — 71250 CT THORAX DX C-: CPT | Mod: 26,MC

## 2024-04-05 PROCEDURE — 74176 CT ABD & PELVIS W/O CONTRAST: CPT | Mod: 26,MC

## 2024-04-05 PROCEDURE — 99222 1ST HOSP IP/OBS MODERATE 55: CPT

## 2024-04-05 PROCEDURE — 99223 1ST HOSP IP/OBS HIGH 75: CPT | Mod: GC

## 2024-04-05 PROCEDURE — 99285 EMERGENCY DEPT VISIT HI MDM: CPT

## 2024-04-05 RX ORDER — LEVOTHYROXINE SODIUM 125 MCG
1 TABLET ORAL
Refills: 0 | DISCHARGE

## 2024-04-05 RX ORDER — CEFTRIAXONE 500 MG/1
1000 INJECTION, POWDER, FOR SOLUTION INTRAMUSCULAR; INTRAVENOUS EVERY 24 HOURS
Refills: 0 | Status: DISCONTINUED | OUTPATIENT
Start: 2024-04-05 | End: 2024-04-05

## 2024-04-05 RX ORDER — SIMVASTATIN 20 MG/1
1 TABLET, FILM COATED ORAL
Refills: 0 | DISCHARGE

## 2024-04-05 RX ORDER — AMLODIPINE BESYLATE 2.5 MG/1
2.5 TABLET ORAL
Refills: 0 | Status: DISCONTINUED | OUTPATIENT
Start: 2024-04-05 | End: 2024-04-10

## 2024-04-05 RX ORDER — LEVOTHYROXINE SODIUM 125 MCG
112 TABLET ORAL DAILY
Refills: 0 | Status: DISCONTINUED | OUTPATIENT
Start: 2024-04-05 | End: 2024-04-10

## 2024-04-05 RX ORDER — SIMVASTATIN 20 MG/1
5 TABLET, FILM COATED ORAL AT BEDTIME
Refills: 0 | Status: DISCONTINUED | OUTPATIENT
Start: 2024-04-05 | End: 2024-04-10

## 2024-04-05 RX ORDER — FINASTERIDE 5 MG/1
1 TABLET, FILM COATED ORAL
Refills: 0 | DISCHARGE

## 2024-04-05 RX ORDER — ASPIRIN/CALCIUM CARB/MAGNESIUM 324 MG
81 TABLET ORAL DAILY
Refills: 0 | Status: DISCONTINUED | OUTPATIENT
Start: 2024-04-05 | End: 2024-04-10

## 2024-04-05 RX ORDER — CEFEPIME 1 G/1
1000 INJECTION, POWDER, FOR SOLUTION INTRAMUSCULAR; INTRAVENOUS EVERY 24 HOURS
Refills: 0 | Status: DISCONTINUED | OUTPATIENT
Start: 2024-04-05 | End: 2024-04-05

## 2024-04-05 RX ORDER — ALLOPURINOL 300 MG
300 TABLET ORAL DAILY
Refills: 0 | Status: DISCONTINUED | OUTPATIENT
Start: 2024-04-05 | End: 2024-04-10

## 2024-04-05 RX ORDER — ALLOPURINOL 300 MG
1 TABLET ORAL
Refills: 0 | DISCHARGE

## 2024-04-05 RX ORDER — FUROSEMIDE 40 MG
40 TABLET ORAL DAILY
Refills: 0 | Status: DISCONTINUED | OUTPATIENT
Start: 2024-04-05 | End: 2024-04-10

## 2024-04-05 RX ORDER — MIRTAZAPINE 45 MG/1
15 TABLET, ORALLY DISINTEGRATING ORAL AT BEDTIME
Refills: 0 | Status: DISCONTINUED | OUTPATIENT
Start: 2024-04-05 | End: 2024-04-10

## 2024-04-05 RX ORDER — AMLODIPINE BESYLATE 2.5 MG/1
1 TABLET ORAL
Refills: 0 | DISCHARGE

## 2024-04-05 RX ORDER — FINASTERIDE 5 MG/1
5 TABLET, FILM COATED ORAL DAILY
Refills: 0 | Status: DISCONTINUED | OUTPATIENT
Start: 2024-04-05 | End: 2024-04-10

## 2024-04-05 RX ORDER — MIRTAZAPINE 45 MG/1
1 TABLET, ORALLY DISINTEGRATING ORAL
Refills: 0 | DISCHARGE

## 2024-04-05 RX ORDER — ATENOLOL 25 MG/1
1 TABLET ORAL
Refills: 0 | DISCHARGE

## 2024-04-05 RX ORDER — ASPIRIN/CALCIUM CARB/MAGNESIUM 324 MG
0 TABLET ORAL
Refills: 0 | DISCHARGE

## 2024-04-05 RX ORDER — FUROSEMIDE 40 MG
2 TABLET ORAL
Refills: 0 | DISCHARGE

## 2024-04-05 RX ORDER — ALBUTEROL 90 UG/1
2.5 AEROSOL, METERED ORAL EVERY 12 HOURS
Refills: 0 | Status: DISCONTINUED | OUTPATIENT
Start: 2024-04-05 | End: 2024-04-10

## 2024-04-05 RX ORDER — SODIUM CHLORIDE 9 MG/ML
500 INJECTION INTRAMUSCULAR; INTRAVENOUS; SUBCUTANEOUS ONCE
Refills: 0 | Status: COMPLETED | OUTPATIENT
Start: 2024-04-05 | End: 2024-04-05

## 2024-04-05 RX ORDER — ACETAMINOPHEN 500 MG
650 TABLET ORAL EVERY 6 HOURS
Refills: 0 | Status: DISCONTINUED | OUTPATIENT
Start: 2024-04-05 | End: 2024-04-10

## 2024-04-05 RX ORDER — ALPRAZOLAM 0.25 MG
0.25 TABLET ORAL ONCE
Refills: 0 | Status: DISCONTINUED | OUTPATIENT
Start: 2024-04-05 | End: 2024-04-05

## 2024-04-05 RX ORDER — PENTOXIFYLLINE 400 MG
1 TABLET, EXTENDED RELEASE ORAL
Refills: 0 | DISCHARGE

## 2024-04-05 RX ORDER — CEFEPIME 1 G/1
1000 INJECTION, POWDER, FOR SOLUTION INTRAMUSCULAR; INTRAVENOUS ONCE
Refills: 0 | Status: DISCONTINUED | OUTPATIENT
Start: 2024-04-05 | End: 2024-04-05

## 2024-04-05 RX ORDER — PENTOXIFYLLINE 400 MG
400 TABLET, EXTENDED RELEASE ORAL THREE TIMES A DAY
Refills: 0 | Status: DISCONTINUED | OUTPATIENT
Start: 2024-04-05 | End: 2024-04-10

## 2024-04-05 RX ORDER — LIDOCAINE 4 G/100G
1 CREAM TOPICAL DAILY
Refills: 0 | Status: DISCONTINUED | OUTPATIENT
Start: 2024-04-05 | End: 2024-04-10

## 2024-04-05 RX ORDER — CEFEPIME 1 G/1
INJECTION, POWDER, FOR SOLUTION INTRAMUSCULAR; INTRAVENOUS
Refills: 0 | Status: DISCONTINUED | OUTPATIENT
Start: 2024-04-05 | End: 2024-04-05

## 2024-04-05 RX ORDER — PANTOPRAZOLE SODIUM 20 MG/1
40 TABLET, DELAYED RELEASE ORAL
Refills: 0 | Status: DISCONTINUED | OUTPATIENT
Start: 2024-04-05 | End: 2024-04-10

## 2024-04-05 RX ORDER — PANTOPRAZOLE SODIUM 20 MG/1
1 TABLET, DELAYED RELEASE ORAL
Refills: 0 | DISCHARGE

## 2024-04-05 RX ORDER — ENOXAPARIN SODIUM 100 MG/ML
40 INJECTION SUBCUTANEOUS EVERY 24 HOURS
Refills: 0 | Status: DISCONTINUED | OUTPATIENT
Start: 2024-04-05 | End: 2024-04-08

## 2024-04-05 RX ORDER — SODIUM CHLORIDE 9 MG/ML
1000 INJECTION INTRAMUSCULAR; INTRAVENOUS; SUBCUTANEOUS
Refills: 0 | Status: DISCONTINUED | OUTPATIENT
Start: 2024-04-05 | End: 2024-04-05

## 2024-04-05 RX ORDER — ATENOLOL 25 MG/1
25 TABLET ORAL DAILY
Refills: 0 | Status: DISCONTINUED | OUTPATIENT
Start: 2024-04-05 | End: 2024-04-10

## 2024-04-05 RX ORDER — SODIUM CHLORIDE 9 MG/ML
1000 INJECTION INTRAMUSCULAR; INTRAVENOUS; SUBCUTANEOUS ONCE
Refills: 0 | Status: COMPLETED | OUTPATIENT
Start: 2024-04-05 | End: 2024-04-05

## 2024-04-05 RX ADMIN — SODIUM CHLORIDE 1000 MILLILITER(S): 9 INJECTION INTRAMUSCULAR; INTRAVENOUS; SUBCUTANEOUS at 08:07

## 2024-04-05 RX ADMIN — Medication 400 MILLIGRAM(S): at 14:04

## 2024-04-05 RX ADMIN — SODIUM CHLORIDE 500 MILLILITER(S): 9 INJECTION INTRAMUSCULAR; INTRAVENOUS; SUBCUTANEOUS at 03:53

## 2024-04-05 RX ADMIN — SIMVASTATIN 5 MILLIGRAM(S): 20 TABLET, FILM COATED ORAL at 22:20

## 2024-04-05 RX ADMIN — AMLODIPINE BESYLATE 2.5 MILLIGRAM(S): 2.5 TABLET ORAL at 22:20

## 2024-04-05 RX ADMIN — ALBUTEROL 2.5 MILLIGRAM(S): 90 AEROSOL, METERED ORAL at 20:04

## 2024-04-05 RX ADMIN — FINASTERIDE 5 MILLIGRAM(S): 5 TABLET, FILM COATED ORAL at 11:12

## 2024-04-05 RX ADMIN — LIDOCAINE 1 PATCH: 4 CREAM TOPICAL at 11:12

## 2024-04-05 RX ADMIN — Medication 5 MILLIGRAM(S): at 22:19

## 2024-04-05 RX ADMIN — MIRTAZAPINE 15 MILLIGRAM(S): 45 TABLET, ORALLY DISINTEGRATING ORAL at 22:20

## 2024-04-05 RX ADMIN — Medication 81 MILLIGRAM(S): at 11:11

## 2024-04-05 RX ADMIN — Medication 1 TABLET(S): at 11:12

## 2024-04-05 RX ADMIN — Medication 0.25 MILLIGRAM(S): at 22:20

## 2024-04-05 RX ADMIN — ENOXAPARIN SODIUM 40 MILLIGRAM(S): 100 INJECTION SUBCUTANEOUS at 11:13

## 2024-04-05 RX ADMIN — Medication 400 MILLIGRAM(S): at 22:19

## 2024-04-05 RX ADMIN — Medication 300 MILLIGRAM(S): at 11:12

## 2024-04-05 RX ADMIN — CEFEPIME 100 MILLIGRAM(S): 1 INJECTION, POWDER, FOR SOLUTION INTRAMUSCULAR; INTRAVENOUS at 09:18

## 2024-04-05 NOTE — PHYSICAL THERAPY INITIAL EVALUATION ADULT - GAIT TRAINING, PT EVAL
Patient will ambulate 10 feet with MinAx1 with rolling walker by 2 weeks to allow for increased independence in the community.

## 2024-04-05 NOTE — OCCUPATIONAL THERAPY INITIAL EVALUATION ADULT - PERTINENT HX OF CURRENT PROBLEM, REHAB EVAL
89yo M PMHx HTN, hypothyroidism, HLD, chronic back pain 2/2 spinal stenosis, chronic indwelling Neumann brought in by EMS for mechanical fall. Admitted for sepsis, UTI, displaced rib fx.

## 2024-04-05 NOTE — PHYSICAL THERAPY INITIAL EVALUATION ADULT - TRANSFER TRAINING, PT EVAL
Patient will perform sit<->stand transfer MinAx1 with rolling walker by 2 weeks to allow patient to get on/off toilet safely.

## 2024-04-05 NOTE — CONSULT NOTE ADULT - SUBJECTIVE AND OBJECTIVE BOX
[facility], Division of Infectious Diseases  ANH Mckinney S. Shah, Y. Patel, [facility]   [phone]  after hours and weekends [phone]    JOIE DIAZ  89y, Male  [id number]    HPI--  HPI:  89yo M PMHx HTN, hypothyroidism, HLD, chronic back pain 2/2 spinal stenosis, chronic indwelling Stewart brought in by EMS for mechanical fall.   pt states he has pain all the time in his back and he was trying to get up out of chair and fell   then could not walk  no fever, no chills, no cough, no diarrhea  he is constipated  no recent antibx  no sick contacts        PMH/PSH--  HTN (hypertension)  High cholesterol  Spinal stenosis  Hypothyroidism  Insomnia  S/P cholecystectomy  H/O inguinal hernia repair  S/P cardiac cath        Allergies--  dilantin  augmentin       Medications--  Antibiotics: cefepime   IVPB 1000 milliGRAM(s) IV Intermittent every 24 hours    Immunologic:   Other: acetaminophen     Tablet .. PRN  albuterol    0.083%  allopurinol  amLODIPine   Tablet  aspirin enteric coated  atenolol  Tablet  bisacodyl PRN  enoxaparin Injectable  finasteride  furosemide    Tablet  levothyroxine  lidocaine   4% Patch  mirtazapine  multivitamin  pantoprazole    Tablet  pentoxifylline  simvastatin      Social History--  EtOH: denies ***  Tobacco: denies ***  Drug Use: denies ***    Family/Marital History--        Travel/Environmental/Occupational History:  retired      Review of Systems:  REVIEW OF SYSTEMS  General: no fever, no chills, no wt loss	  Ophthalmologic: no blurry vision  Respiratory and Thorax: no cough, no dyspnea  Cardiovascular: no chest pain, no palpitations  Gastrointestinal:  no nausea, no vomiting, diarrhea  Genitourinary: no dysuria, no urgency, no frequency	  Musculoskeletal: no myalgias	  Neurological:  no headache	    Physical Exam--  Vital Signs: T(F): 97.8 (04-05-24 @ 12:10), Max: 97.8 (04-05-24 @ 12:10)  HR: 72 (04-05-24 @ 12:10)  BP: 144/85 (04-05-24 @ 12:10)  RR: 18 (04-05-24 @ 12:10)  SpO2: 98% (04-05-24 @ 12:10)  Wt(kg): --  General: Nontoxic-appearing Male in no acute distress.  HEENT: AT/NC. PERRL. EOMI. Anicteric. Conjunctiva pink and moist. Oropharynx clear. Dentition fair.  Neck: Not rigid. No sense of mass.  Nodes: None palpable.  Lungs: Clear bilaterally without rales, wheezing or rhonchi  Heart: Regular rate and rhythm. +sm  Abdomen: Bowel sounds present and normoactive. Soft. Nondistended. Nontender. N  Back: No spinal tenderness. No costovertebral angle tenderness.   Extremities: No cyanosis or clubbing. + edema.   Skin: Warm. Dry. Good turgor. No rash. No vasculitic stigmata.  Psychiatric: Appropriate affect and mood for situation.   rylie stewart       Laboratory & Imaging Data--  CBC                        10.5   14.16 )-----------( 218      ( 05 Apr 2024 02:35 )             30.0       Chemistries  04-05    130<L>  |  91<L>  |  26<H>  ----------------------------<  123<H>  4.0   |  27  |  1.10    Ca    9.3      05 Apr 2024 02:35    TPro  7.3  /  Alb  3.4  /  TBili  1.1  /  DBili  x   /  AST  33  /  ALT  24  /  AlkPhos  151<H>  04-05      Culture Data    < from: CT Abdomen and Pelvis No Cont (04.05.24 @ 04:35) >    CONTRAST/COMPLICATIONS:  IV Contrast: NONE  Oral Contrast: NONE  Complications: None reported at time of study completion    PROCEDURE:  CT of the Chest, Abdomen and Pelvis was performed.  Sagittal and coronal reformats were performed.    FINDINGS:    Evaluation is degraded by motion and patient positioning with arms over   chest and abdomen.    CHEST:  LUNGS AND LARGE AIRWAYS: Patent central airways. Scattered tree-in bud   opacities at the lung bases in the lower lobes, lingula, and right middle   lobe. Mild smooth interlobular septal thickening at the lung bases.  PLEURA: Small bilateral pleural effusions. No pneumothorax.  VESSELS: Main pulmonary artery is not dilated. Thoracic aorta is normal   in caliber. Atherosclerotic calcifications ofthe thoracic aorta, great   vessels, and coronary arteries.  HEART: Heart is enlarged. No pericardial effusion. Calcifications of the   aortic valve leaflets and mitral annulus.  MEDIASTINUM AND GONSALO: No lymphadenopathy.  CHEST WALL AND LOWER NECK: Within normal limits.    ABDOMEN AND PELVIS:  LIVER: Within normal limits.  BILE DUCTS: Normal caliber.  GALLBLADDER: Not visualized.  SPLEEN: Within normal limits.  PANCREAS: Within normal limits.  ADRENALS: Within normal limits.  KIDNEYS/URETERS: No right or left hydroureteronephrosis or obstructing   urinary tract calculus. Unchanged left renal cortical calcification   measuring up to 1.2 cm.    BLADDER: Stewart catheter. Bladder calculi. Thickening of the urinary   bladder walls despite underdistention.  REPRODUCTIVE ORGANS: Mildly enlarged prostate gland.    BOWEL: No bowel obstruction. Appendix is not visualized. Colonic   diverticulosis without evidence of diverticulitis.  PERITONEUM: No ascites, pneumoperitoneum, or hemoperitoneum. No   mesenteric lymphadenopathy.  VESSELS: Atherosclerotic calcifications of the aortoiliac tree. Normal   caliber abdominal aorta.  RETROPERITONEUM/LYMPH NODES: No lymphadenopathy. No retroperitoneal   hematoma.  ABDOMINAL WALL: Mild generalized soft tissue edema.  BONES: Acute displaced left posterior 11th and 12th rib fractures.   Degenerative changes of the spine, hips, and glenohumeral joints.   Unchanged sclerotic lesion in the right iliac wing.    IMPRESSION:  Acute displaced left posterior 11th and 12th rib fractures. No   pneumothorax.    Scattered tree-in bud opacities at the lung bases in the lower lobes,   lingula, and right middle lobe likely reflective of a bronchiolitis.    Mild smooth interlobular septal thickening suggestive of mild  interstitial edema. Small bilateral pleural effusions.    No acute intra-abdominal or pelvic findings on unenhanced CT of the   abdomen and pelvis. Lack of intravenous contrast limits evaluation of the   solid abdominal organs and vasculature.        < end of copied text >

## 2024-04-05 NOTE — CONSULT NOTE ADULT - ASSESSMENT
89yo M PMHx HTN, hypothyroidism, HLD, chronic back pain 2/2 spinal stenosis, chronic indwelling Neumann brought in by EMS for mechanical fall.    tachy leukocytosis - sepsis v sirs     plan  ua neg- np pyuria   ct chest noted -- no hypoxia no cough, no dsypnea   no tachypnea  no s/s of pna  no skin skin soft tissue infection  no diarrhea abd exam benign    stop cefepime  trend wbc 
89-year-old male history of hypertension hypothyroid with chronic indwelling Neumann catheter brought in by EMS status post multiple fall     fall risk  ataxic gait  bronchiolitis  atelectasis  effusions  rib fx - 11 and 12 -   HTN  Hypothyroidism  BPH    eval for acute infection - LRTI - bronchiolitis - consider ID eval  Albuterol BID nebs for mucociliary clearance  I aaron if able to cooperate  monitor VS and Sat  goal sat > 88 pct  pain rx regimen for Rib fx - topical - oral and systemic agents  PT eval   Bowel rx regimen  fall prec  assist with needs  GOC discussion  cvs rx regimen - BP control - on Diuretic -   pt has chronic Neumann Cath  old records reviewed

## 2024-04-05 NOTE — H&P ADULT - PROBLEM SELECTOR PLAN 3
- Mechanical fall, recently admitted 2/2024 for fall/weakness  - CT Head:  No CT evidence of acute intracranial pathology.  - CT AP: Acute displaced left posterior 11th and 12th rib fractures. No pneumothorax  - stat CK  - lidocaine patch for pain  - Fall precautions  - PT/OT consult  - SW consult  - Palliative consult  - CT surgery consulted - Mechanical fall, recently admitted 2/2024 for fall/weakness  - CT Head:  No CT evidence of acute intracranial pathology.  - CT AP: Acute displaced left posterior 11th and 12th rib fractures. No pneumothorax  - stat CK  - lidocaine patch for pain  - Fall precautions  - PT/OT consult  - SW consult  - Palliative consult  - Pulm consulted

## 2024-04-05 NOTE — H&P ADULT - PROBLEM SELECTOR PLAN 1
Patient meets sepsis criteria on admission: WBC 14.16,  likely 2/2 UTI   - UA moderate leuk esterase, positive nitrite  - CT chest: Scattered tree-in bud opacities at the lung bases in the lower lobes, lingula, and right middle lobe likely reflective of a bronchiolitis. Mild smooth interlobular septal thickening suggestive of mild interstitial edema. Small bilateral pleural effusions.   - CT a/p: No acute intra-abdominal or pelvic findings on unenhanced CT of the abdomen and pelvis. Lack of intravenous contrast limits evaluation of the solid abdominal organs and vasculature.  - S/P 500cc NS bolus  - stat 1L NS bolus, then continue IV NS @ 75 cc/hr for 12 hours  - stat lactate, f/u repeat  - Trend WBC and monitor for fever  - F/u UCx, BCx x2  - ID consulted, f/u recs Patient meets sepsis criteria on admission: WBC 14.16,  likely 2/2 UTI   - UA moderate leuk esterase, positive nitrite  - CT chest: Scattered tree-in bud opacities at the lung bases in the lower lobes, lingula, and right middle lobe likely reflective of a bronchiolitis. Mild smooth interlobular septal thickening suggestive of mild interstitial edema. Small bilateral pleural effusions.   - CT a/p: No acute intra-abdominal or pelvic findings on unenhanced CT of the abdomen and pelvis. Lack of intravenous contrast limits evaluation of the solid abdominal organs and vasculature.  - S/P 500cc NS bolus  - continue IV NS @ 75 cc/hr for 12 hours  - stat lactate, f/u repeat  - Trend WBC and monitor for fever  - F/u UCx, BCx x2  - ID consulted, f/u recs Patient meets sepsis criteria on admission: WBC 14.16,  likely 2/2 UTI   - UA moderate leuk esterase, positive nitrite  - CT chest: Scattered tree-in bud opacities at the lung bases in the lower lobes, lingula, and right middle lobe likely reflective of a bronchiolitis. Mild smooth interlobular septal thickening suggestive of mild interstitial edema. Small bilateral pleural effusions.   - CT a/p: No acute intra-abdominal or pelvic findings on unenhanced CT of the abdomen and pelvis. Lack of intravenous contrast limits evaluation of the solid abdominal organs and vasculature.  - S/P 500cc NS bolus  - stat lactate, f/u repeat  - Trend WBC and monitor for fever  - F/u UCx, BCx x2  - ID consulted, f/u recs Patient meets sepsis criteria on admission: WBC 14.16,  likely 2/2 UTI   - UA moderate leuk esterase, positive nitrite  - CT chest: Scattered tree-in bud opacities at the lung bases in the lower lobes, lingula, and right middle lobe likely reflective of a bronchiolitis. Mild smooth interlobular septal thickening suggestive of mild interstitial edema. Small bilateral pleural effusions.   - CT a/p: No acute intra-abdominal or pelvic findings on unenhanced CT of the abdomen and pelvis. Lack of intravenous contrast limits evaluation of the solid abdominal organs and vasculature.  - S/P 500cc NS bolus  - stat lactate, f/u repeat  - Trend WBC and monitor for fever  - F/u UCx, BCx x2  -start Cefepime and f/u ID rec's Dr Shahida Guzman  - ID consulted, f/u recs

## 2024-04-05 NOTE — PHYSICAL THERAPY INITIAL EVALUATION ADULT - ADDITIONAL COMMENTS
Patient reports he lives alone in a private house, [address] with handrails (pt negotiates steps sideways) and 2 steps within the home to den level. Pt reports he was independent in ADLs and ambulates with a RW. Pt reports he has a home health aide 5 hours x 2 days for home management. Pt reports he was recently discharged from [facility] ~2 weeks ago and has been participating in home PT 2x/week.

## 2024-04-05 NOTE — ED ADULT NURSE REASSESSMENT NOTE - NS ED NURSE REASSESS COMMENT FT1
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
writer attempted to change patient stewart. Patient and family refused for stewart to be changed. Requested to just have the bag changed. bag changed as requested. MD made aware.
Report taken from ON RN. Patient is A&O4, VSS. Patient laying comfortably in bed at this time. Patient denies SOB, CP or discomfort at this time. Pt awaiting bed.

## 2024-04-05 NOTE — OCCUPATIONAL THERAPY INITIAL EVALUATION ADULT - GENERAL OBSERVATIONS, REHAB EVAL
Pt received supine in bed (+) IV, tele, terry, NAD. Pt agrees to participate with OT for eval and chaka fairly, limited by LBP- RN Niesha made aware.

## 2024-04-05 NOTE — PHYSICAL THERAPY INITIAL EVALUATION ADULT - PATIENT PROFILE REVIEW, REHAB EVAL
PT orders received: ambulate with assistance. Consult with RN Niesha, pt may participate in PT evaluation./yes

## 2024-04-05 NOTE — OCCUPATIONAL THERAPY INITIAL EVALUATION ADULT - TRANSFER TRAINING, PT EVAL
Pt will improve sit to/from stands to modAx1 (+) RW in prep for safe commode transfers within 2-5 sessions.

## 2024-04-05 NOTE — GOALS OF CARE CONVERSATION - ADVANCED CARE PLANNING - CONVERSATION DETAILS
Writer met with pt and son/HCP Jose Martin in ED. Reviewed patient's medical and social history as well as events leading to patient's hospitalization. Writer discussed patient's current diagnosis (mechanical fall, sepsis, HTN, hypothyroid, HLD, chronic stewart.), medical condition and management, prognosis, and life expectancy. Inquired about patient's wishes regarding extent of medical care to be provided including escalation of medical care into the ICU and use of vasopressor support. In addition, the writer inquired about thoughts regarding cardiopulmonary resuscitation, artificial nutrition and hydration including use of feeding tubes and IVF, antibiotics, and further investigative studies such as blood draws and radiology. Pt [place] deferred to son, Jose Martin showed good insight into medical condition. All questions answered. Writer recommended new MOLST. Jose Martin consented to DNR/DNI. MOLST form filled out and placed in chart. Psychosocial support provided.

## 2024-04-05 NOTE — ED PROVIDER NOTE - PHYSICAL EXAMINATION
Gen: Alert, NAD  Head/eyes: NC/AT, PERRL  ENT: airway patent  Neck: supple  Pulm/lung: Bilateral clear BS  CV/heart: RRR  GI/Abd: soft, NT/ND, +BS, no guarding/rebound tenderness  Musculoskeletal: no edema/erythema/cyanosis, FROM in all extremities  Skin: no rash, left forearm abrasion  Neuro: AAOx3, grossly intact

## 2024-04-05 NOTE — CARE COORDINATION ASSESSMENT. - NSCAREPROVIDERS_GEN_ALL_CORE_FT
CARE PROVIDERS:  Accepting Physician: Isamar Ochoa  Access Services: Urmila Mesa  Administration: Jenn Rico  Administration: Victor M Soto  Administration: Oziel Alegria  Administration: Isaura Pennington  Administration: Felicity Chamberlain  Admitting: Isamar Ochoa  Attending: Isamar Ochoa  Consultant: Leonard Yu  Consultant: Dana Walker  Covering Team: Dasia Sharif  ED Attending: Tariq Osborn  ED Nurse: Niesha Rivera  ED Nurse 2: Richard Rincon  Emergency Medicine: Tariq Obsorn  Infection Control: Claire Rivera  Nurse: Ana Lr  Nurse: Jordin Dunn  PCA/Nursing Assistant: Dominga Christine  PCA/Nursing Assistant: Matt Anthony  Physical Therapy: Aliyah Yañez  Primary Team: Abhijeet Vega  Primary Team: Alyssa Rangel  Primary Team: Angel Guzman  Primary Team: Sukhdeep Alvarez  Primary Team: Timothy Harry  : Jhoana Parekh  Team: [facility]  Hospitalists, Team  Team: [facility] Palliative Care, Team  UR// Supp. Assoc.: Jazmyne Ballard  UR// Supp. Assoc.: Sheri Garcia

## 2024-04-05 NOTE — OCCUPATIONAL THERAPY INITIAL EVALUATION ADULT - ADL RETRAINING, OT EVAL
Pt will dress lower body with Arturo, seated, AE as needed within 2-5 sessions. Pt will improve standing chaka to 1-2 min with RW in prep for ADL participation in bathroom within 2-5 sessions.

## 2024-04-05 NOTE — H&P ADULT - NSHPREVIEWOFSYSTEMS_GEN_ALL_CORE
REVIEW OF SYSTEMS:  CONSTITUTIONAL: No fever/chills or appetite changes.  HENMT: No HA, lightheadedness/dizziness  RESPIRATORY: No cough, wheezing, hemoptysis; No shortness of breath.  CARDIOVASCULAR: No chest pain, palpitations.  GASTROINTESTINAL: No abdominal or epigastric pain. No nausea or vomiting; No diarrhea or constipation.  GENITOURINARY: No dysuria, changes in frequency, hematuria, or incontinence  NEUROLOGICAL: + weakness; Sensation intact bilaterally.  SKIN: No itching, rashes  MUSCULOSKELETAL: + low back pain; No joint pain or swelling; No muscle or extremity pain

## 2024-04-05 NOTE — CARE COORDINATION ASSESSMENT. - OTHER PERTINENT REFERRAL INFORMATION
89 y/o male admitted to ED from home with PMHX- HTN ,hypothyroidism, chronic pain 2/2 spinal stenosis. Pt is alert and oriented and able to make his needs known. He lives at home alone, has pvt hire help 2xweekly and was ambulating with a walker prior to admission. Pt states he is known to [facility] for home PT s/p recent DC home from [facility] subacute rehab. Pt has support from son in community, message left for Jose Martin Davidson for follow up and pt is awaiting PT eval to determine appropriate transitional planning. SW will continue to follow to ensure safe transition.

## 2024-04-05 NOTE — ED ADULT NURSE NOTE - NSFALLHARMRISKINTERV_ED_ALL_ED
none Assistance OOB with selected safe patient handling equipment if applicable/Assistance with ambulation/Communicate risk of Fall with Harm to all staff, patient, and family/Monitor gait and stability/Provide patient with walking aids/Provide visual cue: red socks, yellow wristband, yellow gown, etc/Reinforce activity limits and safety measures with patient and family/Use of alarms - bed, stretcher, chair and/or video monitoring/Bed in lowest position, wheels locked, appropriate side rails in place/Call bell, personal items and telephone in reach/Instruct patient to call for assistance before getting out of bed/chair/stretcher/Non-slip footwear applied when patient is off stretcher/[place] to call system/Physically safe environment - no spills, clutter or unnecessary equipment/Purposeful Proactive Rounding/Room/bathroom lighting operational, light cord in reach

## 2024-04-05 NOTE — H&P ADULT - PROBLEM SELECTOR PLAN 10
#Gout  - no recent flares, c/w home allopurinol    # DVT ppx  - Lovenox 40 Chronic  - continue home finasteride  - Chronic Neumann

## 2024-04-05 NOTE — H&P ADULT - NSHPPHYSICALEXAM_GEN_ALL_CORE
CONSTITUTIONAL: Elderly, Well groomed, no apparent distress  EYES: No conjunctival or scleral injection, non-icteric  ENMT: Oral mucosa with moist membranes.   NECK: Supple, symmetric and without tracheal deviation   RESP: No respiratory distress, no use of accessory muscles; CTA b/l, no WRR  CV: RRR, +S1S2, + b/l LE edema  GI: Soft, NT, ND  : + Neumann   LYMPH: No cervical LAD or tenderness  MSK: Normal ROM without pain, no joint pain   SKIN: No rashes or ulcers noted  NEURO: Sensation intact in upper and lower extremities b/l to light touch   PSYCH: Appropriate insight/judgment; A+O x 3, mood and affect appropriate

## 2024-04-05 NOTE — H&P ADULT - PROBLEM SELECTOR PLAN 4
- CT chest: Scattered tree-in bud opacities at the lung bases in the lower lobes, lingula, and right middle lobe likely reflective of a bronchiolitis. Mild smooth interlobular septal thickening suggestive of mild interstitial edema. Small bilateral pleural effusions.   - Pulm Dr. Yu -- rodrigo, incentive spirometry

## 2024-04-05 NOTE — SOCIAL WORK PROGRESS NOTE - NSSWPROGRESSNOTE_GEN_ALL_CORE
Sw consult received, 89 y/o male admitted to ED from home with PMHX- HTN ,hypothyroidism, chronic pain 2/2 spinal stenosis. Pt is alert and oriented and able to make his needs known. He lives at home alone, has pvt hire help 2xweekly and was ambulating with a walker prior to admission. Pt states he is known to [facility] for home PT s/p recent DC home from [facility] rehab. Pt has support from son in community, message left for Jose Martin Davidson for follow up and pt is awaiting PT eval to determine appropriate transitional planning. SW to continue to follow.

## 2024-04-05 NOTE — H&P ADULT - PROBLEM SELECTOR PLAN 2
- WBC 14.16, UA moderate leuk esterase, positive nitrite  - hx/o recurrent UTIs: 2/10/2024 >100,000 CFU/ml Pseudomonas aeruginosa, 2/1/2024 >100,000 CFU/ml Proteus mirabilis -- both susceptible to cefepime  - f/u UCx  - start IV cefepime pending ID recs  - ID consulted - WBC 14.16, UA moderate leuk esterase, positive nitrite  - hx/o recurrent UTIs: 2/10/2024 >100,000 CFU/ml Pseudomonas aeruginosa, 2/1/2024 >100,000 CFU/ml Proteus mirabilis -- both susceptible to cefepime  - f/u UCx  - ID consulted

## 2024-04-05 NOTE — H&P ADULT - HISTORY OF PRESENT ILLNESS
89yo M PMHx HTN, hypothyroidism, HLD, chronic back pain 2/2 spinal stenosis, chronic indwelling Neumann brought in by EMS for mechanical fall. Pt states that he while he was getting up from his chair in an attempt to switch his Neumann bag, he slipped and landed on his buttocks. Denies head strike, LOC, syncope. States that he did not want to be brought to the hospital. Recently admitted Feb 2024 for falls, d/c to [facility]. Denies CP, HA, dizziness, vision changes, N/V/D/C.    IN THE ED:  Temp 97.2  F ,  , /82  ,RR 20 , SpO2 97  S/P 500cc NS bolus  Labs significant for WBC 14.16, H/H 10.5/30, Na 130, Alk phos 151, UA moderate leuk esterase, positive nitrite  Imaging:   CT Head:  1.  No CT evidence of acute intracranial pathology.  2.  Bony erosive changes involving the posterior superior aspect the left   mastoid air cells and left squamous temporal calvarium, measuring   approximately 2.8 x 1.4 x 1.4 cm, appears grossly stable dating back to   MRI of 07/05/2012.    CT Cervical Spine:  1.  No CT evidence of cervical spine fracture, traumatic malalignment or   suspicious osseous lesion.  2.  Mild cervical spine degenerative changes.  3.  Partially imaged fluid around the left glenohumeral joint, consistent   with joint effusion and/or bursitis.    CT AP  Acute displaced left posterior 11th and 12th rib fractures. No   pneumothorax.    Scattered tree-in bud opacities at the lung bases in the lower lobes,   lingula, and right middle lobe likely reflective of a bronchiolitis.    Mild smooth interlobular septal thickening suggestive of mild  interstitial edema. Small bilateral pleural effusions.    No acute intra-abdominal or pelvic findings on unenhanced CT of the   abdomen and pelvis. Lack of intravenous contrast limits evaluation of the   solid abdominal organs and vasculature.   89yo M PMHx HTN, hypothyroidism, HLD, chronic back pain 2/2 spinal stenosis, chronic indwelling Neumann brought in by EMS for mechanical fall. Pt states that he while he was getting up from his chair in an attempt to switch his Neumann bag, he slipped and landed on his buttocks. He was wearing a pendant that alerted EMS that he fell. Denies head strike, LOC, syncope. States that he did not want to be brought to the hospital. Taking baby aspirin as prescribed by his Cardiologist. Recently admitted Feb 2024 for falls, d/c to [facility]. Denies CP, HA, dizziness, vision changes, N/V/D/C.    IN THE ED:  Temp 97.2  F ,  , /82  ,RR 20 , SpO2 97  S/P 500cc NS bolus  Labs significant for WBC 14.16, H/H 10.5/30, Na 130, Alk phos 151, UA moderate leuk esterase, positive nitrite  Imaging:   CT Head:  1.  No CT evidence of acute intracranial pathology.  2.  Bony erosive changes involving the posterior superior aspect the left   mastoid air cells and left squamous temporal calvarium, measuring   approximately 2.8 x 1.4 x 1.4 cm, appears grossly stable dating back to   MRI of 07/05/2012.    CT Cervical Spine:  1.  No CT evidence of cervical spine fracture, traumatic malalignment or   suspicious osseous lesion.  2.  Mild cervical spine degenerative changes.  3.  Partially imaged fluid around the left glenohumeral joint, consistent   with joint effusion and/or bursitis.    CT AP  Acute displaced left posterior 11th and 12th rib fractures. No   pneumothorax.    Scattered tree-in bud opacities at the lung bases in the lower lobes,   lingula, and right middle lobe likely reflective of a bronchiolitis.    Mild smooth interlobular septal thickening suggestive of mild  interstitial edema. Small bilateral pleural effusions.    No acute intra-abdominal or pelvic findings on unenhanced CT of the   abdomen and pelvis. Lack of intravenous contrast limits evaluation of the   solid abdominal organs and vasculature.

## 2024-04-05 NOTE — SOCIAL WORK PROGRESS NOTE - NSSWPROGRESSNOTE_GEN_ALL_CORE
SW met with pt/son, amenable to subacute rehab as per PTE. List of facility choices given and pt prefers [facility] as first choice and will provide additional facility choices to SW. ELIAS requested and TEDDY will follow up with subacute rehab referral, auth needed from [employer] for DC to subacute rehab.

## 2024-04-05 NOTE — OCCUPATIONAL THERAPY INITIAL EVALUATION ADULT - ADDITIONAL COMMENTS
Pt lives alone in a private home with 4 steps to enter (+) rail. There are 2 steps down to his den. Pt reports he has a tub with TTB. He was recently d/c from [facility] and receiving HCPT 2x/wk. Pt reports he was independent with ADLs and functional mobility using RW. Pt has a chronic stewart. He had HHA 2x/wk who assisted with IADLs.

## 2024-04-05 NOTE — ED ADULT NURSE NOTE - OBJECTIVE STATEMENT
Pt arrived to ED brought by EMS for multiple falls tonight. States he was attempting to get out of bed to change his stewart bag to nighttime bag and fell. EMS assisted pt back to bed and was called soon after for another fall. Denies LOC/CP/SOB/N/V/D/f/c. Denies head strike. NAD at this time. Labs sent, fluids given, CT done. Awaiting dispo.

## 2024-04-05 NOTE — ED PROVIDER NOTE - OBJECTIVE STATEMENT
89-year-old male history of hypertension hypothyroid with chronic indwelling Neumann catheter brought in by EMS status post multiple fall tonight.  Patient states that he lost his balance and fell the first time and then afterwards he slipped on water and fell again denies any head injury.  Normally uses a walker at baseline.  Has some chronic low back pain.  Currently on a baby aspirin.  Denies any chest pain shortness of breath abdominal pain.  Denies any joint pain.

## 2024-04-05 NOTE — ED ADULT TRIAGE NOTE - AS PAIN REST
3 (mild pain) Cimzia Counseling:  I discussed with the patient the risks of Cimzia including but not limited to immunosuppression, allergic reactions and infections.  The patient understands that monitoring is required including a PPD at baseline and must alert us or the primary physician if symptoms of infection or other concerning signs are noted.

## 2024-04-05 NOTE — PHYSICAL THERAPY INITIAL EVALUATION ADULT - PERTINENT HX OF CURRENT PROBLEM, REHAB EVAL
Per H&P, pt is a 89 year old Male PMHx HTN, hypothyroidism, HLD, chronic back pain 2/2 spinal stenosis, chronic indwelling Neumann brought in by EMS for mechanical fall. Pt states that he while he was getting up from his chair in an attempt to switch his Neumann bag, he slipped and landed on his buttocks. He was wearing a pendant that alerted EMS that he fell. Denies head strike, LOC, syncope. States that he did not want to be brought to the hospital. Taking baby aspirin as prescribed by his Cardiologist. Recently admitted Feb 2024 for falls, d/c to [facility].    CT Chest: +Acute displaced left posterior 11th and 12th rib fractures.

## 2024-04-05 NOTE — ED PROVIDER NOTE - CLINICAL SUMMARY MEDICAL DECISION MAKING FREE TEXT BOX
89-year-old male history of hypertension hypothyroid with chronic indwelling Neumann catheter brought in by EMS status post multiple fall tonight.  Patient states that he lost his balance and fell the first time and then afterwards he slipped on water and fell again denies any head injury.  Normally uses a walker at baseline.  Has some chronic low back pain.  Currently on a baby aspirin.  Denies any chest pain shortness of breath abdominal pain.  Denies any joint pain.    r/o ich r/o fx r/o infection, labs, CT

## 2024-04-05 NOTE — ED PROVIDER NOTE - CARE PLAN
1 Principal Discharge DX:	Frequent falls  Secondary Diagnosis:	Weakness   Principal Discharge DX:	Frequent falls  Secondary Diagnosis:	Weakness  Secondary Diagnosis:	Closed fracture of two ribs of left side

## 2024-04-05 NOTE — H&P ADULT - NSHPOUTPATIENTPROVIDERS_GEN_ALL_CORE
PCP: Dr. Umesh Farr  Uro: Dr. Carl Ambriz PCP: Dr. Umesh Farr  Uro: Dr. Carl Ambriz  Cardio Dr. Mcneal

## 2024-04-05 NOTE — H&P ADULT - ATTENDING COMMENTS
89yo M PMHx HTN, hypothyroidism, HLD, chronic back pain 2/2 spinal stenosis, chronic indwelling Neumann brought in by EMS for mechanical fall. Admitted for sepsis, UTI, displaced rib fx.     HPI as above.     T(C): 36.4 (04-05-24 @ 08:03), Max: 36.4 (04-05-24 @ 06:10)  HR: 91 (04-05-24 @ 08:03) (75 - 102)  BP: 140/70 (04-05-24 @ 08:03) (136/64 - 160/82)  RR: 18 (04-05-24 @ 08:03) (18 - 20)  SpO2: 98% (04-05-24 @ 08:03) (97% - 98%)  Wt(kg): --    Physical Exam:   GENERAL: well-groomed, NAD  HEENT: head NC/AT; EOM intact, PERRLA, conjunctiva & sclera clear; moist mucous membranes  NECK: supple, no JVD  RESPIRATORY: CTA B/L, no wheezing, rales, rhonchi or rubs  CARDIOVASCULAR: S1&S2, RRR  ABDOMEN: soft, non-tender, non-distended, + Bowel sounds x4 quadrants, no guarding, rebound or rigidity  MUSCULOSKELETAL: b/l LE edema  LYMPH: no cervical lymphadenopathy  VASCULAR: Radial pulses 2+ bilaterally, no varicose veins   SKIN: warm and dry, color normal  NEUROLOGIC: AA&O X3, CN2-12 intact w/ no focal deficits, no sensory loss, MAEx4  Psych: Normal mood and affect, normal behavior    Plan:  Treat for UTI  start cefepime  pulm consult for rib fx. pain control as ordered. incentive spirometry. CT surg consult  monitor pulse ox  continue lasix. f/u lactic acid    remainder as above

## 2024-04-05 NOTE — PHYSICAL THERAPY INITIAL EVALUATION ADULT - BED MOBILITY TRAINING, PT EVAL
Patient will perform supine<->sit with MinAx1 by 2 weeks to allow patient to get in/out of bed safely.

## 2024-04-05 NOTE — CONSULT NOTE ADULT - SUBJECTIVE AND OBJECTIVE BOX
Date/Time Patient Seen:  		  Referring MD:   Data Reviewed	       Patient is a 89y old  Male who presents with a chief complaint of     Subjective/HPI   · Chief Complaint: The patient is a 89y Male complaining of weakness.  · HPI Objective Statement: 89-year-old male history of hypertension hypothyroid with chronic indwelling Neumann catheter brought in by EMS status post multiple fall tonight.  Patient states that he lost his balance and fell the first time and then afterwards he slipped on water and fell again denies any head injury.  Normally uses a walker at baseline.  Has some chronic low back pain.  Currently on a baby aspirin.  Denies any chest pain shortness of breath abdominal pain.  Denies any joint pain.    PAST MEDICAL & SURGICAL HISTORY:  HTN (hypertension)    High cholesterol    Spinal stenosis    Hypothyroidism    Insomnia    No significant past surgical history    S/P cholecystectomy    H/O inguinal hernia repair    S/P cardiac cath    PAST MEDICAL HISTORY:  High cholesterol     HTN (hypertension)     Hypothyroidism     Insomnia     Spinal stenosis.     PAST SURGICAL HISTORY:  H/O inguinal hernia repair     S/P cardiac cath     S/P cholecystectomy.     FAMILY HISTORY:  No pertinent family history in first degree relatives. No pertinent family history of: n/a.     Tobacco Usage:  · Tobacco Usage	Unknown if ever smoked    ALLERGIES AND HOME MEDICATIONS:   Allergies:        Allergies:  	phenobarbital: Drug, Unknown  	Augmentin: Drug, Unknown  	Nembutal: Drug, Unknown  	Dilantin: Drug, Unknown    Home Medications:   * Incomplete Medication History as of 29-Mar-2024 00:06 documented in Structured Notes  · 	cefuroxime 500 mg oral tablet: 1 tab(s) orally every 12 hours last dose 2/14 PM  · 	ascorbic acid 500 mg oral tablet: 1 tab(s) orally 2 times a day  · 	Multiple Vitamins oral tablet: 1 tab(s) orally once a day  · 	enoxaparin: 40 milligram(s) subcutaneous once a day while in rehabilitation facility  · 	amLODIPine 2.5 mg oral tablet: 1 tab(s) orally once a day (at bedtime)  · 	atenolol 25 mg oral tablet: 1 tab(s) orally once a day  · 	allopurinol 300 mg oral tablet: 1 tab(s) orally once a day  · 	furosemide 20 mg oral tablet: 1 tab(s) orally once a day  · 	ALPRAZolam 0.25 mg oral tablet: 2 tab(s) orally 2 times a day as needed for  insomnia  · 	levothyroxine 112 mcg (0.112 mg) oral tablet: 1 tab(s) orally once a day  · 	pentoxifylline 400 mg oral tablet, extended release: 1 tab(s) orally 3 times a day with meals  · 	finasteride 5 mg oral tablet: 1 tab(s) orally once a day  · 	amLODIPine 5 mg oral tablet: 1 tab(s) orally once a day (in the morning)  · 	simvastatin 5 mg oral tablet: 1 tab(s) orally once a day (at bedtime)  · 	mirtazapine 15 mg oral tablet: 1 tab(s) orally once a day (at bedtime)  · 	pantoprazole 40 mg oral delayed release tablet: 1 tab(s) orally once a day      Medication list         MEDICATIONS  (STANDING):  allopurinol 300 milliGRAM(s) Oral daily  amLODIPine   Tablet 2.5 milliGRAM(s) Oral <User Schedule>  atenolol  Tablet 25 milliGRAM(s) Oral daily  enoxaparin Injectable 40 milliGRAM(s) SubCutaneous every 24 hours  finasteride 5 milliGRAM(s) Oral daily  furosemide    Tablet 40 milliGRAM(s) Oral daily  levothyroxine 112 MICROGram(s) Oral daily  mirtazapine 15 milliGRAM(s) Oral at bedtime  multivitamin 1 Tablet(s) Oral daily  pantoprazole    Tablet 40 milliGRAM(s) Oral before breakfast  pentoxifylline 400 milliGRAM(s) Oral three times a day  simvastatin 5 milliGRAM(s) Oral at bedtime  sodium chloride 0.9% Bolus 1000 milliLiter(s) IV Bolus once    MEDICATIONS  (PRN):         Vitals log        ICU Vital Signs Last 24 Hrs  T(C): 36.4 (05 Apr 2024 06:10), Max: 36.4 (05 Apr 2024 06:10)  T(F): 97.5 (05 Apr 2024 06:10), Max: 97.5 (05 Apr 2024 06:10)  HR: 75 (05 Apr 2024 06:10) (75 - 102)  BP: 136/64 (05 Apr 2024 06:10) (136/64 - 160/82)  BP(mean): --  ABP: --  ABP(mean): --  RR: 19 (05 Apr 2024 06:10) (19 - 20)  SpO2: 98% (05 Apr 2024 06:10) (97% - 98%)    O2 Parameters below as of 05 Apr 2024 06:10  Patient On (Oxygen Delivery Method): room air                 Input and Output:  I&O's Detail      Lab Data                        10.5   14.16 )-----------( 218      ( 05 Apr 2024 02:35 )             30.0     04-05    130<L>  |  91<L>  |  26<H>  ----------------------------<  123<H>  4.0   |  27  |  1.10    Ca    9.3      05 Apr 2024 02:35    TPro  7.3  /  Alb  3.4  /  TBili  1.1  /  DBili  x   /  AST  33  /  ALT  24  /  AlkPhos  151<H>  04-05      Social History:  · Substance use	No  · Social History (marital status, living situation, occupation, and sexual history)	Denies tobacco, alcohol, and illicit drug use   Lives at home alone, uses walker to ambulate, independent ADLs  No dietary restrictions        Review of Systems	  fall  pain  weakness  ataxic gait      Objective     Physical Examination    heart s1s2  lung dc bS  head nc  verbal  frail  weak  elderly      Pertinent Lab findings & Imaging      Neumann:  NO   Adequate UO     I&O's Detail           Discussed with:     Cultures:	        Radiology      ACC: [id number] EXAM:  CT ABDOMEN AND PELVIS   ORDERED BY: JAYLEEN GRAY     ACC: [id number] EXAM:  CT CHEST   ORDERED BY: JAYLEEN GRAY     PROCEDURE DATE:  04/05/2024          INTERPRETATION:  CLINICAL INFORMATION: Status post fall.    COMPARISON: 2/10/2024    CONTRAST/COMPLICATIONS:  IV Contrast: NONE  Oral Contrast: NONE  Complications: None reported at time of study completion    PROCEDURE:  CT of the Chest, Abdomen and Pelvis was performed.  Sagittal and coronal reformats were performed.    FINDINGS:    Evaluation is degraded by motion and patient positioning with arms over   chest and abdomen.    CHEST:  LUNGS AND LARGE AIRWAYS: Patent central airways. Scattered tree-in bud   opacities at the lung bases in the lower lobes, lingula, and right middle   lobe. Mild smooth interlobular septal thickening at the lung bases.  PLEURA: Small bilateral pleural effusions. No pneumothorax.  VESSELS: Main pulmonary artery is not dilated. Thoracic aorta is normal   in caliber. Atherosclerotic calcifications of the thoracic aorta, great   vessels, and coronary arteries.  HEART: Heart is enlarged. No pericardial effusion. Calcifications of the   aortic valve leaflets and mitral annulus.  MEDIASTINUM AND GONSALO: No lymphadenopathy.  CHEST WALL AND LOWER NECK: Within normal limits.    ABDOMEN AND PELVIS:  LIVER: Within normal limits.  BILE DUCTS: Normal caliber.  GALLBLADDER: Not visualized.  SPLEEN: Within normal limits.  PANCREAS: Within normal limits.  ADRENALS: Within normal limits.  KIDNEYS/URETERS: No right or left hydroureteronephrosis or obstructing   urinary tract calculus. Unchanged left renal cortical calcification   measuring up to 1.2 cm.    BLADDER: Neumann catheter. Bladder calculi. Thickening of the urinary   bladder walls despite underdistention.  REPRODUCTIVE ORGANS: Mildly enlarged prostate gland.    BOWEL: No bowel obstruction. Appendix is not visualized. Colonic   diverticulosis without evidence of diverticulitis.  PERITONEUM: No ascites, pneumoperitoneum, or hemoperitoneum. No   mesenteric lymphadenopathy.  VESSELS: Atherosclerotic calcifications of the aortoiliac tree. Normal   caliber abdominal aorta.  RETROPERITONEUM/LYMPH NODES: No lymphadenopathy. No retroperitoneal   hematoma.  ABDOMINAL WALL: Mild generalized soft tissue edema.  BONES: Acute displaced left posterior 11th and 12th rib fractures.   Degenerative changes of the spine, hips, and glenohumeral joints.   Unchanged sclerotic lesion in the right iliac wing.    IMPRESSION:  Acute displaced left posterior 11th and 12th rib fractures. No   pneumothorax.    Scattered tree-in bud opacities at the lung bases in the lower lobes,   lingula, and right middle lobe likely reflective of a bronchiolitis.    Mild smooth interlobular septal thickening suggestive of mild   interstitial edema. Small bilateral pleural effusions.    No acute intra-abdominal or pelvic findings on unenhanced CT of the   abdomen and pelvis. Lack of intravenous contrast limits evaluation of the   solid abdominal organs and vasculature.        --- End of Report ---            JASWANT ANGEL DO; Attending Radiologist  This document has been electronically signed. Apr 5 2024  4:49AM

## 2024-04-05 NOTE — CONSULT NOTE ADULT - SUBJECTIVE AND OBJECTIVE BOX
Surgery Consultation    89yo M PMHx HTN, hypothyroidism, HLD, chronic back pain 2/2 spinal stenosis, chronic indwelling Neumann brought in by EMS for mechanical fall. Pt states that he while he was getting up from his chair in an attempt to switch his Neumann bag, he slipped and landed on his buttocks. He was wearing a pendant that alerted EMS that he fell. Denies head strike, LOC, syncope. States that he did not want to be brought to the hospital. Taking baby aspirin as prescribed by his Cardiologist. Recently admitted 2024 for falls, d/c to [facility]. Denies CP, HA, dizziness, vision changes, N/V/D/C.    IN THE ED:  Temp 97.2  F ,  , /82  ,RR 20 , SpO2 97  S/P 500cc NS bolus  Labs significant for WBC 14.16, H/H 10.5/30, Na 130, Alk phos 151, UA moderate leuk esterase, positive nitrite      Interval/Thoracic:        PAST MEDICAL & SURGICAL HISTORY:  HTN (hypertension)      High cholesterol      Spinal stenosis      Hypothyroidism      Insomnia      S/P cholecystectomy      H/O inguinal hernia repair      S/P cardiac cath        Allergies:  Dilantin (Unknown)  Augmentin (Unknown)  phenobarbital (Unknown)  Nembutal (Unknown)    Home Medications:  allopurinol 300 mg oral tablet: 1 tab(s) orally once a day  ALPRAZolam 0.25 mg oral tablet: 2 tab(s) orally 2 times a day as needed for  insomnia  amLODIPine 2.5 mg oral tablet: 1 tab(s) orally 2 times a day  aspirin 81 mg oral tablet: orally once a day  atenolol 25 mg oral tablet: 1 tab(s) orally once a day  enoxaparin: 40 milligram(s) subcutaneous once a day while in rehabilitation facility  finasteride 5 mg oral tablet: 1 tab(s) orally once a day  furosemide 20 mg oral tablet: 2 tab(s) orally once a day  levothyroxine 112 mcg (0.112 mg) oral tablet: 1 tab(s) orally once a day  mirtazapine 15 mg oral tablet: 1 tab(s) orally once a day (at bedtime)  Multiple Vitamins oral tablet: 1 tab(s) orally once a day  pantoprazole 40 mg oral delayed release tablet: 1 tab(s) orally once a day  pentoxifylline 400 mg oral tablet, extended release: 1 tab(s) orally 3 times a day with meals  simvastatin 5 mg oral tablet: 1 tab(s) orally once a day (at bedtime)      Family History:  FAMILY HISTORY:  No pertinent family history in first degree relatives        ROS:  Constitutional: Denies fever, fatigue or weight loss.  Skin: Denies rash.  Eyes: Denies recent vision problems or eye pain.  ENT: Denies congestion, ear pain, or sore throat.  Endocrine: Denies thyroid problems.  Cardiovascular: Denies chest pain or palpation.  Respiratory: SEE HPI  Gastrointestinal: Denies abdominal pain, nausea, vomiting, or diarrhea.  Genitourinary: Denies dysuria.  Musculoskeletal: Denies joint swelling.  Neurologic: Denies headache.      PHYSICAL EXAM:  GENERAL: No acute distress, well-developed  HEAD:  Atraumatic, Normocephalic  CHEST:   NEUROLOGY: responding appropriately, no focal deficits    Data:  T(C): 36.6 (24 @ 12:10), Max: 36.6 (24 @ 12:10)  HR: 72 (24 @ 12:10) (72 - 102)  BP: 144/85 (24 @ 12:10) (136/64 - 160/82)  RR: 18 (24 @ 12:10) (18 - 20)  SpO2: 98% (24 @ 12:10) (97% - 98%)                        10.5   14.16 )-----------( 218      ( 2024 02:35 )             30.0     04-05    130<L>  |  91<L>  |  26<H>  ----------------------------<  123<H>  4.0   |  27  |  1.10    Ca    9.3      2024 02:35    TPro  7.3  /  Alb  3.4  /  TBili  1.1  /  DBili  x   /  AST  33  /  ALT  24  /  AlkPhos  151<H>  04-05      LIVER FUNCTIONS - ( 2024 02:35 )  Alb: 3.4 g/dL / Pro: 7.3 g/dL / ALK PHOS: 151 U/L / ALT: 24 U/L / AST: 33 U/L / GGT: x           Urinalysis Basic - ( 2024 05:24 )    Color: Yellow / Appearance: Clear / S.006 / pH: x  Gluc: x / Ketone: Trace mg/dL  / Bili: Negative / Urobili: 0.2 mg/dL   Blood: x / Protein: Negative mg/dL / Nitrite: Positive   Leuk Esterase: Moderate / RBC: 0 /HPF / WBC 5 /HPF   Sq Epi: x / Non Sq Epi: x / Bacteria: Moderate /HPF      Radiology:  Imaging:   CT Head:  1.  No CT evidence of acute intracranial pathology.  2.  Bony erosive changes involving the posterior superior aspect the left   mastoid air cells and left squamous temporal calvarium, measuring   approximately 2.8 x 1.4 x 1.4 cm, appears grossly stable dating back to   MRI of 2012.    CT Cervical Spine:  1.  No CT evidence of cervical spine fracture, traumatic malalignment or   suspicious osseous lesion.  2.  Mild cervical spine degenerative changes.  3.  Partially imaged fluid around the left glenohumeral joint, consistent   with joint effusion and/or bursitis.    CT AP  Acute displaced left posterior 11th and 12th rib fractures. No   pneumothorax.    Scattered tree-in bud opacities at the lung bases in the lower lobes,   lingula, and right middle lobe likely reflective of a bronchiolitis.    Mild smooth interlobular septal thickening suggestive of mild  interstitial edema. Small bilateral pleural effusions.    No acute intra-abdominal or pelvic findings on unenhanced CT of the   abdomen and pelvis. Lack of intravenous contrast limits evaluation of the   solid abdominal organs and vasculature.    Assessment:     89yo M PMHx HTN, hypothyroidism, HLD, chronic back pain 2/2 spinal stenosis, chronic indwelling Neumann brought in by EMS for mechanical fall. Admitted for sepsis, UTI, displaced rib fx.    Plan:  -Discussed with Dr. Sousa  -Manage conservatively with pain control  -no intervention  -signing off reconsult PRN  -Care per primary team    Surgical Team Contact Information  Spectralink: Ext: [phone] or [phone]   Surgery Consultation    89yo M PMHx HTN, hypothyroidism, HLD, chronic back pain 2/2 spinal stenosis, chronic indwelling Neumann brought in by EMS for mechanical fall. Pt states that he while he was getting up from his chair in an attempt to switch his Neumann bag, he slipped and landed on his buttocks. He was wearing a pendant that alerted EMS that he fell. Denies head strike, LOC, syncope. States that he did not want to be brought to the hospital. Taking baby aspirin as prescribed by his Cardiologist. Recently admitted 2024 for falls, d/c to [facility]. Denies CP, HA, dizziness, vision changes, N/V/D/C.    IN THE ED:  Temp 97.2  F ,  , /82  ,RR 20 , SpO2 97  S/P 500cc NS bolus  Labs significant for WBC 14.16, H/H 10.5/30, Na 130, Alk phos 151, UA moderate leuk esterase, positive nitrite      Interval/Thoracic:  Pt denies any chest pain a this time. Denies difficulty with inspiration, shortness of breath.        PAST MEDICAL & SURGICAL HISTORY:  HTN (hypertension)      High cholesterol      Spinal stenosis      Hypothyroidism      Insomnia      S/P cholecystectomy      H/O inguinal hernia repair      S/P cardiac cath        Allergies:  Dilantin (Unknown)  Augmentin (Unknown)  phenobarbital (Unknown)  Nembutal (Unknown)    Home Medications:  allopurinol 300 mg oral tablet: 1 tab(s) orally once a day  ALPRAZolam 0.25 mg oral tablet: 2 tab(s) orally 2 times a day as needed for  insomnia  amLODIPine 2.5 mg oral tablet: 1 tab(s) orally 2 times a day  aspirin 81 mg oral tablet: orally once a day  atenolol 25 mg oral tablet: 1 tab(s) orally once a day  enoxaparin: 40 milligram(s) subcutaneous once a day while in rehabilitation facility  finasteride 5 mg oral tablet: 1 tab(s) orally once a day  furosemide 20 mg oral tablet: 2 tab(s) orally once a day  levothyroxine 112 mcg (0.112 mg) oral tablet: 1 tab(s) orally once a day  mirtazapine 15 mg oral tablet: 1 tab(s) orally once a day (at bedtime)  Multiple Vitamins oral tablet: 1 tab(s) orally once a day  pantoprazole 40 mg oral delayed release tablet: 1 tab(s) orally once a day  pentoxifylline 400 mg oral tablet, extended release: 1 tab(s) orally 3 times a day with meals  simvastatin 5 mg oral tablet: 1 tab(s) orally once a day (at bedtime)      Family History:  FAMILY HISTORY:  No pertinent family history in first degree relatives        ROS:  Constitutional: Denies fever, fatigue or weight loss.  Skin: Denies rash.  Eyes: Denies recent vision problems or eye pain.  ENT: Denies congestion, ear pain, or sore throat.  Endocrine: Denies thyroid problems.  Cardiovascular: Denies chest pain or palpation.  Respiratory: SEE HPI  Gastrointestinal: Denies abdominal pain, nausea, vomiting, or diarrhea.  Genitourinary: Denies dysuria.  Musculoskeletal: Denies joint swelling.  Neurologic: Denies headache.      PHYSICAL EXAM:  GENERAL: No acute distress, well-developed  HEAD:  Atraumatic, Normocephalic  CHEST: equal rise and fall of chest b/l. No signs of ecchymosis b/l. no tenderness b/l.  NEUROLOGY: responding appropriately, no focal deficits    Data:  T(C): 36.6 (24 @ 12:10), Max: 36.6 (24 @ 12:10)  HR: 72 (24 @ 12:10) (72 - 102)  BP: 144/85 (24 @ 12:10) (136/64 - 160/82)  RR: 18 (24 @ 12:10) (18 - 20)  SpO2: 98% (24 @ 12:10) (97% - 98%)                        10.5   14.16 )-----------( 218      ( 2024 02:35 )             30.0     04-05    130<L>  |  91<L>  |  26<H>  ----------------------------<  123<H>  4.0   |  27  |  1.10    Ca    9.3      2024 02:35    TPro  7.3  /  Alb  3.4  /  TBili  1.1  /  DBili  x   /  AST  33  /  ALT  24  /  AlkPhos  151<H>  04-05      LIVER FUNCTIONS - ( 2024 02:35 )  Alb: 3.4 g/dL / Pro: 7.3 g/dL / ALK PHOS: 151 U/L / ALT: 24 U/L / AST: 33 U/L / GGT: x           Urinalysis Basic - ( 2024 05:24 )    Color: Yellow / Appearance: Clear / S.006 / pH: x  Gluc: x / Ketone: Trace mg/dL  / Bili: Negative / Urobili: 0.2 mg/dL   Blood: x / Protein: Negative mg/dL / Nitrite: Positive   Leuk Esterase: Moderate / RBC: 0 /HPF / WBC 5 /HPF   Sq Epi: x / Non Sq Epi: x / Bacteria: Moderate /HPF      Radiology:  Imaging:   CT Head:  1.  No CT evidence of acute intracranial pathology.  2.  Bony erosive changes involving the posterior superior aspect the left   mastoid air cells and left squamous temporal calvarium, measuring   approximately 2.8 x 1.4 x 1.4 cm, appears grossly stable dating back to   MRI of 2012.    CT Cervical Spine:  1.  No CT evidence of cervical spine fracture, traumatic malalignment or   suspicious osseous lesion.  2.  Mild cervical spine degenerative changes.  3.  Partially imaged fluid around the left glenohumeral joint, consistent   with joint effusion and/or bursitis.    CT AP  Acute displaced left posterior 11th and 12th rib fractures. No   pneumothorax.    Scattered tree-in bud opacities at the lung bases in the lower lobes,   lingula, and right middle lobe likely reflective of a bronchiolitis.    Mild smooth interlobular septal thickening suggestive of mild  interstitial edema. Small bilateral pleural effusions.    No acute intra-abdominal or pelvic findings on unenhanced CT of the   abdomen and pelvis. Lack of intravenous contrast limits evaluation of the   solid abdominal organs and vasculature.    Assessment:     89yo M PMHx HTN, hypothyroidism, HLD, chronic back pain 2/2 spinal stenosis, chronic indwelling Neumann brought in by EMS for mechanical fall. Admitted for sepsis, UTI, displaced rib fx.    Plan:  -Discussed with Dr. Sousa  -Manage conservatively with pain control  -no intervention  -signing off reconsult PRN  -Care per primary team    Surgical Team Contact Information  Spectralink: Ext: [phone] or [phone]

## 2024-04-06 LAB
ALBUMIN SERPL ELPH-MCNC: 2.5 G/DL — LOW (ref 3.3–5)
ALP SERPL-CCNC: 110 U/L — SIGNIFICANT CHANGE UP (ref 40–120)
ALT FLD-CCNC: 17 U/L — SIGNIFICANT CHANGE UP (ref 12–78)
ANION GAP SERPL CALC-SCNC: 11 MMOL/L — SIGNIFICANT CHANGE UP (ref 5–17)
ANION GAP SERPL CALC-SCNC: 9 MMOL/L — SIGNIFICANT CHANGE UP (ref 5–17)
AST SERPL-CCNC: 20 U/L — SIGNIFICANT CHANGE UP (ref 15–37)
BASOPHILS # BLD AUTO: 0.04 K/UL — SIGNIFICANT CHANGE UP (ref 0–0.2)
BASOPHILS NFR BLD AUTO: 0.5 % — SIGNIFICANT CHANGE UP (ref 0–2)
BILIRUB SERPL-MCNC: 0.6 MG/DL — SIGNIFICANT CHANGE UP (ref 0.2–1.2)
BUN SERPL-MCNC: 16 MG/DL — SIGNIFICANT CHANGE UP (ref 7–23)
BUN SERPL-MCNC: 18 MG/DL — SIGNIFICANT CHANGE UP (ref 7–23)
CALCIUM SERPL-MCNC: 7.7 MG/DL — LOW (ref 8.5–10.1)
CALCIUM SERPL-MCNC: 7.7 MG/DL — LOW (ref 8.5–10.1)
CHLORIDE SERPL-SCNC: 94 MMOL/L — LOW (ref 96–108)
CHLORIDE SERPL-SCNC: 94 MMOL/L — LOW (ref 96–108)
CO2 SERPL-SCNC: 27 MMOL/L — SIGNIFICANT CHANGE UP (ref 22–31)
CO2 SERPL-SCNC: 27 MMOL/L — SIGNIFICANT CHANGE UP (ref 22–31)
CREAT SERPL-MCNC: 0.74 MG/DL — SIGNIFICANT CHANGE UP (ref 0.5–1.3)
CREAT SERPL-MCNC: 0.99 MG/DL — SIGNIFICANT CHANGE UP (ref 0.5–1.3)
EGFR: 69 ML/MIN/1.73M2 — SIGNIFICANT CHANGE UP
EGFR: 82 ML/MIN/1.73M2 — SIGNIFICANT CHANGE UP
EOSINOPHIL # BLD AUTO: 0.18 K/UL — SIGNIFICANT CHANGE UP (ref 0–0.5)
EOSINOPHIL NFR BLD AUTO: 2.2 % — SIGNIFICANT CHANGE UP (ref 0–6)
GLUCOSE SERPL-MCNC: 106 MG/DL — HIGH (ref 70–99)
GLUCOSE SERPL-MCNC: 121 MG/DL — HIGH (ref 70–99)
HCT VFR BLD CALC: 26.1 % — LOW (ref 39–50)
HGB BLD-MCNC: 9.1 G/DL — LOW (ref 13–17)
IMM GRANULOCYTES NFR BLD AUTO: 0.9 % — SIGNIFICANT CHANGE UP (ref 0–0.9)
LYMPHOCYTES # BLD AUTO: 1.02 K/UL — SIGNIFICANT CHANGE UP (ref 1–3.3)
LYMPHOCYTES # BLD AUTO: 12.5 % — LOW (ref 13–44)
MCHC RBC-ENTMCNC: 30.2 PG — SIGNIFICANT CHANGE UP (ref 27–34)
MCHC RBC-ENTMCNC: 34.9 GM/DL — SIGNIFICANT CHANGE UP (ref 32–36)
MCV RBC AUTO: 86.7 FL — SIGNIFICANT CHANGE UP (ref 80–100)
MONOCYTES # BLD AUTO: 0.67 K/UL — SIGNIFICANT CHANGE UP (ref 0–0.9)
MONOCYTES NFR BLD AUTO: 8.2 % — SIGNIFICANT CHANGE UP (ref 2–14)
NEUTROPHILS # BLD AUTO: 6.18 K/UL — SIGNIFICANT CHANGE UP (ref 1.8–7.4)
NEUTROPHILS NFR BLD AUTO: 75.7 % — SIGNIFICANT CHANGE UP (ref 43–77)
NRBC # BLD: 0 /100 WBCS — SIGNIFICANT CHANGE UP (ref 0–0)
PLATELET # BLD AUTO: 175 K/UL — SIGNIFICANT CHANGE UP (ref 150–400)
POTASSIUM SERPL-MCNC: 2.7 MMOL/L — CRITICAL LOW (ref 3.5–5.3)
POTASSIUM SERPL-MCNC: 3.7 MMOL/L — SIGNIFICANT CHANGE UP (ref 3.5–5.3)
POTASSIUM SERPL-SCNC: 2.7 MMOL/L — CRITICAL LOW (ref 3.5–5.3)
POTASSIUM SERPL-SCNC: 3.7 MMOL/L — SIGNIFICANT CHANGE UP (ref 3.5–5.3)
PROT SERPL-MCNC: 5.6 G/DL — LOW (ref 6–8.3)
RBC # BLD: 3.01 M/UL — LOW (ref 4.2–5.8)
RBC # FLD: 16.3 % — HIGH (ref 10.3–14.5)
SODIUM SERPL-SCNC: 130 MMOL/L — LOW (ref 135–145)
SODIUM SERPL-SCNC: 132 MMOL/L — LOW (ref 135–145)
WBC # BLD: 8.16 K/UL — SIGNIFICANT CHANGE UP (ref 3.8–10.5)
WBC # FLD AUTO: 8.16 K/UL — SIGNIFICANT CHANGE UP (ref 3.8–10.5)

## 2024-04-06 PROCEDURE — 99233 SBSQ HOSP IP/OBS HIGH 50: CPT

## 2024-04-06 RX ORDER — ALPRAZOLAM 0.25 MG
0.25 TABLET ORAL ONCE
Refills: 0 | Status: DISCONTINUED | OUTPATIENT
Start: 2024-04-06 | End: 2024-04-06

## 2024-04-06 RX ORDER — POTASSIUM CHLORIDE 20 MEQ
40 PACKET (EA) ORAL EVERY 4 HOURS
Refills: 0 | Status: COMPLETED | OUTPATIENT
Start: 2024-04-06 | End: 2024-04-06

## 2024-04-06 RX ADMIN — Medication 81 MILLIGRAM(S): at 11:14

## 2024-04-06 RX ADMIN — Medication 112 MICROGRAM(S): at 05:27

## 2024-04-06 RX ADMIN — LIDOCAINE 1 PATCH: 4 CREAM TOPICAL at 23:13

## 2024-04-06 RX ADMIN — SIMVASTATIN 5 MILLIGRAM(S): 20 TABLET, FILM COATED ORAL at 22:28

## 2024-04-06 RX ADMIN — Medication 40 MILLIEQUIVALENT(S): at 14:06

## 2024-04-06 RX ADMIN — Medication 400 MILLIGRAM(S): at 14:06

## 2024-04-06 RX ADMIN — LIDOCAINE 1 PATCH: 4 CREAM TOPICAL at 19:25

## 2024-04-06 RX ADMIN — Medication 400 MILLIGRAM(S): at 22:28

## 2024-04-06 RX ADMIN — Medication 400 MILLIGRAM(S): at 05:27

## 2024-04-06 RX ADMIN — Medication 300 MILLIGRAM(S): at 11:14

## 2024-04-06 RX ADMIN — PANTOPRAZOLE SODIUM 40 MILLIGRAM(S): 20 TABLET, DELAYED RELEASE ORAL at 07:23

## 2024-04-06 RX ADMIN — LIDOCAINE 1 PATCH: 4 CREAM TOPICAL at 11:13

## 2024-04-06 RX ADMIN — ATENOLOL 25 MILLIGRAM(S): 25 TABLET ORAL at 05:27

## 2024-04-06 RX ADMIN — Medication 1 TABLET(S): at 11:14

## 2024-04-06 RX ADMIN — Medication 40 MILLIEQUIVALENT(S): at 11:13

## 2024-04-06 RX ADMIN — ALBUTEROL 2.5 MILLIGRAM(S): 90 AEROSOL, METERED ORAL at 07:27

## 2024-04-06 RX ADMIN — ENOXAPARIN SODIUM 40 MILLIGRAM(S): 100 INJECTION SUBCUTANEOUS at 11:13

## 2024-04-06 RX ADMIN — Medication 0.25 MILLIGRAM(S): at 23:09

## 2024-04-06 RX ADMIN — MIRTAZAPINE 15 MILLIGRAM(S): 45 TABLET, ORALLY DISINTEGRATING ORAL at 22:28

## 2024-04-06 RX ADMIN — Medication 40 MILLIGRAM(S): at 05:27

## 2024-04-06 RX ADMIN — FINASTERIDE 5 MILLIGRAM(S): 5 TABLET, FILM COATED ORAL at 11:14

## 2024-04-06 RX ADMIN — AMLODIPINE BESYLATE 2.5 MILLIGRAM(S): 2.5 TABLET ORAL at 05:27

## 2024-04-06 RX ADMIN — ALBUTEROL 2.5 MILLIGRAM(S): 90 AEROSOL, METERED ORAL at 19:54

## 2024-04-06 NOTE — PROGRESS NOTE ADULT - ASSESSMENT
89-year-old male history of hypertension hypothyroid with chronic indwelling Neumann catheter brought in by EMS status post multiple fall     fall risk  ataxic gait  bronchiolitis  atelectasis  effusions  rib fx - 11 and 12 -   HTN  Hypothyroidism  BPH    ID eval noted - VS noted -   Albuterol BID nebs for mucociliary clearance  I aaron if able to cooperate  monitor VS and Sat  goal sat > 88 pct  pain rx regimen for Rib fx - topical - oral and systemic agents  PT eval   Bowel rx regimen  fall prec  assist with needs  GOC discussion  [facility] rx regimen - BP control - on Diuretic -   pt has chronic Neumann Cath  old records reviewed

## 2024-04-06 NOTE — PROGRESS NOTE ADULT - SUBJECTIVE AND OBJECTIVE BOX
Date/Time Patient Seen:  		  Referring MD:   Data Reviewed	       Patient is a 89y old  Male who presents with a chief complaint of Mechanical fall (05 Apr 2024 16:13)      Subjective/HPI     PAST MEDICAL & SURGICAL HISTORY:  HTN (hypertension)    High cholesterol    Spinal stenosis    Hypothyroidism    Insomnia    No significant past surgical history    S/P cholecystectomy    H/O inguinal hernia repair    S/P cardiac cath          Medication list         MEDICATIONS  (STANDING):  albuterol    0.083% 2.5 milliGRAM(s) Nebulizer every 12 hours  allopurinol 300 milliGRAM(s) Oral daily  amLODIPine   Tablet 2.5 milliGRAM(s) Oral <User Schedule>  aspirin enteric coated 81 milliGRAM(s) Oral daily  atenolol  Tablet 25 milliGRAM(s) Oral daily  enoxaparin Injectable 40 milliGRAM(s) SubCutaneous every 24 hours  finasteride 5 milliGRAM(s) Oral daily  furosemide    Tablet 40 milliGRAM(s) Oral daily  levothyroxine 112 MICROGram(s) Oral daily  lidocaine   4% Patch 1 Patch Transdermal daily  mirtazapine 15 milliGRAM(s) Oral at bedtime  multivitamin 1 Tablet(s) Oral daily  pantoprazole    Tablet 40 milliGRAM(s) Oral before breakfast  pentoxifylline 400 milliGRAM(s) Oral three times a day  simvastatin 5 milliGRAM(s) Oral at bedtime    MEDICATIONS  (PRN):  acetaminophen     Tablet .. 650 milliGRAM(s) Oral every 6 hours PRN Mild Pain (1 - 3)  bisacodyl 5 milliGRAM(s) Oral every 12 hours PRN Constipation         Vitals log        ICU Vital Signs Last 24 Hrs  T(C): 36.6 (06 Apr 2024 05:25), Max: 36.8 (05 Apr 2024 21:00)  T(F): 97.9 (06 Apr 2024 05:25), Max: 98.2 (05 Apr 2024 21:00)  HR: 74 (06 Apr 2024 05:25) (72 - 91)  BP: 131/68 (06 Apr 2024 05:25) (131/68 - 160/69)  BP(mean): --  ABP: --  ABP(mean): --  RR: 18 (06 Apr 2024 05:25) (18 - 19)  SpO2: 95% (06 Apr 2024 05:25) (95% - 98%)    O2 Parameters below as of 06 Apr 2024 05:25  Patient On (Oxygen Delivery Method): room air                 Input and Output:  I&O's Detail    05 Apr 2024 07:01  -  06 Apr 2024 05:34  --------------------------------------------------------  IN:  Total IN: 0 mL    OUT:    Voided (mL): 1000 mL  Total OUT: 1000 mL    Total NET: -1000 mL          Lab Data                        10.5   14.16 )-----------( 218      ( 05 Apr 2024 02:35 )             30.0     04-05    130<L>  |  91<L>  |  26<H>  ----------------------------<  123<H>  4.0   |  27  |  1.10    Ca    9.3      05 Apr 2024 02:35    TPro  7.3  /  Alb  3.4  /  TBili  1.1  /  DBili  x   /  AST  33  /  ALT  24  /  AlkPhos  151<H>  04-05      CARDIAC MARKERS ( 05 Apr 2024 08:30 )  x     / x     / 170 U/L / x     / x            Review of Systems	      Objective     Physical Examination    heart s1s2  lung dc BS  head nc      Pertinent Lab findings & Imaging      Thien:  NO   Adequate UO     I&O's Detail    05 Apr 2024 07:01  -  06 Apr 2024 05:34  --------------------------------------------------------  IN:  Total IN: 0 mL    OUT:    Voided (mL): 1000 mL  Total OUT: 1000 mL    Total NET: -1000 mL               Discussed with:     Cultures:	        Radiology

## 2024-04-06 NOTE — PROGRESS NOTE ADULT - SUBJECTIVE AND OBJECTIVE BOX
Patient is a 89y old  Male who presents with a chief complaint of Mechanical fall (06 Apr 2024 15:30)    INTERVAL HPI/OVERNIGHT EVENTS: Patient was seen and examined. Patient seen sitting in chair. Denies any fever/chills.    I&O's Summary    05 Apr 2024 07:01  -  06 Apr 2024 07:00  --------------------------------------------------------  IN: 0 mL / OUT: 1000 mL / NET: -1000 mL    06 Apr 2024 07:01  -  06 Apr 2024 15:52  --------------------------------------------------------  IN: 0 mL / OUT: 1100 mL / NET: -1100 mL        LABS:                        9.1    8.16  )-----------( 175      ( 06 Apr 2024 06:40 )             26.1     04-06    130<L>  |  94<L>  |  18  ----------------------------<  121<H>  3.7   |  27  |  0.99    Ca    7.7<L>      06 Apr 2024 13:50    TPro  5.6<L>  /  Alb  2.5<L>  /  TBili  0.6  /  DBili  x   /  AST  20  /  ALT  17  /  AlkPhos  110  04-06    PT/INR - ( 05 Apr 2024 02:35 )   PT: 14.6 sec;   INR: 1.26 ratio         PTT - ( 05 Apr 2024 02:35 )  PTT:33.6 sec  Urinalysis Basic - ( 06 Apr 2024 13:50 )    Color: x / Appearance: x / SG: x / pH: x  Gluc: 121 mg/dL / Ketone: x  / Bili: x / Urobili: x   Blood: x / Protein: x / Nitrite: x   Leuk Esterase: x / RBC: x / WBC x   Sq Epi: x / Non Sq Epi: x / Bacteria: x      CAPILLARY BLOOD GLUCOSE    Urinalysis Basic - ( 06 Apr 2024 13:50 )    Color: x / Appearance: x / SG: x / pH: x  Gluc: 121 mg/dL / Ketone: x  / Bili: x / Urobili: x   Blood: x / Protein: x / Nitrite: x   Leuk Esterase: x / RBC: x / WBC x   Sq Epi: x / Non Sq Epi: x / Bacteria: x        MEDICATIONS  (STANDING):  albuterol    0.083% 2.5 milliGRAM(s) Nebulizer every 12 hours  allopurinol 300 milliGRAM(s) Oral daily  amLODIPine   Tablet 2.5 milliGRAM(s) Oral <User Schedule>  aspirin enteric coated 81 milliGRAM(s) Oral daily  atenolol  Tablet 25 milliGRAM(s) Oral daily  enoxaparin Injectable 40 milliGRAM(s) SubCutaneous every 24 hours  finasteride 5 milliGRAM(s) Oral daily  furosemide    Tablet 40 milliGRAM(s) Oral daily  levothyroxine 112 MICROGram(s) Oral daily  lidocaine   4% Patch 1 Patch Transdermal daily  mirtazapine 15 milliGRAM(s) Oral at bedtime  multivitamin 1 Tablet(s) Oral daily  pantoprazole    Tablet 40 milliGRAM(s) Oral before breakfast  pentoxifylline 400 milliGRAM(s) Oral three times a day  simvastatin 5 milliGRAM(s) Oral at bedtime    MEDICATIONS  (PRN):  acetaminophen     Tablet .. 650 milliGRAM(s) Oral every 6 hours PRN Mild Pain (1 - 3)  bisacodyl 5 milliGRAM(s) Oral every 12 hours PRN Constipation      REVIEW OF SYSTEMS:  CONSTITUTIONAL: No fever or chills  HEENT:  No headache, no sore throat  RESPIRATORY: No cough, wheezing, or shortness of breath  CARDIOVASCULAR: No chest pain, palpitations  GASTROINTESTINAL: No abdominal pain, nausea, vomiting, or diarrhea  GENITOURINARY: No dysuria, frequency, or hematuria  NEUROLOGICAL: no focal weakness or dizziness  MUSCULOSKELETAL: no myalgias  PSYCH: no recent changes in mood    RADIOLOGY & ADDITIONAL TESTS:    Imaging Personally Reviewed:  [x] YES  [ ] NO    Consultant(s) Notes Reviewed:  [x] YES  [ ] NO    PHYSICAL EXAM:  T(C): 36.5 (04-06-24 @ 12:07), Max: 36.8 (04-05-24 @ 21:00)  HR: 64 (04-06-24 @ 12:07) (64 - 86)  BP: 135/61 (04-06-24 @ 12:07) (131/68 - 160/69)  RR: 18 (04-06-24 @ 12:07) (18 - 18)  SpO2: 96% (04-06-24 @ 12:07) (95% - 98%)    GENERAL: NAD, well-developed, well-groomed  HEENT:  anicteric, moist mucous membranes  CHEST/LUNG:  CTA b/l, no rales, wheezes, or rhonchi  HEART:  RRR, S1, S2  ABDOMEN:  BS+, soft, nontender, nondistended, + stewart   EXTREMITIES: no edema  NERVOUS SYSTEM: answers questions and follows commands appropriately  PSYCH: normal affect    Care Discussed with Consultants/Other Providers [x] YES  [ ] NO

## 2024-04-06 NOTE — PATIENT PROFILE ADULT - FALL HARM RISK - HARM RISK INTERVENTIONS

## 2024-04-06 NOTE — PROGRESS NOTE ADULT - SUBJECTIVE AND OBJECTIVE BOX
[facility], Division of Infectious Diseases  ANH Mckinney Y. Patel, S. Shah, G. Ashish  [phone]  after hours and weekends [phone]    Name: JOIE DIAZ  Age: 89y  Gender: Male  MRN: [id number]    Interval History--  Notes reviewed  pt up in chair   would like to stand  son at bedside       Allergies    Dilantin (Unknown)  Augmentin (Unknown)  phenobarbital (Unknown)  Nembutal (Unknown)    Intolerances        Medications--  Antibiotics:    Immunologic:    Other:  acetaminophen     Tablet .. PRN  albuterol    0.083%  allopurinol  amLODIPine   Tablet  aspirin enteric coated  atenolol  Tablet  bisacodyl PRN  enoxaparin Injectable  finasteride  furosemide    Tablet  levothyroxine  lidocaine   4% Patch  mirtazapine  multivitamin  pantoprazole    Tablet  pentoxifylline  simvastatin      Review of Systems--  A 10-point review of systems was obtained.     Pertinent positives and negatives--  Constitutional: No fevers. No Chills. No Rigors.   Cardiovascular: No chest pain. No palpitations.  Respiratory: No shortness of breath. No cough.  Gastrointestinal: No nausea or vomiting. No diarrhea or constipation.   Psychiatric: Pleasant. Appropriate affect.    Review of systems otherwise negative except as previously noted.    Physical Examination--  Vital Signs: T(F): 97.7 (04-06-24 @ 12:07), Max: 98.2 (04-05-24 @ 21:00)  HR: 64 (04-06-24 @ 12:07)  BP: 135/61 (04-06-24 @ 12:07)  RR: 18 (04-06-24 @ 12:07)  SpO2: 96% (04-06-24 @ 12:07)  Wt(kg): --  General: Nontoxic-appearing Male in no acute distress.  HEENT: AT/NC  Neck: Not rigid. No sense of mass.  Nodes: None palpable.  Lungs: Clear bilaterally without rales, wheezing or rhonchi  Heart: Regular rate and rhythm.  Abdomen: Bowel sounds present and normoactive. Soft. Nondistended. Nontender.  Back: No spinal tenderness. No costovertebral angle tenderness.   Extremities: No cyanosis or clubbing. + edema.   Skin: Warm. Dry. Good turgor. No rash. No vasculitic stigmata.  Psychiatric: Appropriate affect and mood for situation.   rylie stewart        Laboratory Studies--  CBC                        9.1    8.16  )-----------( 175      ( 06 Apr 2024 06:40 )             26.1       Chemistries  04-06    130<L>  |  94<L>  |  18  ----------------------------<  121<H>  3.7   |  27  |  0.99    Ca    7.7<L>      06 Apr 2024 13:50    TPro  5.6<L>  /  Alb  2.5<L>  /  TBili  0.6  /  DBili  x   /  AST  20  /  ALT  17  /  AlkPhos  110  04-06      Culture Data    Culture - Blood (collected 05 Apr 2024 08:35)  Source: .Blood Blood-Peripheral  Preliminary Report (06 Apr 2024 12:01):    No growth at 24 hours    Culture - Blood (collected 05 Apr 2024 08:30)  Source: .Blood Blood-Peripheral  Preliminary Report (06 Apr 2024 12:01):    No growth at 24 hours    Culture - Urine (collected 05 Apr 2024 05:24)  Source: Catheterized Catheterized  Preliminary Report (06 Apr 2024 14:10):    >100,000 CFU/ml Gram Negative Rods    <10,000 CFU/ml Normal Urogenital sona present

## 2024-04-06 NOTE — PROGRESS NOTE ADULT - ASSESSMENT
89-year-old male history of hypertension hypothyroid with chronic indwelling Stewart catheter brought in by EMS status post multiple fall     Frequent fall/gait abnormality     - cont fall precautions    - cont PT. plan for [facility] for discharge. awaiting placement     rib fractures 11/12  bronchiolitis  atelectasis     - pulmonary following.     - on albuterol BID     - incentive spirometer     - seen by thoracic surgery, no acute intervention.     - lidoderm patch prn pain     leukocytosis/SIRS  chronic stewart     - wbc improved    - ID following. suspect urine is more colonization. UCx prelim GNR    - BC NG x 24 h    hypokalemia     - potassium chloride x 2 doses    BPH     - cont finasteride     hypothyroidism     - cont synthroid     DVT proph: lovenox 40mg daily   updated son Jose Martin over phone.

## 2024-04-07 LAB
ALBUMIN SERPL ELPH-MCNC: 2.4 G/DL — LOW (ref 3.3–5)
ALP SERPL-CCNC: 110 U/L — SIGNIFICANT CHANGE UP (ref 40–120)
ALT FLD-CCNC: 16 U/L — SIGNIFICANT CHANGE UP (ref 12–78)
ANION GAP SERPL CALC-SCNC: 9 MMOL/L — SIGNIFICANT CHANGE UP (ref 5–17)
AST SERPL-CCNC: 17 U/L — SIGNIFICANT CHANGE UP (ref 15–37)
BILIRUB SERPL-MCNC: 0.5 MG/DL — SIGNIFICANT CHANGE UP (ref 0.2–1.2)
BUN SERPL-MCNC: 18 MG/DL — SIGNIFICANT CHANGE UP (ref 7–23)
CALCIUM SERPL-MCNC: 8.3 MG/DL — LOW (ref 8.5–10.1)
CHLORIDE SERPL-SCNC: 98 MMOL/L — SIGNIFICANT CHANGE UP (ref 96–108)
CO2 SERPL-SCNC: 27 MMOL/L — SIGNIFICANT CHANGE UP (ref 22–31)
CREAT SERPL-MCNC: 0.96 MG/DL — SIGNIFICANT CHANGE UP (ref 0.5–1.3)
EGFR: 72 ML/MIN/1.73M2 — SIGNIFICANT CHANGE UP
GLUCOSE SERPL-MCNC: 109 MG/DL — HIGH (ref 70–99)
HCT VFR BLD CALC: 26.5 % — LOW (ref 39–50)
HGB BLD-MCNC: 9.3 G/DL — LOW (ref 13–17)
MAGNESIUM SERPL-MCNC: 1.5 MG/DL — LOW (ref 1.6–2.6)
MCHC RBC-ENTMCNC: 30.8 PG — SIGNIFICANT CHANGE UP (ref 27–34)
MCHC RBC-ENTMCNC: 35.1 GM/DL — SIGNIFICANT CHANGE UP (ref 32–36)
MCV RBC AUTO: 87.7 FL — SIGNIFICANT CHANGE UP (ref 80–100)
NRBC # BLD: 0 /100 WBCS — SIGNIFICANT CHANGE UP (ref 0–0)
PLATELET # BLD AUTO: 169 K/UL — SIGNIFICANT CHANGE UP (ref 150–400)
POTASSIUM SERPL-MCNC: 3.9 MMOL/L — SIGNIFICANT CHANGE UP (ref 3.5–5.3)
POTASSIUM SERPL-SCNC: 3.9 MMOL/L — SIGNIFICANT CHANGE UP (ref 3.5–5.3)
PROT SERPL-MCNC: 5.8 G/DL — LOW (ref 6–8.3)
RBC # BLD: 3.02 M/UL — LOW (ref 4.2–5.8)
RBC # FLD: 16.2 % — HIGH (ref 10.3–14.5)
SODIUM SERPL-SCNC: 134 MMOL/L — LOW (ref 135–145)
WBC # BLD: 8.02 K/UL — SIGNIFICANT CHANGE UP (ref 3.8–10.5)
WBC # FLD AUTO: 8.02 K/UL — SIGNIFICANT CHANGE UP (ref 3.8–10.5)

## 2024-04-07 PROCEDURE — 99233 SBSQ HOSP IP/OBS HIGH 50: CPT

## 2024-04-07 RX ORDER — ASCORBIC ACID 60 MG
500 TABLET,CHEWABLE ORAL
Refills: 0 | Status: DISCONTINUED | OUTPATIENT
Start: 2024-04-07 | End: 2024-04-10

## 2024-04-07 RX ORDER — MAGNESIUM SULFATE 500 MG/ML
1 VIAL (ML) INJECTION ONCE
Refills: 0 | Status: COMPLETED | OUTPATIENT
Start: 2024-04-07 | End: 2024-04-07

## 2024-04-07 RX ORDER — ALPRAZOLAM 0.25 MG
0.25 TABLET ORAL ONCE
Refills: 0 | Status: DISCONTINUED | OUTPATIENT
Start: 2024-04-07 | End: 2024-04-07

## 2024-04-07 RX ORDER — SENNA PLUS 8.6 MG/1
1 TABLET ORAL AT BEDTIME
Refills: 0 | Status: DISCONTINUED | OUTPATIENT
Start: 2024-04-07 | End: 2024-04-10

## 2024-04-07 RX ORDER — MULTIVIT-MIN/FERROUS GLUCONATE 9 MG/15 ML
1 LIQUID (ML) ORAL DAILY
Refills: 0 | Status: DISCONTINUED | OUTPATIENT
Start: 2024-04-07 | End: 2024-04-10

## 2024-04-07 RX ADMIN — Medication 112 MICROGRAM(S): at 06:35

## 2024-04-07 RX ADMIN — Medication 81 MILLIGRAM(S): at 12:20

## 2024-04-07 RX ADMIN — Medication 300 MILLIGRAM(S): at 12:19

## 2024-04-07 RX ADMIN — FINASTERIDE 5 MILLIGRAM(S): 5 TABLET, FILM COATED ORAL at 12:19

## 2024-04-07 RX ADMIN — Medication 500 MILLIGRAM(S): at 19:20

## 2024-04-07 RX ADMIN — Medication 100 GRAM(S): at 10:02

## 2024-04-07 RX ADMIN — Medication 0.25 MILLIGRAM(S): at 22:18

## 2024-04-07 RX ADMIN — Medication 400 MILLIGRAM(S): at 22:18

## 2024-04-07 RX ADMIN — MIRTAZAPINE 15 MILLIGRAM(S): 45 TABLET, ORALLY DISINTEGRATING ORAL at 22:18

## 2024-04-07 RX ADMIN — SENNA PLUS 1 TABLET(S): 8.6 TABLET ORAL at 22:19

## 2024-04-07 RX ADMIN — ATENOLOL 25 MILLIGRAM(S): 25 TABLET ORAL at 06:35

## 2024-04-07 RX ADMIN — Medication 40 MILLIGRAM(S): at 06:35

## 2024-04-07 RX ADMIN — Medication 400 MILLIGRAM(S): at 06:35

## 2024-04-07 RX ADMIN — AMLODIPINE BESYLATE 2.5 MILLIGRAM(S): 2.5 TABLET ORAL at 06:35

## 2024-04-07 RX ADMIN — PANTOPRAZOLE SODIUM 40 MILLIGRAM(S): 20 TABLET, DELAYED RELEASE ORAL at 06:35

## 2024-04-07 RX ADMIN — ALBUTEROL 2.5 MILLIGRAM(S): 90 AEROSOL, METERED ORAL at 19:58

## 2024-04-07 RX ADMIN — ENOXAPARIN SODIUM 40 MILLIGRAM(S): 100 INJECTION SUBCUTANEOUS at 12:19

## 2024-04-07 RX ADMIN — Medication 400 MILLIGRAM(S): at 12:57

## 2024-04-07 RX ADMIN — SIMVASTATIN 5 MILLIGRAM(S): 20 TABLET, FILM COATED ORAL at 22:18

## 2024-04-07 RX ADMIN — Medication 5 MILLIGRAM(S): at 12:19

## 2024-04-07 RX ADMIN — Medication 1 TABLET(S): at 12:20

## 2024-04-07 NOTE — PROGRESS NOTE ADULT - SUBJECTIVE AND OBJECTIVE BOX
Patient is a 89y old  Male who presents with a chief complaint of Mechanical fall (07 Apr 2024 08:20)    INTERVAL HPI/OVERNIGHT EVENTS: Patient was seen and examined. No acute events occurred overnight. Reports constipation for one week.     I&O's Summary    06 Apr 2024 07:01  -  07 Apr 2024 07:00  --------------------------------------------------------  IN: 0 mL / OUT: 1100 mL / NET: -1100 mL    07 Apr 2024 07:01  -  07 Apr 2024 13:49  --------------------------------------------------------  IN: 0 mL / OUT: 950 mL / NET: -950 mL        LABS:                        9.3    8.02  )-----------( 169      ( 07 Apr 2024 07:45 )             26.5     04-07    134<L>  |  98  |  18  ----------------------------<  109<H>  3.9   |  27  |  0.96    Ca    8.3<L>      07 Apr 2024 07:45  Mg     1.5     04-07    TPro  5.8<L>  /  Alb  2.4<L>  /  TBili  0.5  /  DBili  x   /  AST  17  /  ALT  16  /  AlkPhos  110  04-07      Urinalysis Basic - ( 07 Apr 2024 07:45 )    Color: x / Appearance: x / SG: x / pH: x  Gluc: 109 mg/dL / Ketone: x  / Bili: x / Urobili: x   Blood: x / Protein: x / Nitrite: x   Leuk Esterase: x / RBC: x / WBC x   Sq Epi: x / Non Sq Epi: x / Bacteria: x      CAPILLARY BLOOD GLUCOSE            Urinalysis Basic - ( 07 Apr 2024 07:45 )    Color: x / Appearance: x / SG: x / pH: x  Gluc: 109 mg/dL / Ketone: x  / Bili: x / Urobili: x   Blood: x / Protein: x / Nitrite: x   Leuk Esterase: x / RBC: x / WBC x   Sq Epi: x / Non Sq Epi: x / Bacteria: x        MEDICATIONS  (STANDING):  albuterol    0.083% 2.5 milliGRAM(s) Nebulizer every 12 hours  allopurinol 300 milliGRAM(s) Oral daily  amLODIPine   Tablet 2.5 milliGRAM(s) Oral <User Schedule>  aspirin enteric coated 81 milliGRAM(s) Oral daily  atenolol  Tablet 25 milliGRAM(s) Oral daily  enoxaparin Injectable 40 milliGRAM(s) SubCutaneous every 24 hours  finasteride 5 milliGRAM(s) Oral daily  furosemide    Tablet 40 milliGRAM(s) Oral daily  levothyroxine 112 MICROGram(s) Oral daily  lidocaine   4% Patch 1 Patch Transdermal daily  mirtazapine 15 milliGRAM(s) Oral at bedtime  multivitamin 1 Tablet(s) Oral daily  pantoprazole    Tablet 40 milliGRAM(s) Oral before breakfast  pentoxifylline 400 milliGRAM(s) Oral three times a day  simvastatin 5 milliGRAM(s) Oral at bedtime    MEDICATIONS  (PRN):  acetaminophen     Tablet .. 650 milliGRAM(s) Oral every 6 hours PRN Mild Pain (1 - 3)  bisacodyl 5 milliGRAM(s) Oral every 12 hours PRN Constipation      REVIEW OF SYSTEMS:  CONSTITUTIONAL: No fever or chills  HEENT:  No headache, no sore throat  RESPIRATORY: No cough, wheezing, or shortness of breath  CARDIOVASCULAR: No chest pain, palpitations  GASTROINTESTINAL: No abdominal pain, nausea, vomiting, or diarrhea  GENITOURINARY: No dysuria, frequency, or hematuria  NEUROLOGICAL: no focal weakness or dizziness  MUSCULOSKELETAL: no myalgias  PSYCH: no recent changes in mood    RADIOLOGY & ADDITIONAL TESTS:    Imaging Personally Reviewed:  [x] YES  [ ] NO    Consultant(s) Notes Reviewed:  [x] YES  [ ] NO    PHYSICAL EXAM:  T(C): 36.5 (04-07-24 @ 12:04), Max: 36.8 (04-06-24 @ 19:30)  HR: 65 (04-07-24 @ 12:04) (65 - 117)  BP: 102/64 (04-07-24 @ 12:04) (102/64 - 133/70)  RR: 18 (04-07-24 @ 12:04) (17 - 18)  SpO2: 97% (04-07-24 @ 12:04) (95% - 97%)    GENERAL: NAD, well-developed, well-groomed  HEENT:  anicteric, moist mucous membranes  CHEST/LUNG:  CTA b/l, no rales, wheezes, or rhonchi  HEART:  RRR, S1, S2  ABDOMEN:  BS+, soft, nontender, nondistended  EXTREMITIES: +1 edema  NERVOUS SYSTEM: answers questions and follows commands appropriately  PSYCH: normal affect    Care Discussed with Consultants/Other Providers [x] YES  [ ] NO

## 2024-04-07 NOTE — DIETITIAN INITIAL EVALUATION ADULT - PERTINENT MEDS FT
MEDICATIONS  (STANDING):  albuterol    0.083% 2.5 milliGRAM(s) Nebulizer every 12 hours  allopurinol 300 milliGRAM(s) Oral daily  amLODIPine   Tablet 2.5 milliGRAM(s) Oral <User Schedule>  aspirin enteric coated 81 milliGRAM(s) Oral daily  atenolol  Tablet 25 milliGRAM(s) Oral daily  bisacodyl Suppository 10 milliGRAM(s) Rectal once  enoxaparin Injectable 40 milliGRAM(s) SubCutaneous every 24 hours  finasteride 5 milliGRAM(s) Oral daily  furosemide    Tablet 40 milliGRAM(s) Oral daily  levothyroxine 112 MICROGram(s) Oral daily  lidocaine   4% Patch 1 Patch Transdermal daily  mirtazapine 15 milliGRAM(s) Oral at bedtime  multivitamin 1 Tablet(s) Oral daily  pantoprazole    Tablet 40 milliGRAM(s) Oral before breakfast  pentoxifylline 400 milliGRAM(s) Oral three times a day  senna 1 Tablet(s) Oral at bedtime  simvastatin 5 milliGRAM(s) Oral at bedtime    MEDICATIONS  (PRN):  acetaminophen     Tablet .. 650 milliGRAM(s) Oral every 6 hours PRN Mild Pain (1 - 3)  bisacodyl 5 milliGRAM(s) Oral every 12 hours PRN Constipation

## 2024-04-07 NOTE — DIETITIAN INITIAL EVALUATION ADULT - ENTER TO (ML/KG)
30 Colchicine Counseling:  Patient counseled regarding adverse effects including but not limited to stomach upset (nausea, vomiting, stomach pain, or diarrhea).  Patient instructed to limit alcohol consumption while taking this medication.  Colchicine may reduce blood counts especially with prolonged use.  The patient understands that monitoring of kidney function and blood counts may be required, especially at baseline. The patient verbalized understanding of the proper use and possible adverse effects of colchicine.  All of the patient's questions and concerns were addressed.

## 2024-04-07 NOTE — DIETITIAN INITIAL EVALUATION ADULT - OTHER INFO
Pt is a "89-year-old male history of hypertension hypothyroid with chronic indwelling Neumann catheter brought in by EMS status post multiple fall."    Visited pt at this afternoon. Pt sitting in chair during visit. Reports fair appetite/intake. Consumed >50% of breakfast and lunch meal today. Tolerating diet well. Denies N/V/D/C. No BMs thus far; bowel regimen rx. CBW on admission 140#. Pt reports stable weight over the past several months. Reports previous wt of ~160# few years ago. No edema noted. Skin: stage II pressure ulcer to L lateral malleolus. Pt currently on DASH/TLC diet. Food preferences obtained to optimize po intake/tolerance. Recommend assistance/encouragement at meal times. Pt declines oral nutritional supplements. RD remains available and will continue to follow-up.

## 2024-04-07 NOTE — DIETITIAN INITIAL EVALUATION ADULT - PROBLEM SELECTOR PLAN 3
- Mechanical fall, recently admitted 2/2024 for fall/weakness  - CT Head:  No CT evidence of acute intracranial pathology.  - CT AP: Acute displaced left posterior 11th and 12th rib fractures. No pneumothorax  - stat CK  - lidocaine patch for pain  - Fall precautions  - PT/OT consult  - SW consult  - Palliative consult  - Pulm consulted

## 2024-04-07 NOTE — DIETITIAN INITIAL EVALUATION ADULT - ADD RECOMMEND
1) Continue diet as tolerated; will honor pt's food preferences as able to optimize po intake/tolerance  2) Continue MVI daily, recommend Vit C 500mg BID  3) Monitor po intake, diet tolerance, weight trends, labs, GI function, skin integrity

## 2024-04-07 NOTE — PROGRESS NOTE ADULT - ASSESSMENT
89-year-old male history of hypertension hypothyroid with chronic indwelling Stewart catheter brought in by EMS status post multiple fall     Frequent fall/gait abnormality     - cont fall precautions    - cont PT. plan for [facility] for discharge. awaiting placement     rib fractures 11/12  bronchiolitis  atelectasis     - pulmonary following.     - on albuterol BID     - incentive spirometer     - seen by thoracic surgery, no acute intervention.     - lidoderm patch prn pain     leukocytosis/SIRS  chronic stewart     - wbc improved    - ID following. suspect urine is more colonization.     - BC NG x 48 h    BPH     - cont finasteride     hypothyroidism     - cont synthroid     DVT proph: lovenox 40mg daily   add dulcolax suppository and senna at night for constipation.

## 2024-04-07 NOTE — DIETITIAN INITIAL EVALUATION ADULT - ORAL INTAKE PTA/DIET HISTORY
Pt lives at home alone. Prepares own meals. Pt's son brings over dinner 1-2 nights per week. Has aide that take him grocery shopping weekly. Pt reports good appetite/intake PTA. Typically consumes 3 meals/day. Consumes eggs, sandwiches, hamburgers, frozen vegetables, mash potato, sweet potato fries. Does not consume oral nutritional supplements.

## 2024-04-07 NOTE — PATIENT CHOICE NOTE. - NSPTCHOICESTATE_GEN_ALL_CORE

## 2024-04-07 NOTE — DIETITIAN INITIAL EVALUATION ADULT - PHYSCIAL ASSESSMENT
BMI 22  Muscle depletion observed; suspect age-related losses  Pt eating well, stable weight other (specify)

## 2024-04-07 NOTE — PROGRESS NOTE ADULT - SUBJECTIVE AND OBJECTIVE BOX
Date/Time Patient Seen:  		  Referring MD:   Data Reviewed	       Patient is a 89y old  Male who presents with a chief complaint of Mechanical fall (06 Apr 2024 15:51)      Subjective/HPI     PAST MEDICAL & SURGICAL HISTORY:  HTN (hypertension)    High cholesterol    Spinal stenosis    Hypothyroidism    Insomnia    No significant past surgical history    S/P cholecystectomy    H/O inguinal hernia repair    S/P cardiac cath          Medication list         MEDICATIONS  (STANDING):  albuterol    0.083% 2.5 milliGRAM(s) Nebulizer every 12 hours  allopurinol 300 milliGRAM(s) Oral daily  amLODIPine   Tablet 2.5 milliGRAM(s) Oral <User Schedule>  aspirin enteric coated 81 milliGRAM(s) Oral daily  atenolol  Tablet 25 milliGRAM(s) Oral daily  enoxaparin Injectable 40 milliGRAM(s) SubCutaneous every 24 hours  finasteride 5 milliGRAM(s) Oral daily  furosemide    Tablet 40 milliGRAM(s) Oral daily  levothyroxine 112 MICROGram(s) Oral daily  lidocaine   4% Patch 1 Patch Transdermal daily  mirtazapine 15 milliGRAM(s) Oral at bedtime  multivitamin 1 Tablet(s) Oral daily  pantoprazole    Tablet 40 milliGRAM(s) Oral before breakfast  pentoxifylline 400 milliGRAM(s) Oral three times a day  simvastatin 5 milliGRAM(s) Oral at bedtime    MEDICATIONS  (PRN):  acetaminophen     Tablet .. 650 milliGRAM(s) Oral every 6 hours PRN Mild Pain (1 - 3)  bisacodyl 5 milliGRAM(s) Oral every 12 hours PRN Constipation         Vitals log        ICU Vital Signs Last 24 Hrs  T(C): 36.4 (07 Apr 2024 05:15), Max: 36.8 (06 Apr 2024 19:30)  T(F): 97.6 (07 Apr 2024 05:15), Max: 98.2 (06 Apr 2024 19:30)  HR: 82 (07 Apr 2024 05:15) (64 - 117)  BP: 133/70 (07 Apr 2024 05:15) (108/62 - 135/61)  BP(mean): --  ABP: --  ABP(mean): --  RR: 17 (07 Apr 2024 05:15) (17 - 18)  SpO2: 95% (07 Apr 2024 05:15) (95% - 97%)    O2 Parameters below as of 07 Apr 2024 05:15  Patient On (Oxygen Delivery Method): room air                 Input and Output:  I&O's Detail    06 Apr 2024 07:01  -  07 Apr 2024 07:00  --------------------------------------------------------  IN:  Total IN: 0 mL    OUT:    Indwelling Catheter - Urethral (mL): 1100 mL  Total OUT: 1100 mL    Total NET: -1100 mL      07 Apr 2024 07:01  -  07 Apr 2024 08:20  --------------------------------------------------------  IN:  Total IN: 0 mL    OUT:    Indwelling Catheter - Urethral (mL): 950 mL  Total OUT: 950 mL    Total NET: -950 mL          Lab Data                        9.1    8.16  )-----------( 175      ( 06 Apr 2024 06:40 )             26.1     04-06    130<L>  |  94<L>  |  18  ----------------------------<  121<H>  3.7   |  27  |  0.99    Ca    7.7<L>      06 Apr 2024 13:50    TPro  5.6<L>  /  Alb  2.5<L>  /  TBili  0.6  /  DBili  x   /  AST  20  /  ALT  17  /  AlkPhos  110  04-06      CARDIAC MARKERS ( 05 Apr 2024 08:30 )  x     / x     / 170 U/L / x     / x            Review of Systems	      Objective     Physical Examination    heart s1s2  lung dc bS  head nc      Pertinent Lab findings & Imaging      Thien:  NO   Adequate UO     I&O's Detail    06 Apr 2024 07:01  -  07 Apr 2024 07:00  --------------------------------------------------------  IN:  Total IN: 0 mL    OUT:    Indwelling Catheter - Urethral (mL): 1100 mL  Total OUT: 1100 mL    Total NET: -1100 mL      07 Apr 2024 07:01  -  07 Apr 2024 08:20  --------------------------------------------------------  IN:  Total IN: 0 mL    OUT:    Indwelling Catheter - Urethral (mL): 950 mL  Total OUT: 950 mL    Total NET: -950 mL               Discussed with:     Cultures:	        Radiology

## 2024-04-07 NOTE — DIETITIAN INITIAL EVALUATION ADULT - PERTINENT LABORATORY DATA
04-07    134<L>  |  98  |  18  ----------------------------<  109<H>  3.9   |  27  |  0.96    Ca    8.3<L>      07 Apr 2024 07:45  Mg     1.5     04-07    TPro  5.8<L>  /  Alb  2.4<L>  /  TBili  0.5  /  DBili  x   /  AST  17  /  ALT  16  /  AlkPhos  110  04-07

## 2024-04-07 NOTE — DIETITIAN INITIAL EVALUATION ADULT - PROBLEM SELECTOR PLAN 2
- WBC 14.16, UA moderate leuk esterase, positive nitrite  - hx/o recurrent UTIs: 2/10/2024 >100,000 CFU/ml Pseudomonas aeruginosa, 2/1/2024 >100,000 CFU/ml Proteus mirabilis -- both susceptible to cefepime  - f/u UCx  - ID consulted

## 2024-04-07 NOTE — SOCIAL WORK PROGRESS NOTE - NSSWPROGRESSNOTE_GEN_ALL_CORE
sent sub-acute rehab referral to patient's son's preferred facility choice, the [facility], and then spoke w/ Aliyah in admissions who confirmed that patient has been accepted pending receipt of ELIAS & insurance authorization.  to forward ELIAS upon completion. Additionally,  spoke w/ Max of [employer] @ p [phone] who authorized patient for 13 days of inpatient skilled nursing from 04/08 to 04/20/2024 under authorization #[id number]; of note, this authorization was issued over the phone prior to  faxing clinical docs, therefore clinical docs were faxed to f [phone] thereafter. Notified attending MD and receiving facility's admissions dept of the above authorization info & will continue to follow.

## 2024-04-07 NOTE — DIETITIAN INITIAL EVALUATION ADULT - PROBLEM SELECTOR PLAN 1
Patient meets sepsis criteria on admission: WBC 14.16,  likely 2/2 UTI   - UA moderate leuk esterase, positive nitrite  - CT chest: Scattered tree-in bud opacities at the lung bases in the lower lobes, lingula, and right middle lobe likely reflective of a bronchiolitis. Mild smooth interlobular septal thickening suggestive of mild interstitial edema. Small bilateral pleural effusions.   - CT a/p: No acute intra-abdominal or pelvic findings on unenhanced CT of the abdomen and pelvis. Lack of intravenous contrast limits evaluation of the solid abdominal organs and vasculature.  - S/P 500cc NS bolus  - stat lactate, f/u repeat  - Trend WBC and monitor for fever  - F/u UCx, BCx x2  -start Cefepime and f/u ID rec's Dr Shahida Guzman  - ID consulted, f/u recs

## 2024-04-08 LAB
-  AMOXICILLIN/CLAVULANIC ACID: SIGNIFICANT CHANGE UP
-  AMPICILLIN/SULBACTAM: SIGNIFICANT CHANGE UP
-  AMPICILLIN: SIGNIFICANT CHANGE UP
-  AZTREONAM: SIGNIFICANT CHANGE UP
-  CEFAZOLIN: SIGNIFICANT CHANGE UP
-  CEFEPIME: SIGNIFICANT CHANGE UP
-  CEFOXITIN: SIGNIFICANT CHANGE UP
-  CEFTRIAXONE: SIGNIFICANT CHANGE UP
-  CEFUROXIME: SIGNIFICANT CHANGE UP
-  CIPROFLOXACIN: SIGNIFICANT CHANGE UP
-  ERTAPENEM: SIGNIFICANT CHANGE UP
-  GENTAMICIN: SIGNIFICANT CHANGE UP
-  LEVOFLOXACIN: SIGNIFICANT CHANGE UP
-  MEROPENEM: SIGNIFICANT CHANGE UP
-  NITROFURANTOIN: SIGNIFICANT CHANGE UP
-  PIPERACILLIN/TAZOBACTAM: SIGNIFICANT CHANGE UP
-  TOBRAMYCIN: SIGNIFICANT CHANGE UP
-  TRIMETHOPRIM/SULFAMETHOXAZOLE: SIGNIFICANT CHANGE UP
ALBUMIN SERPL ELPH-MCNC: 2.3 G/DL — LOW (ref 3.3–5)
ALP SERPL-CCNC: 102 U/L — SIGNIFICANT CHANGE UP (ref 40–120)
ALT FLD-CCNC: 19 U/L — SIGNIFICANT CHANGE UP (ref 12–78)
ANION GAP SERPL CALC-SCNC: 8 MMOL/L — SIGNIFICANT CHANGE UP (ref 5–17)
AST SERPL-CCNC: 22 U/L — SIGNIFICANT CHANGE UP (ref 15–37)
BILIRUB SERPL-MCNC: 0.4 MG/DL — SIGNIFICANT CHANGE UP (ref 0.2–1.2)
BUN SERPL-MCNC: 20 MG/DL — SIGNIFICANT CHANGE UP (ref 7–23)
CALCIUM SERPL-MCNC: 8.5 MG/DL — SIGNIFICANT CHANGE UP (ref 8.5–10.1)
CHLORIDE SERPL-SCNC: 97 MMOL/L — SIGNIFICANT CHANGE UP (ref 96–108)
CO2 SERPL-SCNC: 29 MMOL/L — SIGNIFICANT CHANGE UP (ref 22–31)
CREAT SERPL-MCNC: 0.93 MG/DL — SIGNIFICANT CHANGE UP (ref 0.5–1.3)
CULTURE RESULTS: ABNORMAL
EGFR: 75 ML/MIN/1.73M2 — SIGNIFICANT CHANGE UP
FERRITIN SERPL-MCNC: 166 NG/ML — SIGNIFICANT CHANGE UP (ref 30–400)
GLUCOSE SERPL-MCNC: 111 MG/DL — HIGH (ref 70–99)
HCT VFR BLD CALC: 25.7 % — LOW (ref 39–50)
HGB BLD-MCNC: 8.9 G/DL — LOW (ref 13–17)
IRON SATN MFR SERPL: 13 % — LOW (ref 16–55)
IRON SATN MFR SERPL: 30 UG/DL — LOW (ref 45–165)
MAGNESIUM SERPL-MCNC: 1.6 MG/DL — SIGNIFICANT CHANGE UP (ref 1.6–2.6)
MCHC RBC-ENTMCNC: 30.9 PG — SIGNIFICANT CHANGE UP (ref 27–34)
MCHC RBC-ENTMCNC: 34.6 GM/DL — SIGNIFICANT CHANGE UP (ref 32–36)
MCV RBC AUTO: 89.2 FL — SIGNIFICANT CHANGE UP (ref 80–100)
METHOD TYPE: SIGNIFICANT CHANGE UP
NRBC # BLD: 0 /100 WBCS — SIGNIFICANT CHANGE UP (ref 0–0)
ORGANISM # SPEC MICROSCOPIC CNT: ABNORMAL
ORGANISM # SPEC MICROSCOPIC CNT: SIGNIFICANT CHANGE UP
PLATELET # BLD AUTO: 164 K/UL — SIGNIFICANT CHANGE UP (ref 150–400)
POTASSIUM SERPL-MCNC: 3.9 MMOL/L — SIGNIFICANT CHANGE UP (ref 3.5–5.3)
POTASSIUM SERPL-SCNC: 3.9 MMOL/L — SIGNIFICANT CHANGE UP (ref 3.5–5.3)
PROT SERPL-MCNC: 5.6 G/DL — LOW (ref 6–8.3)
RBC # BLD: 2.88 M/UL — LOW (ref 4.2–5.8)
RBC # FLD: 16.2 % — HIGH (ref 10.3–14.5)
SODIUM SERPL-SCNC: 134 MMOL/L — LOW (ref 135–145)
SPECIMEN SOURCE: SIGNIFICANT CHANGE UP
TIBC SERPL-MCNC: 228 UG/DL — SIGNIFICANT CHANGE UP (ref 220–430)
TRANSFERRIN SERPL-MCNC: 189 MG/DL — LOW (ref 200–360)
UIBC SERPL-MCNC: 198 UG/DL — SIGNIFICANT CHANGE UP (ref 110–370)
WBC # BLD: 7.48 K/UL — SIGNIFICANT CHANGE UP (ref 3.8–10.5)
WBC # FLD AUTO: 7.48 K/UL — SIGNIFICANT CHANGE UP (ref 3.8–10.5)

## 2024-04-08 PROCEDURE — 99233 SBSQ HOSP IP/OBS HIGH 50: CPT

## 2024-04-08 RX ORDER — POLYETHYLENE GLYCOL 3350 17 G/17G
17 POWDER, FOR SOLUTION ORAL DAILY
Refills: 0 | Status: DISCONTINUED | OUTPATIENT
Start: 2024-04-08 | End: 2024-04-10

## 2024-04-08 RX ORDER — MAGNESIUM SULFATE 500 MG/ML
1 VIAL (ML) INJECTION ONCE
Refills: 0 | Status: COMPLETED | OUTPATIENT
Start: 2024-04-08 | End: 2024-04-08

## 2024-04-08 RX ORDER — ALPRAZOLAM 0.25 MG
0.25 TABLET ORAL ONCE
Refills: 0 | Status: DISCONTINUED | OUTPATIENT
Start: 2024-04-08 | End: 2024-04-08

## 2024-04-08 RX ORDER — LANOLIN ALCOHOL/MO/W.PET/CERES
3 CREAM (GRAM) TOPICAL AT BEDTIME
Refills: 0 | Status: DISCONTINUED | OUTPATIENT
Start: 2024-04-08 | End: 2024-04-10

## 2024-04-08 RX ADMIN — Medication 400 MILLIGRAM(S): at 21:30

## 2024-04-08 RX ADMIN — POLYETHYLENE GLYCOL 3350 17 GRAM(S): 17 POWDER, FOR SOLUTION ORAL at 17:53

## 2024-04-08 RX ADMIN — Medication 81 MILLIGRAM(S): at 12:52

## 2024-04-08 RX ADMIN — Medication 0.25 MILLIGRAM(S): at 22:47

## 2024-04-08 RX ADMIN — Medication 300 MILLIGRAM(S): at 12:52

## 2024-04-08 RX ADMIN — Medication 500 MILLIGRAM(S): at 06:29

## 2024-04-08 RX ADMIN — FINASTERIDE 5 MILLIGRAM(S): 5 TABLET, FILM COATED ORAL at 12:52

## 2024-04-08 RX ADMIN — AMLODIPINE BESYLATE 2.5 MILLIGRAM(S): 2.5 TABLET ORAL at 06:28

## 2024-04-08 RX ADMIN — SIMVASTATIN 5 MILLIGRAM(S): 20 TABLET, FILM COATED ORAL at 21:30

## 2024-04-08 RX ADMIN — ENOXAPARIN SODIUM 40 MILLIGRAM(S): 100 INJECTION SUBCUTANEOUS at 12:53

## 2024-04-08 RX ADMIN — Medication 1 ENEMA: at 08:56

## 2024-04-08 RX ADMIN — Medication 40 MILLIGRAM(S): at 06:28

## 2024-04-08 RX ADMIN — SENNA PLUS 1 TABLET(S): 8.6 TABLET ORAL at 21:29

## 2024-04-08 RX ADMIN — Medication 400 MILLIGRAM(S): at 06:29

## 2024-04-08 RX ADMIN — MIRTAZAPINE 15 MILLIGRAM(S): 45 TABLET, ORALLY DISINTEGRATING ORAL at 21:30

## 2024-04-08 RX ADMIN — Medication 10 MILLIGRAM(S): at 06:39

## 2024-04-08 RX ADMIN — Medication 400 MILLIGRAM(S): at 13:08

## 2024-04-08 RX ADMIN — ALBUTEROL 2.5 MILLIGRAM(S): 90 AEROSOL, METERED ORAL at 07:29

## 2024-04-08 RX ADMIN — ALBUTEROL 2.5 MILLIGRAM(S): 90 AEROSOL, METERED ORAL at 19:20

## 2024-04-08 RX ADMIN — Medication 100 GRAM(S): at 12:53

## 2024-04-08 RX ADMIN — ATENOLOL 25 MILLIGRAM(S): 25 TABLET ORAL at 06:28

## 2024-04-08 RX ADMIN — LIDOCAINE 1 PATCH: 4 CREAM TOPICAL at 12:53

## 2024-04-08 RX ADMIN — PANTOPRAZOLE SODIUM 40 MILLIGRAM(S): 20 TABLET, DELAYED RELEASE ORAL at 06:28

## 2024-04-08 RX ADMIN — Medication 1 TABLET(S): at 12:52

## 2024-04-08 RX ADMIN — Medication 112 MICROGRAM(S): at 06:28

## 2024-04-08 RX ADMIN — LIDOCAINE 1 PATCH: 4 CREAM TOPICAL at 19:19

## 2024-04-08 NOTE — PROGRESS NOTE ADULT - SUBJECTIVE AND OBJECTIVE BOX
[facility] DIVISION of INFECTIOUS DISEASE  Ton Jackson MD PhD, Amanda Murray MD, Shahida Guzman MD, Ramses Disla MD, Edouard Hinojosa MD  and providing coverage with Theodore Farrar MD  Providing Infectious Disease Consultations at [facility], [facility], [facility]'s    Office# [phone] to schedule follow up appointments  Answering Service for urgent calls or New Consults [phone]  Cell# to text for urgent issues Ton Jackson [phone]     infectious diseases progress note:    JOIE DIAZ is a 89y y. o. Male patient    Overnight and events of the last 24hrs reviewed    Allergies    Dilantin (Unknown)  Augmentin (Unknown)  phenobarbital (Unknown)  Nembutal (Unknown)    Intolerances        ANTIBIOTICS/RELEVANT:  antimicrobials    immunologic:    OTHER:  acetaminophen     Tablet .. 650 milliGRAM(s) Oral every 6 hours PRN  albuterol    0.083% 2.5 milliGRAM(s) Nebulizer every 12 hours  allopurinol 300 milliGRAM(s) Oral daily  amLODIPine   Tablet 2.5 milliGRAM(s) Oral <User Schedule>  ascorbic acid 500 milliGRAM(s) Oral two times a day  aspirin enteric coated 81 milliGRAM(s) Oral daily  atenolol  Tablet 25 milliGRAM(s) Oral daily  bisacodyl 5 milliGRAM(s) Oral every 12 hours PRN  enoxaparin Injectable 40 milliGRAM(s) SubCutaneous every 24 hours  finasteride 5 milliGRAM(s) Oral daily  furosemide    Tablet 40 milliGRAM(s) Oral daily  levothyroxine 112 MICROGram(s) Oral daily  lidocaine   4% Patch 1 Patch Transdermal daily  melatonin 3 milliGRAM(s) Oral at bedtime PRN  mirtazapine 15 milliGRAM(s) Oral at bedtime  multivitamin/minerals 1 Tablet(s) Oral daily  pantoprazole    Tablet 40 milliGRAM(s) Oral before breakfast  pentoxifylline 400 milliGRAM(s) Oral three times a day  senna 1 Tablet(s) Oral at bedtime  simvastatin 5 milliGRAM(s) Oral at bedtime      Objective:  Vital Signs Last 24 Hrs  T(C): 36.9 (08 Apr 2024 12:12), Max: 37.1 (08 Apr 2024 05:01)  T(F): 98.5 (08 Apr 2024 12:12), Max: 98.8 (08 Apr 2024 05:01)  HR: 67 (08 Apr 2024 12:12) (67 - 87)  BP: 106/62 (08 Apr 2024 12:12) (106/62 - 143/63)  BP(mean): 3 (08 Apr 2024 05:01) (3 - 3)  RR: 18 (08 Apr 2024 12:12) (18 - 18)  SpO2: 94% (08 Apr 2024 12:12) (92% - 96%)    Parameters below as of 08 Apr 2024 12:12  Patient On (Oxygen Delivery Method): room air        T(C): 36.9 (04-08-24 @ 12:12), Max: 37.1 (04-08-24 @ 05:01)  T(C): 36.9 (04-08-24 @ 12:12), Max: 37.1 (04-08-24 @ 05:01)  T(C): 36.9 (04-08-24 @ 12:12), Max: 37.1 (04-08-24 @ 05:01)    PHYSICAL EXAM:  HEENT: NC atraumatic  Neck: supple  Respiratory: no accessory muscle use, breathing comfortably  Cardiovascular: distant  Gastrointestinal: normal appearing, nondistended  Extremities: no clubbing, no cyanosis,        LABS:                          8.9    7.48  )-----------( 164      ( 08 Apr 2024 05:30 )             25.7       WBC  7.48 04-08 @ 05:30  8.02 04-07 @ 07:45  8.16 04-06 @ 06:40  14.16 04-05 @ 02:35      04-08    134<L>  |  97  |  20  ----------------------------<  111<H>  3.9   |  29  |  0.93    Ca    8.5      08 Apr 2024 05:30  Mg     1.6     04-08    TPro  5.6<L>  /  Alb  2.3<L>  /  TBili  0.4  /  DBili  x   /  AST  22  /  ALT  19  /  AlkPhos  102  04-08      Creatinine: 0.93 mg/dL (04-08-24 @ 05:30)  Creatinine: 0.96 mg/dL (04-07-24 @ 07:45)  Creatinine: 0.99 mg/dL (04-06-24 @ 13:50)  Creatinine: 0.74 mg/dL (04-06-24 @ 06:40)  Creatinine: 1.10 mg/dL (04-05-24 @ 02:35)        Urinalysis Basic - ( 08 Apr 2024 05:30 )    Color: x / Appearance: x / SG: x / pH: x  Gluc: 111 mg/dL / Ketone: x  / Bili: x / Urobili: x   Blood: x / Protein: x / Nitrite: x   Leuk Esterase: x / RBC: x / WBC x   Sq Epi: x / Non Sq Epi: x / Bacteria: x            INFLAMMATORY MARKERS      MICROBIOLOGY:              RADIOLOGY & ADDITIONAL STUDIES:

## 2024-04-08 NOTE — PROGRESS NOTE ADULT - ASSESSMENT
89yo M PMHx HTN, hypothyroidism, HLD, chronic back pain 2/2 spinal stenosis, chronic indwelling Stewart brought in by EMS for mechanical fall.    tachy leukocytosis - sirs     RECOMMENDATIONS  wbc down  ua bland- no pyuria   urine cx gnr-- colonization not active infeciton  stewart care     trend wbc   monitor off antibx     Thank you for consulting us and involving us in the management of this most interesting and challenging case.  Please call us at [phone] or text me directly on my cell#[phone] with any concerns or further questions.

## 2024-04-08 NOTE — PROGRESS NOTE ADULT - SUBJECTIVE AND OBJECTIVE BOX
Date/Time Patient Seen:  		  Referring MD:   Data Reviewed	       Patient is a 89y old  Male who presents with a chief complaint of Repeated falls     (07 Apr 2024 14:43)      Subjective/HPI     PAST MEDICAL & SURGICAL HISTORY:  HTN (hypertension)    High cholesterol    Spinal stenosis    Hypothyroidism    Insomnia    No significant past surgical history    S/P cholecystectomy    H/O inguinal hernia repair    S/P cardiac cath          Medication list         MEDICATIONS  (STANDING):  albuterol    0.083% 2.5 milliGRAM(s) Nebulizer every 12 hours  allopurinol 300 milliGRAM(s) Oral daily  amLODIPine   Tablet 2.5 milliGRAM(s) Oral <User Schedule>  ascorbic acid 500 milliGRAM(s) Oral two times a day  aspirin enteric coated 81 milliGRAM(s) Oral daily  atenolol  Tablet 25 milliGRAM(s) Oral daily  bisacodyl Suppository 10 milliGRAM(s) Rectal once  enoxaparin Injectable 40 milliGRAM(s) SubCutaneous every 24 hours  finasteride 5 milliGRAM(s) Oral daily  furosemide    Tablet 40 milliGRAM(s) Oral daily  levothyroxine 112 MICROGram(s) Oral daily  lidocaine   4% Patch 1 Patch Transdermal daily  mirtazapine 15 milliGRAM(s) Oral at bedtime  multivitamin/minerals 1 Tablet(s) Oral daily  pantoprazole    Tablet 40 milliGRAM(s) Oral before breakfast  pentoxifylline 400 milliGRAM(s) Oral three times a day  senna 1 Tablet(s) Oral at bedtime  simvastatin 5 milliGRAM(s) Oral at bedtime    MEDICATIONS  (PRN):  acetaminophen     Tablet .. 650 milliGRAM(s) Oral every 6 hours PRN Mild Pain (1 - 3)  bisacodyl 5 milliGRAM(s) Oral every 12 hours PRN Constipation         Vitals log        ICU Vital Signs Last 24 Hrs  T(C): 37.1 (08 Apr 2024 05:01), Max: 37.1 (08 Apr 2024 05:01)  T(F): 98.8 (08 Apr 2024 05:01), Max: 98.8 (08 Apr 2024 05:01)  HR: 83 (08 Apr 2024 05:01) (65 - 87)  BP: 119/65 (08 Apr 2024 05:01) (102/64 - 143/63)  BP(mean): 3 (08 Apr 2024 05:01) (3 - 3)  ABP: --  ABP(mean): --  RR: 18 (08 Apr 2024 05:01) (17 - 18)  SpO2: 92% (08 Apr 2024 05:01) (92% - 97%)    O2 Parameters below as of 08 Apr 2024 05:01  Patient On (Oxygen Delivery Method): room air                 Input and Output:  I&O's Detail    06 Apr 2024 07:01  -  07 Apr 2024 07:00  --------------------------------------------------------  IN:  Total IN: 0 mL    OUT:    Indwelling Catheter - Urethral (mL): 1100 mL  Total OUT: 1100 mL    Total NET: -1100 mL      07 Apr 2024 07:01  -  08 Apr 2024 05:14  --------------------------------------------------------  IN:  Total IN: 0 mL    OUT:    Indwelling Catheter - Urethral (mL): 2050 mL  Total OUT: 2050 mL    Total NET: -2050 mL          Lab Data                        9.3    8.02  )-----------( 169      ( 07 Apr 2024 07:45 )             26.5     04-07    134<L>  |  98  |  18  ----------------------------<  109<H>  3.9   |  27  |  0.96    Ca    8.3<L>      07 Apr 2024 07:45  Mg     1.5     04-07    TPro  5.8<L>  /  Alb  2.4<L>  /  TBili  0.5  /  DBili  x   /  AST  17  /  ALT  16  /  AlkPhos  110  04-07            Review of Systems	      Objective     Physical Examination    heart s1s2  lung dc BS  head nc      Pertinent Lab findings & Imaging      Thien:  NO   Adequate UO     I&O's Detail    06 Apr 2024 07:01  -  07 Apr 2024 07:00  --------------------------------------------------------  IN:  Total IN: 0 mL    OUT:    Indwelling Catheter - Urethral (mL): 1100 mL  Total OUT: 1100 mL    Total NET: -1100 mL      07 Apr 2024 07:01  -  08 Apr 2024 05:14  --------------------------------------------------------  IN:  Total IN: 0 mL    OUT:    Indwelling Catheter - Urethral (mL): 2050 mL  Total OUT: 2050 mL    Total NET: -2050 mL               Discussed with:     Cultures:	        Radiology

## 2024-04-08 NOTE — PROGRESS NOTE ADULT - SUBJECTIVE AND OBJECTIVE BOX
Patient is a 89y old  Male who presents with a chief complaint of Mechanical fall (08 Apr 2024 15:17)    INTERVAL HPI/OVERNIGHT EVENTS: Patient was seen and examined. sitting in chair. reports     I&O's Summary    07 Apr 2024 07:01  -  08 Apr 2024 07:00  --------------------------------------------------------  IN: 0 mL / OUT: 3000 mL / NET: -3000 mL    08 Apr 2024 07:01  -  08 Apr 2024 16:14  --------------------------------------------------------  IN: 0 mL / OUT: 1200 mL / NET: -1200 mL        LABS:                        8.9    7.48  )-----------( 164      ( 08 Apr 2024 05:30 )             25.7     04-08    134<L>  |  97  |  20  ----------------------------<  111<H>  3.9   |  29  |  0.93    Ca    8.5      08 Apr 2024 05:30  Mg     1.6     04-08    TPro  5.6<L>  /  Alb  2.3<L>  /  TBili  0.4  /  DBili  x   /  AST  22  /  ALT  19  /  AlkPhos  102  04-08      Urinalysis Basic - ( 08 Apr 2024 05:30 )    Color: x / Appearance: x / SG: x / pH: x  Gluc: 111 mg/dL / Ketone: x  / Bili: x / Urobili: x   Blood: x / Protein: x / Nitrite: x   Leuk Esterase: x / RBC: x / WBC x   Sq Epi: x / Non Sq Epi: x / Bacteria: x      CAPILLARY BLOOD GLUCOSE            Urinalysis Basic - ( 08 Apr 2024 05:30 )    Color: x / Appearance: x / SG: x / pH: x  Gluc: 111 mg/dL / Ketone: x  / Bili: x / Urobili: x   Blood: x / Protein: x / Nitrite: x   Leuk Esterase: x / RBC: x / WBC x   Sq Epi: x / Non Sq Epi: x / Bacteria: x        MEDICATIONS  (STANDING):  albuterol    0.083% 2.5 milliGRAM(s) Nebulizer every 12 hours  allopurinol 300 milliGRAM(s) Oral daily  amLODIPine   Tablet 2.5 milliGRAM(s) Oral <User Schedule>  ascorbic acid 500 milliGRAM(s) Oral two times a day  aspirin enteric coated 81 milliGRAM(s) Oral daily  atenolol  Tablet 25 milliGRAM(s) Oral daily  enoxaparin Injectable 40 milliGRAM(s) SubCutaneous every 24 hours  finasteride 5 milliGRAM(s) Oral daily  furosemide    Tablet 40 milliGRAM(s) Oral daily  levothyroxine 112 MICROGram(s) Oral daily  lidocaine   4% Patch 1 Patch Transdermal daily  mirtazapine 15 milliGRAM(s) Oral at bedtime  multivitamin/minerals 1 Tablet(s) Oral daily  pantoprazole    Tablet 40 milliGRAM(s) Oral before breakfast  pentoxifylline 400 milliGRAM(s) Oral three times a day  senna 1 Tablet(s) Oral at bedtime  simvastatin 5 milliGRAM(s) Oral at bedtime    MEDICATIONS  (PRN):  acetaminophen     Tablet .. 650 milliGRAM(s) Oral every 6 hours PRN Mild Pain (1 - 3)  bisacodyl 5 milliGRAM(s) Oral every 12 hours PRN Constipation  melatonin 3 milliGRAM(s) Oral at bedtime PRN Insomnia      REVIEW OF SYSTEMS:  CONSTITUTIONAL: No fever or chills  HEENT:  No headache, no sore throat  RESPIRATORY: No cough, wheezing, or shortness of breath  CARDIOVASCULAR: No chest pain, palpitations  GASTROINTESTINAL: No abdominal pain, nausea, vomiting, or diarrhea  GENITOURINARY: No dysuria, frequency, or hematuria  NEUROLOGICAL: no focal weakness or dizziness  MUSCULOSKELETAL: no myalgias  PSYCH: no recent changes in mood    RADIOLOGY & ADDITIONAL TESTS:    Imaging Personally Reviewed:  [x] YES  [ ] NO    Consultant(s) Notes Reviewed:  [x] YES  [ ] NO    PHYSICAL EXAM:  T(C): 36.9 (04-08-24 @ 12:12), Max: 37.1 (04-08-24 @ 05:01)  HR: 67 (04-08-24 @ 12:12) (67 - 87)  BP: 106/62 (04-08-24 @ 12:12) (106/62 - 143/63)  RR: 18 (04-08-24 @ 12:12) (18 - 18)  SpO2: 94% (04-08-24 @ 12:12) (92% - 96%)    GENERAL: NAD, well-developed, well-groomed  HEENT:  anicteric, moist mucous membranes  CHEST/LUNG:  CTA b/l, no rales, wheezes, or rhonchi  HEART:  RRR, S1, S2  ABDOMEN:  BS+, soft, nontender, nondistended  EXTREMITIES: no edema, cyanosis, or calf tenderness  NERVOUS SYSTEM: answers questions and follows commands appropriately  PSYCH: normal affect    Care Discussed with Consultants/Other Providers [x] YES  [ ] NO Patient is a 89y old  Male who presents with a chief complaint of Mechanical fall (08 Apr 2024 15:17)    INTERVAL HPI/OVERNIGHT EVENTS: Patient was seen and examined. sitting in chair. reports     I&O's Summary    07 Apr 2024 07:01  -  08 Apr 2024 07:00  --------------------------------------------------------  IN: 0 mL / OUT: 3000 mL / NET: -3000 mL    08 Apr 2024 07:01  -  08 Apr 2024 16:14  --------------------------------------------------------  IN: 0 mL / OUT: 1200 mL / NET: -1200 mL        LABS:                        8.9    7.48  )-----------( 164      ( 08 Apr 2024 05:30 )             25.7     04-08    134<L>  |  97  |  20  ----------------------------<  111<H>  3.9   |  29  |  0.93    Ca    8.5      08 Apr 2024 05:30  Mg     1.6     04-08    TPro  5.6<L>  /  Alb  2.3<L>  /  TBili  0.4  /  DBili  x   /  AST  22  /  ALT  19  /  AlkPhos  102  04-08      Urinalysis Basic - ( 08 Apr 2024 05:30 )    Color: x / Appearance: x / SG: x / pH: x  Gluc: 111 mg/dL / Ketone: x  / Bili: x / Urobili: x   Blood: x / Protein: x / Nitrite: x   Leuk Esterase: x / RBC: x / WBC x   Sq Epi: x / Non Sq Epi: x / Bacteria: x      CAPILLARY BLOOD GLUCOSE            Urinalysis Basic - ( 08 Apr 2024 05:30 )    Color: x / Appearance: x / SG: x / pH: x  Gluc: 111 mg/dL / Ketone: x  / Bili: x / Urobili: x   Blood: x / Protein: x / Nitrite: x   Leuk Esterase: x / RBC: x / WBC x   Sq Epi: x / Non Sq Epi: x / Bacteria: x        MEDICATIONS  (STANDING):  albuterol    0.083% 2.5 milliGRAM(s) Nebulizer every 12 hours  allopurinol 300 milliGRAM(s) Oral daily  amLODIPine   Tablet 2.5 milliGRAM(s) Oral <User Schedule>  ascorbic acid 500 milliGRAM(s) Oral two times a day  aspirin enteric coated 81 milliGRAM(s) Oral daily  atenolol  Tablet 25 milliGRAM(s) Oral daily  enoxaparin Injectable 40 milliGRAM(s) SubCutaneous every 24 hours  finasteride 5 milliGRAM(s) Oral daily  furosemide    Tablet 40 milliGRAM(s) Oral daily  levothyroxine 112 MICROGram(s) Oral daily  lidocaine   4% Patch 1 Patch Transdermal daily  mirtazapine 15 milliGRAM(s) Oral at bedtime  multivitamin/minerals 1 Tablet(s) Oral daily  pantoprazole    Tablet 40 milliGRAM(s) Oral before breakfast  pentoxifylline 400 milliGRAM(s) Oral three times a day  senna 1 Tablet(s) Oral at bedtime  simvastatin 5 milliGRAM(s) Oral at bedtime    MEDICATIONS  (PRN):  acetaminophen     Tablet .. 650 milliGRAM(s) Oral every 6 hours PRN Mild Pain (1 - 3)  bisacodyl 5 milliGRAM(s) Oral every 12 hours PRN Constipation  melatonin 3 milliGRAM(s) Oral at bedtime PRN Insomnia      REVIEW OF SYSTEMS:  CONSTITUTIONAL: No fever or chills  HEENT:  No headache, no sore throat  RESPIRATORY: No cough, wheezing, or shortness of breath  CARDIOVASCULAR: No chest pain, palpitations  GASTROINTESTINAL: No abdominal pain, nausea, vomiting, or diarrhea  GENITOURINARY: No dysuria, frequency, or hematuria  NEUROLOGICAL: no focal weakness or dizziness  MUSCULOSKELETAL: no myalgias  PSYCH: no recent changes in mood    RADIOLOGY & ADDITIONAL TESTS:    Imaging Personally Reviewed:  [x] YES  [ ] NO    Consultant(s) Notes Reviewed:  [x] YES  [ ] NO    PHYSICAL EXAM:  T(C): 36.9 (04-08-24 @ 12:12), Max: 37.1 (04-08-24 @ 05:01)  HR: 67 (04-08-24 @ 12:12) (67 - 87)  BP: 106/62 (04-08-24 @ 12:12) (106/62 - 143/63)  RR: 18 (04-08-24 @ 12:12) (18 - 18)  SpO2: 94% (04-08-24 @ 12:12) (92% - 96%)    GENERAL: NAD, well-developed, well-groomed  HEENT:  anicteric, moist mucous membranes  CHEST/LUNG:  CTA b/l, no rales, wheezes, or rhonchi  HEART:  RRR, S1, S2  ABDOMEN:  BS+, soft, nontender, nondistended  EXTREMITIES: +1 edema  NERVOUS SYSTEM: answers questions and follows commands appropriately  PSYCH: normal affect    Care Discussed with Consultants/Other Providers [x] YES  [ ] NO Patient is a 89y old  Male who presents with a chief complaint of Mechanical fall (08 Apr 2024 15:17)    INTERVAL HPI/OVERNIGHT EVENTS: Patient was seen and examined. sitting in chair. reports feeling well. tolerating diet.   had a BM after enema today. not dark or bloody per discussion with RN.     I&O's Summary    07 Apr 2024 07:01  -  08 Apr 2024 07:00  --------------------------------------------------------  IN: 0 mL / OUT: 3000 mL / NET: -3000 mL    08 Apr 2024 07:01  -  08 Apr 2024 16:14  --------------------------------------------------------  IN: 0 mL / OUT: 1200 mL / NET: -1200 mL        LABS:                        8.9    7.48  )-----------( 164      ( 08 Apr 2024 05:30 )             25.7     04-08    134<L>  |  97  |  20  ----------------------------<  111<H>  3.9   |  29  |  0.93    Ca    8.5      08 Apr 2024 05:30  Mg     1.6     04-08    TPro  5.6<L>  /  Alb  2.3<L>  /  TBili  0.4  /  DBili  x   /  AST  22  /  ALT  19  /  AlkPhos  102  04-08      Urinalysis Basic - ( 08 Apr 2024 05:30 )    Color: x / Appearance: x / SG: x / pH: x  Gluc: 111 mg/dL / Ketone: x  / Bili: x / Urobili: x   Blood: x / Protein: x / Nitrite: x   Leuk Esterase: x / RBC: x / WBC x   Sq Epi: x / Non Sq Epi: x / Bacteria: x      CAPILLARY BLOOD GLUCOSE            Urinalysis Basic - ( 08 Apr 2024 05:30 )    Color: x / Appearance: x / SG: x / pH: x  Gluc: 111 mg/dL / Ketone: x  / Bili: x / Urobili: x   Blood: x / Protein: x / Nitrite: x   Leuk Esterase: x / RBC: x / WBC x   Sq Epi: x / Non Sq Epi: x / Bacteria: x        MEDICATIONS  (STANDING):  albuterol    0.083% 2.5 milliGRAM(s) Nebulizer every 12 hours  allopurinol 300 milliGRAM(s) Oral daily  amLODIPine   Tablet 2.5 milliGRAM(s) Oral <User Schedule>  ascorbic acid 500 milliGRAM(s) Oral two times a day  aspirin enteric coated 81 milliGRAM(s) Oral daily  atenolol  Tablet 25 milliGRAM(s) Oral daily  enoxaparin Injectable 40 milliGRAM(s) SubCutaneous every 24 hours  finasteride 5 milliGRAM(s) Oral daily  furosemide    Tablet 40 milliGRAM(s) Oral daily  levothyroxine 112 MICROGram(s) Oral daily  lidocaine   4% Patch 1 Patch Transdermal daily  mirtazapine 15 milliGRAM(s) Oral at bedtime  multivitamin/minerals 1 Tablet(s) Oral daily  pantoprazole    Tablet 40 milliGRAM(s) Oral before breakfast  pentoxifylline 400 milliGRAM(s) Oral three times a day  senna 1 Tablet(s) Oral at bedtime  simvastatin 5 milliGRAM(s) Oral at bedtime    MEDICATIONS  (PRN):  acetaminophen     Tablet .. 650 milliGRAM(s) Oral every 6 hours PRN Mild Pain (1 - 3)  bisacodyl 5 milliGRAM(s) Oral every 12 hours PRN Constipation  melatonin 3 milliGRAM(s) Oral at bedtime PRN Insomnia      REVIEW OF SYSTEMS:  CONSTITUTIONAL: No fever or chills  HEENT:  No headache, no sore throat  RESPIRATORY: No cough, wheezing, or shortness of breath  CARDIOVASCULAR: No chest pain, palpitations  GASTROINTESTINAL: No abdominal pain, nausea, vomiting, or diarrhea  GENITOURINARY: No dysuria, frequency, or hematuria  NEUROLOGICAL: no focal weakness or dizziness  MUSCULOSKELETAL: no myalgias  PSYCH: no recent changes in mood    RADIOLOGY & ADDITIONAL TESTS:    Imaging Personally Reviewed:  [x] YES  [ ] NO    Consultant(s) Notes Reviewed:  [x] YES  [ ] NO    PHYSICAL EXAM:  T(C): 36.9 (04-08-24 @ 12:12), Max: 37.1 (04-08-24 @ 05:01)  HR: 67 (04-08-24 @ 12:12) (67 - 87)  BP: 106/62 (04-08-24 @ 12:12) (106/62 - 143/63)  RR: 18 (04-08-24 @ 12:12) (18 - 18)  SpO2: 94% (04-08-24 @ 12:12) (92% - 96%)    GENERAL: NAD, well-developed, well-groomed  HEENT:  anicteric, moist mucous membranes  CHEST/LUNG:  CTA b/l, no rales, wheezes, or rhonchi  HEART:  RRR, S1, S2  ABDOMEN:  BS+, soft, nontender, nondistended, + stewart with clear yellow urine   EXTREMITIES: +1 edema  NERVOUS SYSTEM: answers questions and follows commands appropriately  PSYCH: normal affect    Care Discussed with Consultants/Other Providers [x] YES  [ ] NO

## 2024-04-08 NOTE — PROGRESS NOTE ADULT - ASSESSMENT
89-year-old male history of hypertension hypothyroid with chronic indwelling Stewart catheter brought in by EMS status post multiple fall     Frequent fall/gait abnormality     - cont fall precautions    - cont PT. plan for [facility] for discharge. awaiting placement     rib fractures 11/12  bronchiolitis  atelectasis     - pulmonary following.     - on albuterol BID     - incentive spirometer     - seen by thoracic surgery, no acute intervention.     - lidoderm patch prn pain     leukocytosis/SIRS  chronic stewart     - wbc improved    - ID following. suspect urine is more colonization.     - BC NG x 48 h    BPH     - cont finasteride     hypothyroidism     - cont synthroid     DVT proph: lovenox 40mg daily   add dulcolax suppository and senna at night for constipation.  89-year-old male history of hypertension hypothyroid with chronic indwelling Stewart catheter brought in by EMS status post multiple fall     Frequent fall/gait abnormality     - cont fall precautions    - cont PT. plan for [facility] for discharge. awaiting placement     rib fractures 11/12  bronchiolitis  atelectasis     - pulmonary following.     - on albuterol BID     - incentive spirometer     - seen by thoracic surgery, no acute intervention.     - lidoderm patch prn pain     leukocytosis/SIRS  chronic stewart     - wbc improved    - ID following. urine culture is colonization.     - BC NG x 48 h    BPH     - cont finasteride     hypothyroidism     - cont synthroid     anemia     - check iron panel     - CT A/P reviewed. no lesions. stool brownish. Urine clear. no bruising noted on exam. will monitor. transfuse > 7     DVT proph: SCDs    discharge to [facility].   updated son at bedside.

## 2024-04-08 NOTE — CHART NOTE - NSCHARTNOTEFT_GEN_A_CORE
Called by RN, pt requesting for bedtime xanax. Pt states has been taking this at home routinely and has been receiving this for the past few days. Ordered xanax 0.25mg PO x1 now, day team to consider PRN order. RN to call for changes.

## 2024-04-09 ENCOUNTER — TRANSCRIPTION ENCOUNTER (OUTPATIENT)
Age: 89
End: 2024-04-09

## 2024-04-09 LAB
ALBUMIN SERPL ELPH-MCNC: 2.4 G/DL — LOW (ref 3.3–5)
ALP SERPL-CCNC: 109 U/L — SIGNIFICANT CHANGE UP (ref 40–120)
ALT FLD-CCNC: 31 U/L — SIGNIFICANT CHANGE UP (ref 12–78)
ANION GAP SERPL CALC-SCNC: 8 MMOL/L — SIGNIFICANT CHANGE UP (ref 5–17)
AST SERPL-CCNC: 35 U/L — SIGNIFICANT CHANGE UP (ref 15–37)
BILIRUB SERPL-MCNC: 0.5 MG/DL — SIGNIFICANT CHANGE UP (ref 0.2–1.2)
BUN SERPL-MCNC: 18 MG/DL — SIGNIFICANT CHANGE UP (ref 7–23)
CALCIUM SERPL-MCNC: 7.9 MG/DL — LOW (ref 8.5–10.1)
CHLORIDE SERPL-SCNC: 99 MMOL/L — SIGNIFICANT CHANGE UP (ref 96–108)
CO2 SERPL-SCNC: 28 MMOL/L — SIGNIFICANT CHANGE UP (ref 22–31)
CREAT SERPL-MCNC: 0.84 MG/DL — SIGNIFICANT CHANGE UP (ref 0.5–1.3)
EGFR: 79 ML/MIN/1.73M2 — SIGNIFICANT CHANGE UP
GLUCOSE SERPL-MCNC: 104 MG/DL — HIGH (ref 70–99)
HCT VFR BLD CALC: 27.3 % — LOW (ref 39–50)
HGB BLD-MCNC: 9.2 G/DL — LOW (ref 13–17)
MAGNESIUM SERPL-MCNC: 1.9 MG/DL — SIGNIFICANT CHANGE UP (ref 1.6–2.6)
MCHC RBC-ENTMCNC: 30.2 PG — SIGNIFICANT CHANGE UP (ref 27–34)
MCHC RBC-ENTMCNC: 33.7 GM/DL — SIGNIFICANT CHANGE UP (ref 32–36)
MCV RBC AUTO: 89.5 FL — SIGNIFICANT CHANGE UP (ref 80–100)
NRBC # BLD: 0 /100 WBCS — SIGNIFICANT CHANGE UP (ref 0–0)
PHOSPHATE SERPL-MCNC: 3.2 MG/DL — SIGNIFICANT CHANGE UP (ref 2.5–4.5)
PLATELET # BLD AUTO: 176 K/UL — SIGNIFICANT CHANGE UP (ref 150–400)
POTASSIUM SERPL-MCNC: 3.8 MMOL/L — SIGNIFICANT CHANGE UP (ref 3.5–5.3)
POTASSIUM SERPL-SCNC: 3.8 MMOL/L — SIGNIFICANT CHANGE UP (ref 3.5–5.3)
PROT SERPL-MCNC: 5.7 G/DL — LOW (ref 6–8.3)
RBC # BLD: 3.05 M/UL — LOW (ref 4.2–5.8)
RBC # FLD: 16.1 % — HIGH (ref 10.3–14.5)
SODIUM SERPL-SCNC: 135 MMOL/L — SIGNIFICANT CHANGE UP (ref 135–145)
WBC # BLD: 7.65 K/UL — SIGNIFICANT CHANGE UP (ref 3.8–10.5)
WBC # FLD AUTO: 7.65 K/UL — SIGNIFICANT CHANGE UP (ref 3.8–10.5)

## 2024-04-09 PROCEDURE — 99232 SBSQ HOSP IP/OBS MODERATE 35: CPT

## 2024-04-09 RX ORDER — ALPRAZOLAM 0.25 MG
0.25 TABLET ORAL AT BEDTIME
Refills: 0 | Status: DISCONTINUED | OUTPATIENT
Start: 2024-04-09 | End: 2024-04-10

## 2024-04-09 RX ADMIN — LIDOCAINE 1 PATCH: 4 CREAM TOPICAL at 00:10

## 2024-04-09 RX ADMIN — Medication 500 MILLIGRAM(S): at 05:13

## 2024-04-09 RX ADMIN — SIMVASTATIN 5 MILLIGRAM(S): 20 TABLET, FILM COATED ORAL at 21:20

## 2024-04-09 RX ADMIN — Medication 500 MILLIGRAM(S): at 17:48

## 2024-04-09 RX ADMIN — Medication 112 MICROGRAM(S): at 05:14

## 2024-04-09 RX ADMIN — Medication 81 MILLIGRAM(S): at 12:15

## 2024-04-09 RX ADMIN — Medication 3 MILLIGRAM(S): at 21:20

## 2024-04-09 RX ADMIN — Medication 1 TABLET(S): at 12:14

## 2024-04-09 RX ADMIN — Medication 400 MILLIGRAM(S): at 21:21

## 2024-04-09 RX ADMIN — Medication 40 MILLIGRAM(S): at 05:14

## 2024-04-09 RX ADMIN — MIRTAZAPINE 15 MILLIGRAM(S): 45 TABLET, ORALLY DISINTEGRATING ORAL at 21:20

## 2024-04-09 RX ADMIN — SENNA PLUS 1 TABLET(S): 8.6 TABLET ORAL at 21:20

## 2024-04-09 RX ADMIN — ALBUTEROL 2.5 MILLIGRAM(S): 90 AEROSOL, METERED ORAL at 08:11

## 2024-04-09 RX ADMIN — AMLODIPINE BESYLATE 2.5 MILLIGRAM(S): 2.5 TABLET ORAL at 05:19

## 2024-04-09 RX ADMIN — Medication 400 MILLIGRAM(S): at 05:13

## 2024-04-09 RX ADMIN — ATENOLOL 25 MILLIGRAM(S): 25 TABLET ORAL at 05:14

## 2024-04-09 RX ADMIN — Medication 300 MILLIGRAM(S): at 12:14

## 2024-04-09 RX ADMIN — LIDOCAINE 1 PATCH: 4 CREAM TOPICAL at 12:15

## 2024-04-09 RX ADMIN — Medication 0.25 MILLIGRAM(S): at 21:20

## 2024-04-09 RX ADMIN — Medication 400 MILLIGRAM(S): at 14:07

## 2024-04-09 RX ADMIN — LIDOCAINE 1 PATCH: 4 CREAM TOPICAL at 19:27

## 2024-04-09 RX ADMIN — FINASTERIDE 5 MILLIGRAM(S): 5 TABLET, FILM COATED ORAL at 12:15

## 2024-04-09 RX ADMIN — POLYETHYLENE GLYCOL 3350 17 GRAM(S): 17 POWDER, FOR SOLUTION ORAL at 12:15

## 2024-04-09 RX ADMIN — PANTOPRAZOLE SODIUM 40 MILLIGRAM(S): 20 TABLET, DELAYED RELEASE ORAL at 05:14

## 2024-04-09 RX ADMIN — ALBUTEROL 2.5 MILLIGRAM(S): 90 AEROSOL, METERED ORAL at 19:35

## 2024-04-09 RX ADMIN — AMLODIPINE BESYLATE 2.5 MILLIGRAM(S): 2.5 TABLET ORAL at 17:49

## 2024-04-09 NOTE — DISCHARGE NOTE PROVIDER - NSDCCAREPROVSEEN_GEN_ALL_CORE_FT
Gary, Abhijeet Velasquez, Andrew Liang, Tia Jackson, Ton Murray, Amanda Yu, Leonard Alvarez, Sukhdeep Sousa, Raji BALLESTEROS Eva, Tia Mcclain

## 2024-04-09 NOTE — SOCIAL WORK PROGRESS NOTE - NSSWPROGRESSNOTE_GEN_ALL_CORE
Family and pt has decided against [facility] for pt on dc requesting another facility. Pt accepted to [facility],   pending ref number [id number], clinicals faxed for review.  has informed nikole brizuela () at [phone] that pt is not accepting [facility] on dc and that auth should be discarded as family has decided against that facility.

## 2024-04-09 NOTE — DISCHARGE NOTE PROVIDER - CARE PROVIDER_API CALL
LANCE MEDRANO KIT  [address] [place] [address]  Phone: [phone]  Fax: ()-  Follow Up Time: 1 week

## 2024-04-09 NOTE — PROGRESS NOTE ADULT - SUBJECTIVE AND OBJECTIVE BOX
Patient is a 89y old  Male who presents with a chief complaint of Mechanical fall (09 Apr 2024 05:54)    INTERVAL HPI/OVERNIGHT EVENTS: Patient was seen and examined. No acute events occurred overnight. No new complaints.    I&O's Summary    08 Apr 2024 07:01  -  09 Apr 2024 07:00  --------------------------------------------------------  IN: 0 mL / OUT: 3100 mL / NET: -3100 mL        LABS:                        9.2    7.65  )-----------( 176      ( 09 Apr 2024 06:18 )             27.3     04-09    135  |  99  |  18  ----------------------------<  104<H>  3.8   |  28  |  0.84    Ca    7.9<L>      09 Apr 2024 06:18  Phos  3.2     04-09  Mg     1.9     04-09    TPro  5.7<L>  /  Alb  2.4<L>  /  TBili  0.5  /  DBili  x   /  AST  35  /  ALT  31  /  AlkPhos  109  04-09      Urinalysis Basic - ( 09 Apr 2024 06:18 )    Color: x / Appearance: x / SG: x / pH: x  Gluc: 104 mg/dL / Ketone: x  / Bili: x / Urobili: x   Blood: x / Protein: x / Nitrite: x   Leuk Esterase: x / RBC: x / WBC x   Sq Epi: x / Non Sq Epi: x / Bacteria: x      CAPILLARY BLOOD GLUCOSE            Urinalysis Basic - ( 09 Apr 2024 06:18 )    Color: x / Appearance: x / SG: x / pH: x  Gluc: 104 mg/dL / Ketone: x  / Bili: x / Urobili: x   Blood: x / Protein: x / Nitrite: x   Leuk Esterase: x / RBC: x / WBC x   Sq Epi: x / Non Sq Epi: x / Bacteria: x        MEDICATIONS  (STANDING):  albuterol    0.083% 2.5 milliGRAM(s) Nebulizer every 12 hours  allopurinol 300 milliGRAM(s) Oral daily  amLODIPine   Tablet 2.5 milliGRAM(s) Oral <User Schedule>  ascorbic acid 500 milliGRAM(s) Oral two times a day  aspirin enteric coated 81 milliGRAM(s) Oral daily  atenolol  Tablet 25 milliGRAM(s) Oral daily  finasteride 5 milliGRAM(s) Oral daily  furosemide    Tablet 40 milliGRAM(s) Oral daily  levothyroxine 112 MICROGram(s) Oral daily  lidocaine   4% Patch 1 Patch Transdermal daily  mirtazapine 15 milliGRAM(s) Oral at bedtime  multivitamin/minerals 1 Tablet(s) Oral daily  pantoprazole    Tablet 40 milliGRAM(s) Oral before breakfast  pentoxifylline 400 milliGRAM(s) Oral three times a day  polyethylene glycol 3350 17 Gram(s) Oral daily  senna 1 Tablet(s) Oral at bedtime  simvastatin 5 milliGRAM(s) Oral at bedtime    MEDICATIONS  (PRN):  acetaminophen     Tablet .. 650 milliGRAM(s) Oral every 6 hours PRN Mild Pain (1 - 3)  bisacodyl 5 milliGRAM(s) Oral every 12 hours PRN Constipation  melatonin 3 milliGRAM(s) Oral at bedtime PRN Insomnia      REVIEW OF SYSTEMS:  CONSTITUTIONAL: No fever or chills  HEENT:  No headache, no sore throat  RESPIRATORY: No cough, wheezing, or shortness of breath  CARDIOVASCULAR: No chest pain, palpitations  GASTROINTESTINAL: No abdominal pain, nausea, vomiting, or diarrhea  GENITOURINARY: No dysuria, frequency, or hematuria  NEUROLOGICAL: no focal weakness or dizziness  MUSCULOSKELETAL: no myalgias  PSYCH: no recent changes in mood    RADIOLOGY & ADDITIONAL TESTS:    Imaging Personally Reviewed:  [x] YES  [ ] NO    Consultant(s) Notes Reviewed:  [x] YES  [ ] NO    PHYSICAL EXAM:  T(C): 36.5 (04-09-24 @ 12:31), Max: 36.8 (04-09-24 @ 05:03)  HR: 68 (04-09-24 @ 12:31) (68 - 83)  BP: 121/64 (04-09-24 @ 12:31) (110/65 - 121/64)  RR: 18 (04-09-24 @ 12:31) (17 - 18)  SpO2: 95% (04-09-24 @ 12:31) (92% - 98%)    GENERAL: NAD, well-developed, well-groomed  HEENT:  anicteric, moist mucous membranes  CHEST/LUNG:  CTA b/l, no rales, wheezes, or rhonchi  HEART:  RRR, S1, S2  ABDOMEN:  BS+, soft, nontender, nondistended, + stewart with clear yellow urine   EXTREMITIES: +1 edema  NERVOUS SYSTEM: answers questions and follows commands appropriately  PSYCH: normal affect      Care Discussed with Consultants/Other Providers [x] YES  [ ] NO

## 2024-04-09 NOTE — DISCHARGE NOTE PROVIDER - NSDCMRMEDTOKEN_GEN_ALL_CORE_FT
allopurinol 300 mg oral tablet: 1 tab(s) orally once a day  ALPRAZolam 0.25 mg oral tablet: 2 tab(s) orally 2 times a day as needed for  insomnia  amLODIPine 2.5 mg oral tablet: 1 tab(s) orally 2 times a day  aspirin 81 mg oral tablet: orally once a day  atenolol 25 mg oral tablet: 1 tab(s) orally once a day  enoxaparin: 40 milligram(s) subcutaneous once a day while in rehabilitation facility  finasteride 5 mg oral tablet: 1 tab(s) orally once a day  furosemide 20 mg oral tablet: 2 tab(s) orally once a day  levothyroxine 112 mcg (0.112 mg) oral tablet: 1 tab(s) orally once a day  mirtazapine 15 mg oral tablet: 1 tab(s) orally once a day (at bedtime)  Multiple Vitamins oral tablet: 1 tab(s) orally once a day  pantoprazole 40 mg oral delayed release tablet: 1 tab(s) orally once a day  pentoxifylline 400 mg oral tablet, extended release: 1 tab(s) orally 3 times a day with meals  simvastatin 5 mg oral tablet: 1 tab(s) orally once a day (at bedtime)   acetaminophen 325 mg oral tablet: 2 tab(s) orally every 6 hours As needed Mild Pain (1 - 3)  allopurinol 300 mg oral tablet: 1 tab(s) orally once a day  ALPRAZolam 0.25 mg oral tablet: 2 tab(s) orally 2 times a day as needed for  insomnia or anxiety  amLODIPine 2.5 mg oral tablet: 1 tab(s) orally 2 times a day  aspirin 81 mg oral tablet: orally once a day  atenolol 25 mg oral tablet: 1 tab(s) orally once a day  enoxaparin: 40 milligram(s) subcutaneous once a day while in rehabilitation facility  finasteride 5 mg oral tablet: 1 tab(s) orally once a day  furosemide 20 mg oral tablet: 2 tab(s) orally once a day  levothyroxine 112 mcg (0.112 mg) oral tablet: 1 tab(s) orally once a day  lidocaine 4% topical film: Apply topically to affected area once a day as needed for  rib pain left side of ribs where patient complains of pain  mirtazapine 15 mg oral tablet: 1 tab(s) orally once a day (at bedtime)  Multiple Vitamins oral tablet: 1 tab(s) orally once a day  pantoprazole 40 mg oral delayed release tablet: 1 tab(s) orally once a day  pentoxifylline 400 mg oral tablet, extended release: 1 tab(s) orally 3 times a day with meals  polyethylene glycol 3350 oral powder for reconstitution: 17 gram(s) orally once a day  senna leaf extract oral tablet: 1 tab(s) orally once a day (at bedtime)  simvastatin 5 mg oral tablet: 1 tab(s) orally once a day (at bedtime)

## 2024-04-09 NOTE — DISCHARGE NOTE PROVIDER - HOSPITAL COURSE
89 y/o M history of hypertension hypothyroid with chronic indwelling Stewart catheter brought in by EMS status post multiple fall at home. Patient was found to have acutely displaced left ribs 11 and 12. He was seen by pulmonary and thoracic surgery. He was managed medically.     His stewart was changed in the ED. UCx growing proteus however patient was asymptomatic and remained afebrile with normal wbc. He was seen by ID. UCx more colonization than acute infection. Patient was monitored off antibiotics.     He also had anemia. Iron studies consistent with acute phase. No signs of active bleeding. Patient did not have any ecchymosis. His stools were unchanged. Urine clear without gross hematuria. Patient reports he was anemic outpt and was following with his PMD.     He was seen by PT and recommended SHAQ. Hospitalization was prolonged secondary to insurance authorization.     consults:   thoracic: Dr Sousa  Pulm: Dr Yu   ID: Dr Jackson HPI as documented in H&P:  89yo M PMHx HTN, hypothyroidism, HLD, chronic back pain 2/2 spinal stenosis, chronic indwelling Stewart brought in by EMS for mechanical fall. Pt states that he while he was getting up from his chair in an attempt to switch his Stewart bag, he slipped and landed on his buttocks. He was wearing a pendant that alerted EMS that he fell. Denies head strike, LOC, syncope. States that he did not want to be brought to the hospital. Taking baby aspirin as prescribed by his Cardiologist. Recently admitted Feb 2024 for falls, d/c to [facility]. Denies CP, HA, dizziness, vision changes, N/V/D/C.    IN THE ED:  Temp 97.2  F ,  , /82  ,RR 20 , SpO2 97  S/P 500cc NS bolus  Labs significant for WBC 14.16, H/H 10.5/30, Na 130, Alk phos 151, UA moderate leuk esterase, positive nitrite  Imaging:   CT Head:  1.  No CT evidence of acute intracranial pathology.  2.  Bony erosive changes involving the posterior superior aspect the left   mastoid air cells and left squamous temporal calvarium, measuring   approximately 2.8 x 1.4 x 1.4 cm, appears grossly stable dating back to   MRI of 07/05/2012.    CT Cervical Spine:  1.  No CT evidence of cervical spine fracture, traumatic malalignment or   suspicious osseous lesion.  2.  Mild cervical spine degenerative changes.  3.  Partially imaged fluid around the left glenohumeral joint, consistent   with joint effusion and/or bursitis.    CT Abd/P  Acute displaced left posterior 11th and 12th rib fractures. No pneumothorax.  Scattered tree-in bud opacities at the lung bases in the lower lobes, lingula, and right middle lobe likely reflective of a bronchiolitis.  Mild smooth interlobular septal thickening suggestive of mild interstitial edema. Small bilateral pleural effusions.  No acute intra-abdominal or pelvic findings on unenhanced CT of the abdomen and pelvis. Lack of intravenous contrast limits evaluation of the solid abdominal organs and vasculature.      Hospital Course:  89 y/o M history of hypertension hypothyroid with chronic indwelling Stewart catheter brought in by EMS status post multiple fall at home. Patient was found to have acutely displaced left ribs 11 and 12. He was seen by pulmonary and thoracic surgery. He was managed medically. No need for surgical intervention. Rib pain was well controlled.    UCx growing proteus however patient was asymptomatic and remained afebrile with normal wbc. He was seen by ID. UCx more colonization than acute infection. Patient was monitored off antibiotics and felt well. Chronic stewart was changed to a new catheter.     He also had anemia, which was stable in the hospital after hydration with Hgb in the 9s. There was no evidence of acute bleeding. Iron studies consistent with anemia of chronic disease and possibly component of acute reactant. Patient did not have signs of hematomas. His stools were unchanged. Urine clear without gross hematuria. Patient reports he was anemic outpt and was following with his PMD.     He was seen by PT and recommended SHAQ. Hospitalization was prolonged secondary to insurance authorization.     Consults:   thoracic surgery: Dr. Sousa  Pulm: Dr. Yu   ID: Dr. Jackson       On day of discharge:  ROS: pt denies fever, chills, SOB, CP, bleeding, dizziness.   Vital Signs Last 24 Hrs  T(C): 36.9 (10 Apr 2024 04:37), Max: 37 (09 Apr 2024 20:00)  T(F): 98.4 (10 Apr 2024 04:37), Max: 98.6 (09 Apr 2024 20:00)  HR: 84 (10 Apr 2024 04:37) (68 - 88)  BP: 120/66 (10 Apr 2024 04:37) (120/66 - 123/75)  BP(mean): --  RR: 18 (10 Apr 2024 04:37) (18 - 18)  SpO2: 95% (10 Apr 2024 04:37) (95% - 97%)    Parameters below as of 10 Apr 2024 04:37  Patient On (Oxygen Delivery Method): room air    PHYSICAL EXAM:  General: No apparent distress  HEENT: moist mucous membranes, no pharyngeal exudates, +[place]  Heart: rrr, S1, S2   Chest: CTA b/l, no rales, rhonchi, or wheezes  Abd: BS+, soft, NT, ND  Back: no CVA tenderness, stewart draining yellow urine  Extr: no edema or cyanosis  Neuro: answering questions and following commands appropriately  Psych: normal affect    Time spent: 50min

## 2024-04-09 NOTE — DISCHARGE NOTE PROVIDER - NSDCCPCAREPLAN_GEN_ALL_CORE_FT
PRINCIPAL DISCHARGE DIAGNOSIS  Diagnosis: Frequent falls  Assessment and Plan of Treatment: You came in with complaint of fall. You were seen by the physical therapist who recommends subacute rehabilitation to improve mobility. Please continue with conservative management.      SECONDARY DISCHARGE DIAGNOSES  Diagnosis: Acute UTI  Assessment and Plan of Treatment: Youwere admitted with a urinary tract infection. You were seen by the infectious disease doctor who recommended you start on intravenous antibiotics. Your symptoms have resolved. Please follow with your primary medical doctor.    Diagnosis: Closed fracture of two ribs of left side  Assessment and Plan of Treatment: You were found with acute left posterior 11th and 12th rib fractures. Continue conservative management. Pain management.    Diagnosis: Weakness  Assessment and Plan of Treatment: Your symptoms have improved. Continue supportive management.    Diagnosis: HTN (hypertension)  Assessment and Plan of Treatment: Continue current medical management.    Diagnosis: Hyperlipidemia  Assessment and Plan of Treatment: Continue current medical management.    Diagnosis: BPH (benign prostatic hyperplasia)  Assessment and Plan of Treatment: Continue current medical management.     PRINCIPAL DISCHARGE DIAGNOSIS  Diagnosis: Frequent falls  Assessment and Plan of Treatment: You came in with complaint of frequent falls. You were seen by the physical therapist who recommended subacute rehabilitation to improve mobility, strength and balance. Please continue working with therapists and doing calisthetic exercises on your own when sitting/lying down at rehab.      SECONDARY DISCHARGE DIAGNOSES  Diagnosis: Closed fracture of two ribs of left side  Assessment and Plan of Treatment: You were found with acute left posterior 11th and 12th rib fractures. Continue conservative management. Pain management with tylenol and lidocaine patch as needed.    Diagnosis: Anemia  Assessment and Plan of Treatment: You were found to have anemia with hemoglobin stable in the low-9s in the hospital. Your iron studies seemed like anemia of chronic disease though your recent falls may have contributed. Please have further anemia work-up with your PCP like B12, folate, or other work-up if deemed necessary. Monitor your hemoglobin.   Check CBC at least once in rehab to ensure stability within a week. You may use your aspirin and lovenox for DVT ppx for now if your hemoglobin remains stable.

## 2024-04-09 NOTE — PROGRESS NOTE ADULT - ASSESSMENT
89-year-old male history of hypertension hypothyroid with chronic indwelling Stewart catheter brought in by EMS status post multiple fall     Frequent fall/gait abnormality     - cont fall precautions    - cont PT. plan for [facility] for discharge. awaiting placement     rib fractures 11/12  bronchiolitis  atelectasis     - pulmonary following.     - on albuterol BID     - incentive spirometer     - seen by thoracic surgery, no acute intervention.     - lidoderm patch prn pain     leukocytosis/SIRS  chronic stewart     - wbc improved    - ID following. urine culture is colonization.     - BC NG x 4d    BPH     - cont finasteride     hypothyroidism     - cont synthroid     anemia     - acute phase anemia. stable.     - CT A/P reviewed. no lesions. stool brownish. Urine clear. no bruising noted on exam. will monitor. transfuse > 7     DVT proph: SCDs, if hgb stable, consider restarting heparin in AM    discharge to [facility]. awaiting placement.   updated son over the phone

## 2024-04-09 NOTE — PROGRESS NOTE ADULT - SUBJECTIVE AND OBJECTIVE BOX
Date/Time Patient Seen:  		  Referring MD:   Data Reviewed	       Patient is a 89y old  Male who presents with a chief complaint of Mechanical fall (08 Apr 2024 16:14)      Subjective/HPI     PAST MEDICAL & SURGICAL HISTORY:  HTN (hypertension)    High cholesterol    Spinal stenosis    Hypothyroidism    Insomnia    No significant past surgical history    S/P cholecystectomy    H/O inguinal hernia repair    S/P cardiac cath          Medication list         MEDICATIONS  (STANDING):  albuterol    0.083% 2.5 milliGRAM(s) Nebulizer every 12 hours  allopurinol 300 milliGRAM(s) Oral daily  amLODIPine   Tablet 2.5 milliGRAM(s) Oral <User Schedule>  ascorbic acid 500 milliGRAM(s) Oral two times a day  aspirin enteric coated 81 milliGRAM(s) Oral daily  atenolol  Tablet 25 milliGRAM(s) Oral daily  finasteride 5 milliGRAM(s) Oral daily  furosemide    Tablet 40 milliGRAM(s) Oral daily  levothyroxine 112 MICROGram(s) Oral daily  lidocaine   4% Patch 1 Patch Transdermal daily  mirtazapine 15 milliGRAM(s) Oral at bedtime  multivitamin/minerals 1 Tablet(s) Oral daily  pantoprazole    Tablet 40 milliGRAM(s) Oral before breakfast  pentoxifylline 400 milliGRAM(s) Oral three times a day  polyethylene glycol 3350 17 Gram(s) Oral daily  senna 1 Tablet(s) Oral at bedtime  simvastatin 5 milliGRAM(s) Oral at bedtime    MEDICATIONS  (PRN):  acetaminophen     Tablet .. 650 milliGRAM(s) Oral every 6 hours PRN Mild Pain (1 - 3)  bisacodyl 5 milliGRAM(s) Oral every 12 hours PRN Constipation  melatonin 3 milliGRAM(s) Oral at bedtime PRN Insomnia         Vitals log        ICU Vital Signs Last 24 Hrs  T(C): 36.8 (09 Apr 2024 05:03), Max: 36.9 (08 Apr 2024 12:12)  T(F): 98.2 (09 Apr 2024 05:03), Max: 98.5 (08 Apr 2024 12:12)  HR: 75 (09 Apr 2024 05:03) (67 - 83)  BP: 110/65 (09 Apr 2024 05:03) (106/62 - 112/67)  BP(mean): --  ABP: --  ABP(mean): --  RR: 18 (09 Apr 2024 05:03) (17 - 18)  SpO2: 93% (09 Apr 2024 05:03) (92% - 95%)    O2 Parameters below as of 09 Apr 2024 05:03  Patient On (Oxygen Delivery Method): room air                 Input and Output:  I&O's Detail    07 Apr 2024 07:01  -  08 Apr 2024 07:00  --------------------------------------------------------  IN:  Total IN: 0 mL    OUT:    Indwelling Catheter - Urethral (mL): 3000 mL  Total OUT: 3000 mL    Total NET: -3000 mL      08 Apr 2024 07:01  -  09 Apr 2024 05:54  --------------------------------------------------------  IN:  Total IN: 0 mL    OUT:    Indwelling Catheter - Urethral (mL): 1900 mL  Total OUT: 1900 mL    Total NET: -1900 mL          Lab Data                        8.9    7.48  )-----------( 164      ( 08 Apr 2024 05:30 )             25.7     04-08    134<L>  |  97  |  20  ----------------------------<  111<H>  3.9   |  29  |  0.93    Ca    8.5      08 Apr 2024 05:30  Mg     1.6     04-08    TPro  5.6<L>  /  Alb  2.3<L>  /  TBili  0.4  /  DBili  x   /  AST  22  /  ALT  19  /  AlkPhos  102  04-08            Review of Systems	      Objective     Physical Examination    heart s1s2  lung dc BS  head nc      Pertinent Lab findings & Imaging      Thien:  NO   Adequate UO     I&O's Detail    07 Apr 2024 07:01  -  08 Apr 2024 07:00  --------------------------------------------------------  IN:  Total IN: 0 mL    OUT:    Indwelling Catheter - Urethral (mL): 3000 mL  Total OUT: 3000 mL    Total NET: -3000 mL      08 Apr 2024 07:01  -  09 Apr 2024 05:54  --------------------------------------------------------  IN:  Total IN: 0 mL    OUT:    Indwelling Catheter - Urethral (mL): 1900 mL  Total OUT: 1900 mL    Total NET: -1900 mL               Discussed with:     Cultures:	        Radiology

## 2024-04-10 ENCOUNTER — TRANSCRIPTION ENCOUNTER (OUTPATIENT)
Age: 89
End: 2024-04-10

## 2024-04-10 VITALS
HEART RATE: 72 BPM | OXYGEN SATURATION: 96 % | RESPIRATION RATE: 18 BRPM | TEMPERATURE: 98 F | DIASTOLIC BLOOD PRESSURE: 69 MMHG | SYSTOLIC BLOOD PRESSURE: 113 MMHG

## 2024-04-10 LAB
ANION GAP SERPL CALC-SCNC: 8 MMOL/L — SIGNIFICANT CHANGE UP (ref 5–17)
BUN SERPL-MCNC: 23 MG/DL — SIGNIFICANT CHANGE UP (ref 7–23)
CALCIUM SERPL-MCNC: 8.1 MG/DL — LOW (ref 8.5–10.1)
CHLORIDE SERPL-SCNC: 97 MMOL/L — SIGNIFICANT CHANGE UP (ref 96–108)
CO2 SERPL-SCNC: 30 MMOL/L — SIGNIFICANT CHANGE UP (ref 22–31)
CREAT SERPL-MCNC: 0.98 MG/DL — SIGNIFICANT CHANGE UP (ref 0.5–1.3)
CULTURE RESULTS: SIGNIFICANT CHANGE UP
CULTURE RESULTS: SIGNIFICANT CHANGE UP
EGFR: 70 ML/MIN/1.73M2 — SIGNIFICANT CHANGE UP
GLUCOSE SERPL-MCNC: 102 MG/DL — HIGH (ref 70–99)
HCT VFR BLD CALC: 27.9 % — LOW (ref 39–50)
HGB BLD-MCNC: 9.4 G/DL — LOW (ref 13–17)
MCHC RBC-ENTMCNC: 30.3 PG — SIGNIFICANT CHANGE UP (ref 27–34)
MCHC RBC-ENTMCNC: 33.7 GM/DL — SIGNIFICANT CHANGE UP (ref 32–36)
MCV RBC AUTO: 90 FL — SIGNIFICANT CHANGE UP (ref 80–100)
NRBC # BLD: 0 /100 WBCS — SIGNIFICANT CHANGE UP (ref 0–0)
PLATELET # BLD AUTO: 189 K/UL — SIGNIFICANT CHANGE UP (ref 150–400)
POTASSIUM SERPL-MCNC: 3.7 MMOL/L — SIGNIFICANT CHANGE UP (ref 3.5–5.3)
POTASSIUM SERPL-SCNC: 3.7 MMOL/L — SIGNIFICANT CHANGE UP (ref 3.5–5.3)
RBC # BLD: 3.1 M/UL — LOW (ref 4.2–5.8)
RBC # FLD: 16.3 % — HIGH (ref 10.3–14.5)
SODIUM SERPL-SCNC: 135 MMOL/L — SIGNIFICANT CHANGE UP (ref 135–145)
SPECIMEN SOURCE: SIGNIFICANT CHANGE UP
SPECIMEN SOURCE: SIGNIFICANT CHANGE UP
WBC # BLD: 7.74 K/UL — SIGNIFICANT CHANGE UP (ref 3.8–10.5)
WBC # FLD AUTO: 7.74 K/UL — SIGNIFICANT CHANGE UP (ref 3.8–10.5)

## 2024-04-10 PROCEDURE — 80048 BASIC METABOLIC PNL TOTAL CA: CPT

## 2024-04-10 PROCEDURE — 83550 IRON BINDING TEST: CPT

## 2024-04-10 PROCEDURE — 97116 GAIT TRAINING THERAPY: CPT

## 2024-04-10 PROCEDURE — 87186 SC STD MICRODIL/AGAR DIL: CPT

## 2024-04-10 PROCEDURE — 84100 ASSAY OF PHOSPHORUS: CPT

## 2024-04-10 PROCEDURE — 83735 ASSAY OF MAGNESIUM: CPT

## 2024-04-10 PROCEDURE — 97166 OT EVAL MOD COMPLEX 45 MIN: CPT

## 2024-04-10 PROCEDURE — 87040 BLOOD CULTURE FOR BACTERIA: CPT

## 2024-04-10 PROCEDURE — 97162 PT EVAL MOD COMPLEX 30 MIN: CPT

## 2024-04-10 PROCEDURE — 94760 N-INVAS EAR/PLS OXIMETRY 1: CPT

## 2024-04-10 PROCEDURE — 81001 URINALYSIS AUTO W/SCOPE: CPT

## 2024-04-10 PROCEDURE — 87077 CULTURE AEROBIC IDENTIFY: CPT

## 2024-04-10 PROCEDURE — 85730 THROMBOPLASTIN TIME PARTIAL: CPT

## 2024-04-10 PROCEDURE — 70450 CT HEAD/BRAIN W/O DYE: CPT | Mod: MC

## 2024-04-10 PROCEDURE — 36415 COLL VENOUS BLD VENIPUNCTURE: CPT

## 2024-04-10 PROCEDURE — 85027 COMPLETE CBC AUTOMATED: CPT

## 2024-04-10 PROCEDURE — 80053 COMPREHEN METABOLIC PANEL: CPT

## 2024-04-10 PROCEDURE — 94640 AIRWAY INHALATION TREATMENT: CPT

## 2024-04-10 PROCEDURE — 83540 ASSAY OF IRON: CPT

## 2024-04-10 PROCEDURE — 85610 PROTHROMBIN TIME: CPT

## 2024-04-10 PROCEDURE — 97530 THERAPEUTIC ACTIVITIES: CPT

## 2024-04-10 PROCEDURE — 84466 ASSAY OF TRANSFERRIN: CPT

## 2024-04-10 PROCEDURE — 99239 HOSP IP/OBS DSCHRG MGMT >30: CPT

## 2024-04-10 PROCEDURE — 74176 CT ABD & PELVIS W/O CONTRAST: CPT | Mod: MC

## 2024-04-10 PROCEDURE — 85025 COMPLETE CBC W/AUTO DIFF WBC: CPT

## 2024-04-10 PROCEDURE — 72125 CT NECK SPINE W/O DYE: CPT | Mod: MC

## 2024-04-10 PROCEDURE — 82550 ASSAY OF CK (CPK): CPT

## 2024-04-10 PROCEDURE — 87086 URINE CULTURE/COLONY COUNT: CPT

## 2024-04-10 PROCEDURE — 99285 EMERGENCY DEPT VISIT HI MDM: CPT

## 2024-04-10 PROCEDURE — 82728 ASSAY OF FERRITIN: CPT

## 2024-04-10 PROCEDURE — 71250 CT THORAX DX C-: CPT | Mod: MC

## 2024-04-10 PROCEDURE — 83605 ASSAY OF LACTIC ACID: CPT

## 2024-04-10 RX ORDER — LIDOCAINE 4 G/100G
1 CREAM TOPICAL
Qty: 0 | Refills: 0 | DISCHARGE
Start: 2024-04-10

## 2024-04-10 RX ORDER — POLYETHYLENE GLYCOL 3350 17 G/17G
17 POWDER, FOR SOLUTION ORAL
Qty: 0 | Refills: 0 | DISCHARGE
Start: 2024-04-10

## 2024-04-10 RX ORDER — SENNA PLUS 8.6 MG/1
1 TABLET ORAL
Qty: 0 | Refills: 0 | DISCHARGE
Start: 2024-04-10

## 2024-04-10 RX ORDER — ALPRAZOLAM 0.25 MG
2 TABLET ORAL
Qty: 0 | Refills: 0 | DISCHARGE

## 2024-04-10 RX ORDER — ACETAMINOPHEN 500 MG
2 TABLET ORAL
Qty: 0 | Refills: 0 | DISCHARGE
Start: 2024-04-10

## 2024-04-10 RX ADMIN — PANTOPRAZOLE SODIUM 40 MILLIGRAM(S): 20 TABLET, DELAYED RELEASE ORAL at 05:30

## 2024-04-10 RX ADMIN — Medication 500 MILLIGRAM(S): at 05:30

## 2024-04-10 RX ADMIN — LIDOCAINE 1 PATCH: 4 CREAM TOPICAL at 00:00

## 2024-04-10 RX ADMIN — Medication 40 MILLIGRAM(S): at 05:30

## 2024-04-10 RX ADMIN — FINASTERIDE 5 MILLIGRAM(S): 5 TABLET, FILM COATED ORAL at 13:08

## 2024-04-10 RX ADMIN — ALBUTEROL 2.5 MILLIGRAM(S): 90 AEROSOL, METERED ORAL at 07:35

## 2024-04-10 RX ADMIN — AMLODIPINE BESYLATE 2.5 MILLIGRAM(S): 2.5 TABLET ORAL at 05:32

## 2024-04-10 RX ADMIN — LIDOCAINE 1 PATCH: 4 CREAM TOPICAL at 13:09

## 2024-04-10 RX ADMIN — Medication 81 MILLIGRAM(S): at 13:09

## 2024-04-10 RX ADMIN — Medication 300 MILLIGRAM(S): at 13:08

## 2024-04-10 RX ADMIN — ATENOLOL 25 MILLIGRAM(S): 25 TABLET ORAL at 05:30

## 2024-04-10 RX ADMIN — Medication 400 MILLIGRAM(S): at 05:32

## 2024-04-10 RX ADMIN — Medication 112 MICROGRAM(S): at 05:30

## 2024-04-10 RX ADMIN — Medication 400 MILLIGRAM(S): at 13:09

## 2024-04-10 RX ADMIN — Medication 1 TABLET(S): at 13:09

## 2024-04-10 RX ADMIN — POLYETHYLENE GLYCOL 3350 17 GRAM(S): 17 POWDER, FOR SOLUTION ORAL at 13:09

## 2024-04-10 NOTE — PROGRESS NOTE ADULT - ASSESSMENT
89-year-old male history of hypertension hypothyroid with chronic indwelling Neumann catheter brought in by EMS status post multiple fall     fall risk  ataxic gait  bronchiolitis  atelectasis  effusions  rib fx - 11 and 12 -   HTN  Hypothyroidism  BPH    plan for SHAQ    ID eval noted - VS noted -   Albuterol BID nebs for mucociliary clearance  I aaron if able to cooperate  monitor VS and Sat  goal sat > 88 pct  pain rx regimen for Rib fx - topical - oral and systemic agents  PT eval   Bowel rx regimen  fall prec  assist with needs  GOC discussion  cvs rx regimen - BP control - on Diuretic -   pt has chronic Neumann Cath  old records reviewed

## 2024-04-10 NOTE — PROGRESS NOTE ADULT - PROVIDER SPECIALTY LIST ADULT
Hospitalist
Hospitalist
Infectious Disease
Infectious Disease
Pulmonology
Hospitalist
Pulmonology
Pulmonology
Hospitalist
Pulmonology
Pulmonology

## 2024-04-10 NOTE — PROCEDURE NOTE - NSURITECHNIQUE_GU_A_CORE
Proper hand hygiene was performed/Sterile gloves were worn for the duration of the procedure/The site was cleaned with soap/water and sterile solution (betadine)/The catheter was appropriately lubricated/The catheter was secured with a securement device (e.g. StatLock)/The collection bag is below the level of the patient and urinary bladder/All applicable medical record documentation is completed

## 2024-04-10 NOTE — DISCHARGE NOTE NURSING/CASE MANAGEMENT/SOCIAL WORK - NSDCPEFALRISK_GEN_ALL_CORE
For information on Fall & Injury Prevention, visit: [email or web address] OR  [email or web address] OR  https://www.cdc.gov/steadi/patient.html

## 2024-04-10 NOTE — DISCHARGE NOTE NURSING/CASE MANAGEMENT/SOCIAL WORK - PATIENT PORTAL LINK FT
You can access the FollowMyHealth Patient Portal offered by [facility] by registering at the following website: [email or web address]. By joining [employer]’s FollowMyHealth portal, you will also be able to view your health information using other applications (apps) compatible with our system.

## 2024-04-10 NOTE — PROGRESS NOTE ADULT - SUBJECTIVE AND OBJECTIVE BOX
Date/Time Patient Seen:  		  Referring MD:   Data Reviewed	       Patient is a 89y old  Male who presents with a chief complaint of Mechanical fall (09 Apr 2024 17:51)      Subjective/HPI     PAST MEDICAL & SURGICAL HISTORY:  HTN (hypertension)    High cholesterol    Spinal stenosis    Hypothyroidism    Insomnia    No significant past surgical history    S/P cholecystectomy    H/O inguinal hernia repair    S/P cardiac cath          Medication list         MEDICATIONS  (STANDING):  albuterol    0.083% 2.5 milliGRAM(s) Nebulizer every 12 hours  allopurinol 300 milliGRAM(s) Oral daily  amLODIPine   Tablet 2.5 milliGRAM(s) Oral <User Schedule>  ascorbic acid 500 milliGRAM(s) Oral two times a day  aspirin enteric coated 81 milliGRAM(s) Oral daily  atenolol  Tablet 25 milliGRAM(s) Oral daily  finasteride 5 milliGRAM(s) Oral daily  furosemide    Tablet 40 milliGRAM(s) Oral daily  levothyroxine 112 MICROGram(s) Oral daily  lidocaine   4% Patch 1 Patch Transdermal daily  mirtazapine 15 milliGRAM(s) Oral at bedtime  multivitamin/minerals 1 Tablet(s) Oral daily  pantoprazole    Tablet 40 milliGRAM(s) Oral before breakfast  pentoxifylline 400 milliGRAM(s) Oral three times a day  polyethylene glycol 3350 17 Gram(s) Oral daily  senna 1 Tablet(s) Oral at bedtime  simvastatin 5 milliGRAM(s) Oral at bedtime    MEDICATIONS  (PRN):  acetaminophen     Tablet .. 650 milliGRAM(s) Oral every 6 hours PRN Mild Pain (1 - 3)  ALPRAZolam 0.25 milliGRAM(s) Oral at bedtime PRN insomnia  bisacodyl 5 milliGRAM(s) Oral every 12 hours PRN Constipation  melatonin 3 milliGRAM(s) Oral at bedtime PRN Insomnia         Vitals log        ICU Vital Signs Last 24 Hrs  T(C): 36.9 (10 Apr 2024 04:37), Max: 37 (09 Apr 2024 20:00)  T(F): 98.4 (10 Apr 2024 04:37), Max: 98.6 (09 Apr 2024 20:00)  HR: 84 (10 Apr 2024 04:37) (68 - 88)  BP: 120/66 (10 Apr 2024 04:37) (120/66 - 123/75)  BP(mean): --  ABP: --  ABP(mean): --  RR: 18 (10 Apr 2024 04:37) (18 - 18)  SpO2: 95% (10 Apr 2024 04:37) (95% - 98%)    O2 Parameters below as of 10 Apr 2024 04:37  Patient On (Oxygen Delivery Method): room air                 Input and Output:  I&O's Detail    09 Apr 2024 07:01  -  10 Apr 2024 07:00  --------------------------------------------------------  IN:  Total IN: 0 mL    OUT:    Indwelling Catheter - Urethral (mL): 1800 mL    Voided (mL): 200 mL  Total OUT: 2000 mL    Total NET: -2000 mL          Lab Data                        9.2    7.65  )-----------( 176      ( 09 Apr 2024 06:18 )             27.3     04-09    135  |  99  |  18  ----------------------------<  104<H>  3.8   |  28  |  0.84    Ca    7.9<L>      09 Apr 2024 06:18  Phos  3.2     04-09  Mg     1.9     04-09    TPro  5.7<L>  /  Alb  2.4<L>  /  TBili  0.5  /  DBili  x   /  AST  35  /  ALT  31  /  AlkPhos  109  04-09            Review of Systems	      Objective     Physical Examination  heart s1s2  lung dc BS  head nc        Pertinent Lab findings & Imaging      Thien:  NO   Adequate UO     I&O's Detail    09 Apr 2024 07:01  -  10 Apr 2024 07:00  --------------------------------------------------------  IN:  Total IN: 0 mL    OUT:    Indwelling Catheter - Urethral (mL): 1800 mL    Voided (mL): 200 mL  Total OUT: 2000 mL    Total NET: -2000 mL               Discussed with:     Cultures:	        Radiology

## 2024-04-10 NOTE — SOCIAL WORK PROGRESS NOTE - NSSWPROGRESSNOTE_GEN_ALL_CORE
joan received from [facility] for [facility]. Auth # [id number],  level 1, from  4/9/- 4/21/24.  auth received from [facility] include [facility] beds at the [facility] for [facility]. Auth # [id number],  level 1, from  4/9/- 4/21/24. pt and sons maverick and hal in agreement. [facility] to  pt at 1:30pm. all paperwork to accompany pt. No further sw services indicated at this time.

## 2025-06-11 NOTE — ED ADULT NURSE REASSESSMENT NOTE - GENITOURINARY WDL
Occupational Therapy    Patient not seen in therapy.     Patient with other health care provider    On hold due to medical condition    Patient with physical therapy service at this time. Hypotensive. Will continue to follow and reattempt as able.       OBJECTIVE                           Documented in the chart in the following areas: Assessment/Plan.        Therapy procedure time and total treatment time can be found documented on the Time Entry flowsheet   Bladder non-tender and non-distended.
